# Patient Record
Sex: FEMALE | Race: BLACK OR AFRICAN AMERICAN | Employment: OTHER
[De-identification: names, ages, dates, MRNs, and addresses within clinical notes are randomized per-mention and may not be internally consistent; named-entity substitution may affect disease eponyms.]

---

## 2017-01-03 DIAGNOSIS — Z13.31 POSITIVE DEPRESSION SCREENING: ICD-10-CM

## 2017-01-04 RX ORDER — CITALOPRAM 20 MG/1
TABLET ORAL
Qty: 30 TABLET | Refills: 3 | Status: SHIPPED | OUTPATIENT
Start: 2017-01-04 | End: 2017-05-08 | Stop reason: SDUPTHER

## 2017-01-20 ENCOUNTER — CARE COORDINATION (OUTPATIENT)
Dept: FAMILY MEDICINE CLINIC | Facility: CLINIC | Age: 62
End: 2017-01-20

## 2017-01-26 ENCOUNTER — OFFICE VISIT (OUTPATIENT)
Dept: INTERNAL MEDICINE | Facility: CLINIC | Age: 62
End: 2017-01-26

## 2017-01-26 VITALS
OXYGEN SATURATION: 96 % | DIASTOLIC BLOOD PRESSURE: 92 MMHG | WEIGHT: 280 LBS | SYSTOLIC BLOOD PRESSURE: 156 MMHG | HEIGHT: 70 IN | BODY MASS INDEX: 40.09 KG/M2 | HEART RATE: 78 BPM

## 2017-01-26 DIAGNOSIS — E11.9 TYPE 2 DIABETES MELLITUS WITHOUT COMPLICATION, WITH LONG-TERM CURRENT USE OF INSULIN (HCC): Primary | ICD-10-CM

## 2017-01-26 DIAGNOSIS — E55.9 VITAMIN D DEFICIENCY DISEASE: ICD-10-CM

## 2017-01-26 DIAGNOSIS — E66.9 OBESITY (BMI 30-39.9): ICD-10-CM

## 2017-01-26 DIAGNOSIS — I10 ESSENTIAL HYPERTENSION: ICD-10-CM

## 2017-01-26 DIAGNOSIS — Z79.4 TYPE 2 DIABETES MELLITUS WITHOUT COMPLICATION, WITH LONG-TERM CURRENT USE OF INSULIN (HCC): Primary | ICD-10-CM

## 2017-01-26 DIAGNOSIS — F32.89 OTHER DEPRESSION: ICD-10-CM

## 2017-01-26 DIAGNOSIS — Z23 NEED FOR PNEUMOCOCCAL VACCINATION: ICD-10-CM

## 2017-01-26 PROCEDURE — 99214 OFFICE O/P EST MOD 30 MIN: CPT | Performed by: INTERNAL MEDICINE

## 2017-01-26 PROCEDURE — 90471 IMMUNIZATION ADMIN: CPT | Performed by: INTERNAL MEDICINE

## 2017-01-26 PROCEDURE — 90732 PPSV23 VACC 2 YRS+ SUBQ/IM: CPT | Performed by: INTERNAL MEDICINE

## 2017-01-26 RX ORDER — HYDROCHLOROTHIAZIDE 50 MG/1
TABLET ORAL
Qty: 30 TABLET | Refills: 5 | Status: SHIPPED | OUTPATIENT
Start: 2017-01-26 | End: 2017-04-27 | Stop reason: SDUPTHER

## 2017-01-26 RX ORDER — METOPROLOL TARTRATE 75 MG/1
75 TABLET, FILM COATED ORAL 2 TIMES DAILY
Qty: 60 TABLET | Refills: 5 | Status: SHIPPED | OUTPATIENT
Start: 2017-01-26 | End: 2017-04-27 | Stop reason: SDUPTHER

## 2017-01-26 RX ORDER — HYDRALAZINE HYDROCHLORIDE 50 MG/1
50 TABLET, FILM COATED ORAL 4 TIMES DAILY
Qty: 120 TABLET | Refills: 5 | Status: SHIPPED | OUTPATIENT
Start: 2017-01-26 | End: 2017-04-27 | Stop reason: SDUPTHER

## 2017-01-26 ASSESSMENT — PATIENT HEALTH QUESTIONNAIRE - PHQ9
SUM OF ALL RESPONSES TO PHQ QUESTIONS 1-9: 0
2. FEELING DOWN, DEPRESSED OR HOPELESS: 0
1. LITTLE INTEREST OR PLEASURE IN DOING THINGS: 0
SUM OF ALL RESPONSES TO PHQ9 QUESTIONS 1 & 2: 0

## 2017-01-31 ENCOUNTER — CARE COORDINATION (OUTPATIENT)
Dept: INTERNAL MEDICINE | Facility: CLINIC | Age: 62
End: 2017-01-31

## 2017-01-31 VITALS — HEART RATE: 82 BPM | DIASTOLIC BLOOD PRESSURE: 92 MMHG | SYSTOLIC BLOOD PRESSURE: 150 MMHG | RESPIRATION RATE: 18 BRPM

## 2017-02-02 DIAGNOSIS — E11.9 TYPE 2 DIABETES MELLITUS WITHOUT COMPLICATION, WITH LONG-TERM CURRENT USE OF INSULIN (HCC): ICD-10-CM

## 2017-02-02 DIAGNOSIS — Z79.4 TYPE 2 DIABETES MELLITUS WITHOUT COMPLICATION, WITH LONG-TERM CURRENT USE OF INSULIN (HCC): ICD-10-CM

## 2017-02-13 DIAGNOSIS — E78.5 HYPERLIPIDEMIA WITH TARGET LDL LESS THAN 100: ICD-10-CM

## 2017-02-13 DIAGNOSIS — E11.9 TYPE 2 DIABETES MELLITUS WITHOUT COMPLICATION (HCC): ICD-10-CM

## 2017-02-17 RX ORDER — ASPIRIN 81 MG
TABLET,CHEWABLE ORAL
Qty: 30 TABLET | Refills: 0 | OUTPATIENT
Start: 2017-02-17

## 2017-02-17 RX ORDER — ATORVASTATIN CALCIUM 40 MG/1
TABLET, FILM COATED ORAL
Qty: 30 TABLET | Refills: 0 | OUTPATIENT
Start: 2017-02-17

## 2017-02-28 DIAGNOSIS — E11.9 TYPE 2 DIABETES MELLITUS WITHOUT COMPLICATION (HCC): ICD-10-CM

## 2017-02-28 DIAGNOSIS — E78.5 HYPERLIPIDEMIA WITH TARGET LDL LESS THAN 100: ICD-10-CM

## 2017-03-01 DIAGNOSIS — I10 ESSENTIAL HYPERTENSION: ICD-10-CM

## 2017-03-01 DIAGNOSIS — E11.9 TYPE 2 DIABETES MELLITUS WITHOUT COMPLICATION, WITHOUT LONG-TERM CURRENT USE OF INSULIN (HCC): ICD-10-CM

## 2017-03-06 ENCOUNTER — CARE COORDINATOR VISIT (OUTPATIENT)
Dept: FAMILY MEDICINE CLINIC | Facility: CLINIC | Age: 62
End: 2017-03-06

## 2017-03-06 RX ORDER — ATORVASTATIN CALCIUM 40 MG/1
TABLET, FILM COATED ORAL
Qty: 30 TABLET | Refills: 3 | Status: SHIPPED | OUTPATIENT
Start: 2017-03-06 | End: 2017-06-19 | Stop reason: SDUPTHER

## 2017-03-06 RX ORDER — GLIPIZIDE 5 MG/1
TABLET ORAL
Qty: 60 TABLET | Refills: 3 | Status: SHIPPED | OUTPATIENT
Start: 2017-03-06 | End: 2017-06-21

## 2017-03-06 RX ORDER — LOSARTAN POTASSIUM 100 MG/1
TABLET ORAL
Qty: 28 TABLET | Refills: 3 | Status: SHIPPED | OUTPATIENT
Start: 2017-03-06 | End: 2017-04-27 | Stop reason: SDUPTHER

## 2017-03-06 RX ORDER — ASPIRIN 81 MG
TABLET,CHEWABLE ORAL
Qty: 30 TABLET | Refills: 3 | Status: SHIPPED | OUTPATIENT
Start: 2017-03-06 | End: 2017-06-19 | Stop reason: SDUPTHER

## 2017-03-07 VITALS
RESPIRATION RATE: 20 BRPM | HEART RATE: 76 BPM | DIASTOLIC BLOOD PRESSURE: 84 MMHG | OXYGEN SATURATION: 97 % | SYSTOLIC BLOOD PRESSURE: 148 MMHG

## 2017-03-19 ENCOUNTER — HOSPITAL ENCOUNTER (OUTPATIENT)
Age: 62
Setting detail: OBSERVATION
Discharge: HOME | End: 2017-03-20
Attending: EMERGENCY MEDICINE | Admitting: EMERGENCY MEDICINE
Payer: MEDICAID

## 2017-03-19 ENCOUNTER — APPOINTMENT (OUTPATIENT)
Dept: GENERAL RADIOLOGY | Age: 62
End: 2017-03-19
Payer: MEDICAID

## 2017-03-19 DIAGNOSIS — R07.9 CHEST PAIN, UNSPECIFIED TYPE: Primary | ICD-10-CM

## 2017-03-19 LAB
ANION GAP SERPL CALCULATED.3IONS-SCNC: 12 MMOL/L (ref 9–17)
BUN BLDV-MCNC: 13 MG/DL (ref 6–20)
BUN/CREAT BLD: ABNORMAL (ref 9–20)
CALCIUM SERPL-MCNC: 9.4 MG/DL (ref 8.6–10.4)
CHLORIDE BLD-SCNC: 102 MMOL/L (ref 98–107)
CO2: 25 MMOL/L (ref 20–31)
CREAT SERPL-MCNC: 0.6 MG/DL (ref 0.5–0.9)
GFR AFRICAN AMERICAN: >60 ML/MIN
GFR NON-AFRICAN AMERICAN: >60 ML/MIN
GFR SERPL CREATININE-BSD FRML MDRD: ABNORMAL ML/MIN/{1.73_M2}
GFR SERPL CREATININE-BSD FRML MDRD: ABNORMAL ML/MIN/{1.73_M2}
GLUCOSE BLD-MCNC: 137 MG/DL (ref 70–99)
HCT VFR BLD CALC: 38.2 % (ref 36–46)
HEMOGLOBIN: 12.9 G/DL (ref 12–16)
MCH RBC QN AUTO: 26.9 PG (ref 26–34)
MCHC RBC AUTO-ENTMCNC: 33.7 G/DL (ref 31–37)
MCV RBC AUTO: 79.9 FL (ref 80–100)
PDW BLD-RTO: 18.8 % (ref 12.5–15.4)
PLATELET # BLD: 261 K/UL (ref 140–450)
PMV BLD AUTO: 9.9 FL (ref 6–12)
POC TROPONIN I: 0 NG/ML (ref 0–0.1)
POC TROPONIN INTERP: NORMAL
POTASSIUM SERPL-SCNC: 4.2 MMOL/L (ref 3.7–5.3)
RBC # BLD: 4.78 M/UL (ref 4–5.2)
SODIUM BLD-SCNC: 139 MMOL/L (ref 135–144)
WBC # BLD: 7.9 K/UL (ref 3.5–11)

## 2017-03-19 PROCEDURE — 85027 COMPLETE CBC AUTOMATED: CPT

## 2017-03-19 PROCEDURE — G0378 HOSPITAL OBSERVATION PER HR: HCPCS

## 2017-03-19 PROCEDURE — 71020 XR CHEST STANDARD TWO VW: CPT

## 2017-03-19 PROCEDURE — 99285 EMERGENCY DEPT VISIT HI MDM: CPT

## 2017-03-19 PROCEDURE — 80048 BASIC METABOLIC PNL TOTAL CA: CPT

## 2017-03-19 PROCEDURE — 2500000003 HC RX 250 WO HCPCS: Performed by: EMERGENCY MEDICINE

## 2017-03-19 PROCEDURE — 93005 ELECTROCARDIOGRAM TRACING: CPT

## 2017-03-19 PROCEDURE — 84484 ASSAY OF TROPONIN QUANT: CPT

## 2017-03-19 PROCEDURE — 96374 THER/PROPH/DIAG INJ IV PUSH: CPT

## 2017-03-19 RX ORDER — CITALOPRAM 20 MG/1
20 TABLET ORAL DAILY
Status: DISCONTINUED | OUTPATIENT
Start: 2017-03-20 | End: 2017-03-20 | Stop reason: HOSPADM

## 2017-03-19 RX ORDER — ACETAMINOPHEN 325 MG/1
650 TABLET ORAL EVERY 4 HOURS PRN
Status: DISCONTINUED | OUTPATIENT
Start: 2017-03-19 | End: 2017-03-20 | Stop reason: HOSPADM

## 2017-03-19 RX ORDER — LOSARTAN POTASSIUM 50 MG/1
100 TABLET ORAL DAILY
Status: DISCONTINUED | OUTPATIENT
Start: 2017-03-20 | End: 2017-03-20 | Stop reason: HOSPADM

## 2017-03-19 RX ORDER — LABETALOL HYDROCHLORIDE 5 MG/ML
10 INJECTION, SOLUTION INTRAVENOUS ONCE
Status: COMPLETED | OUTPATIENT
Start: 2017-03-19 | End: 2017-03-19

## 2017-03-19 RX ORDER — ATORVASTATIN CALCIUM 40 MG/1
40 TABLET, FILM COATED ORAL NIGHTLY
Status: DISCONTINUED | OUTPATIENT
Start: 2017-03-20 | End: 2017-03-20 | Stop reason: HOSPADM

## 2017-03-19 RX ORDER — GLIPIZIDE 5 MG/1
5 TABLET ORAL
Status: DISCONTINUED | OUTPATIENT
Start: 2017-03-20 | End: 2017-03-20 | Stop reason: HOSPADM

## 2017-03-19 RX ORDER — M-VIT,TX,IRON,MINS/CALC/FOLIC 27MG-0.4MG
1 TABLET ORAL DAILY
Status: DISCONTINUED | OUTPATIENT
Start: 2017-03-20 | End: 2017-03-20 | Stop reason: HOSPADM

## 2017-03-19 RX ORDER — HYDROCHLOROTHIAZIDE 50 MG/1
50 TABLET ORAL DAILY
Status: DISCONTINUED | OUTPATIENT
Start: 2017-03-20 | End: 2017-03-20 | Stop reason: HOSPADM

## 2017-03-19 RX ORDER — METOPROLOL TARTRATE 50 MG/1
75 TABLET, FILM COATED ORAL 2 TIMES DAILY
Status: DISCONTINUED | OUTPATIENT
Start: 2017-03-20 | End: 2017-03-20 | Stop reason: HOSPADM

## 2017-03-19 RX ORDER — SODIUM CHLORIDE 0.9 % (FLUSH) 0.9 %
10 SYRINGE (ML) INJECTION EVERY 12 HOURS SCHEDULED
Status: DISCONTINUED | OUTPATIENT
Start: 2017-03-20 | End: 2017-03-20 | Stop reason: HOSPADM

## 2017-03-19 RX ORDER — HYDRALAZINE HYDROCHLORIDE 50 MG/1
50 TABLET, FILM COATED ORAL 4 TIMES DAILY
Status: DISCONTINUED | OUTPATIENT
Start: 2017-03-20 | End: 2017-03-20 | Stop reason: HOSPADM

## 2017-03-19 RX ORDER — ASPIRIN 81 MG/1
81 TABLET, CHEWABLE ORAL DAILY
Status: DISCONTINUED | OUTPATIENT
Start: 2017-03-20 | End: 2017-03-20 | Stop reason: HOSPADM

## 2017-03-19 RX ORDER — SODIUM CHLORIDE 0.9 % (FLUSH) 0.9 %
10 SYRINGE (ML) INJECTION PRN
Status: DISCONTINUED | OUTPATIENT
Start: 2017-03-19 | End: 2017-03-20 | Stop reason: HOSPADM

## 2017-03-19 RX ORDER — ALBUTEROL SULFATE 90 UG/1
2 AEROSOL, METERED RESPIRATORY (INHALATION) EVERY 6 HOURS PRN
Status: DISCONTINUED | OUTPATIENT
Start: 2017-03-19 | End: 2017-03-20 | Stop reason: HOSPADM

## 2017-03-19 RX ADMIN — LABETALOL HYDROCHLORIDE 10 MG: 5 INJECTION, SOLUTION INTRAVENOUS at 21:40

## 2017-03-19 ASSESSMENT — HEART SCORE
ECG: 0
ECG: 0

## 2017-03-19 ASSESSMENT — PAIN DESCRIPTION - ORIENTATION: ORIENTATION: MID

## 2017-03-19 ASSESSMENT — PAIN DESCRIPTION - PAIN TYPE: TYPE: ACUTE PAIN

## 2017-03-19 ASSESSMENT — PAIN DESCRIPTION - DESCRIPTORS: DESCRIPTORS: TIGHTNESS

## 2017-03-19 ASSESSMENT — PAIN DESCRIPTION - LOCATION: LOCATION: CHEST

## 2017-03-19 ASSESSMENT — PAIN SCALES - GENERAL: PAINLEVEL_OUTOF10: 4

## 2017-03-19 ASSESSMENT — PAIN DESCRIPTION - FREQUENCY: FREQUENCY: CONTINUOUS

## 2017-03-20 VITALS
RESPIRATION RATE: 18 BRPM | DIASTOLIC BLOOD PRESSURE: 89 MMHG | SYSTOLIC BLOOD PRESSURE: 129 MMHG | TEMPERATURE: 98.2 F | BODY MASS INDEX: 40.09 KG/M2 | HEIGHT: 70 IN | HEART RATE: 76 BPM | OXYGEN SATURATION: 95 % | WEIGHT: 280 LBS

## 2017-03-20 LAB
EKG ATRIAL RATE: 69 BPM
EKG ATRIAL RATE: 79 BPM
EKG ATRIAL RATE: 89 BPM
EKG P AXIS: 0 DEGREES
EKG P AXIS: 5 DEGREES
EKG P AXIS: 53 DEGREES
EKG P-R INTERVAL: 162 MS
EKG P-R INTERVAL: 164 MS
EKG P-R INTERVAL: 168 MS
EKG Q-T INTERVAL: 380 MS
EKG Q-T INTERVAL: 406 MS
EKG Q-T INTERVAL: 442 MS
EKG QRS DURATION: 84 MS
EKG QRS DURATION: 90 MS
EKG QRS DURATION: 94 MS
EKG QTC CALCULATION (BAZETT): 462 MS
EKG QTC CALCULATION (BAZETT): 465 MS
EKG QTC CALCULATION (BAZETT): 473 MS
EKG R AXIS: -16 DEGREES
EKG R AXIS: -18 DEGREES
EKG R AXIS: -18 DEGREES
EKG T AXIS: 11 DEGREES
EKG T AXIS: 39 DEGREES
EKG T AXIS: 9 DEGREES
EKG VENTRICULAR RATE: 69 BPM
EKG VENTRICULAR RATE: 79 BPM
EKG VENTRICULAR RATE: 89 BPM
GLUCOSE BLD-MCNC: 134 MG/DL (ref 65–105)
TROPONIN INTERP: NORMAL
TROPONIN INTERP: NORMAL
TROPONIN T: <0.03 NG/ML
TROPONIN T: <0.03 NG/ML

## 2017-03-20 PROCEDURE — G0378 HOSPITAL OBSERVATION PER HR: HCPCS

## 2017-03-20 PROCEDURE — 36415 COLL VENOUS BLD VENIPUNCTURE: CPT

## 2017-03-20 PROCEDURE — 93005 ELECTROCARDIOGRAM TRACING: CPT

## 2017-03-20 PROCEDURE — 6360000002 HC RX W HCPCS: Performed by: EMERGENCY MEDICINE

## 2017-03-20 PROCEDURE — 6370000000 HC RX 637 (ALT 250 FOR IP): Performed by: EMERGENCY MEDICINE

## 2017-03-20 PROCEDURE — 2580000003 HC RX 258: Performed by: EMERGENCY MEDICINE

## 2017-03-20 PROCEDURE — 96372 THER/PROPH/DIAG INJ SC/IM: CPT

## 2017-03-20 PROCEDURE — 84484 ASSAY OF TROPONIN QUANT: CPT

## 2017-03-20 PROCEDURE — 82947 ASSAY GLUCOSE BLOOD QUANT: CPT

## 2017-03-20 RX ADMIN — HYDRALAZINE HYDROCHLORIDE 50 MG: 50 TABLET, FILM COATED ORAL at 09:58

## 2017-03-20 RX ADMIN — ENOXAPARIN SODIUM 40 MG: 40 INJECTION SUBCUTANEOUS at 09:10

## 2017-03-20 RX ADMIN — METOPROLOL TARTRATE 75 MG: 50 TABLET, FILM COATED ORAL at 09:57

## 2017-03-20 RX ADMIN — HYDROCHLOROTHIAZIDE 50 MG: 50 TABLET ORAL at 09:57

## 2017-03-20 RX ADMIN — ASPIRIN 81 MG: 81 TABLET, CHEWABLE ORAL at 09:58

## 2017-03-20 RX ADMIN — LOSARTAN POTASSIUM 100 MG: 50 TABLET, FILM COATED ORAL at 12:58

## 2017-03-20 RX ADMIN — CITALOPRAM 20 MG: 20 TABLET, FILM COATED ORAL at 09:58

## 2017-03-20 RX ADMIN — MULTIPLE VITAMINS W/ MINERALS TAB 1 TABLET: TAB at 09:57

## 2017-03-20 RX ADMIN — Medication 10 ML: at 09:58

## 2017-03-20 RX ADMIN — GLIPIZIDE 5 MG: 5 TABLET ORAL at 12:58

## 2017-03-20 ASSESSMENT — ENCOUNTER SYMPTOMS
SHORTNESS OF BREATH: 0
WHEEZING: 0
ABDOMINAL PAIN: 0
RHINORRHEA: 0
COUGH: 0
VOMITING: 0
CONSTIPATION: 0
TROUBLE SWALLOWING: 0
SORE THROAT: 0
BLOOD IN STOOL: 0
DIARRHEA: 0
NAUSEA: 1

## 2017-03-20 ASSESSMENT — PAIN SCALES - GENERAL: PAINLEVEL_OUTOF10: 0

## 2017-03-28 ENCOUNTER — HOSPITAL ENCOUNTER (OUTPATIENT)
Dept: MAMMOGRAPHY | Age: 62
Discharge: HOME | End: 2017-03-28
Payer: MEDICAID

## 2017-03-28 DIAGNOSIS — Z12.31 ENCOUNTER FOR SCREENING MAMMOGRAM FOR MALIGNANT NEOPLASM OF BREAST: ICD-10-CM

## 2017-03-28 PROCEDURE — G0202 SCR MAMMO BI INCL CAD: HCPCS

## 2017-04-11 DIAGNOSIS — E55.9 VITAMIN D DEFICIENCY DISEASE: ICD-10-CM

## 2017-04-11 DIAGNOSIS — M17.0 OSTEOARTHRITIS OF BOTH KNEES, UNSPECIFIED OSTEOARTHRITIS TYPE: ICD-10-CM

## 2017-04-13 RX ORDER — ERGOCALCIFEROL 1.25 MG/1
CAPSULE ORAL
Qty: 2 CAPSULE | Refills: 0 | Status: SHIPPED | OUTPATIENT
Start: 2017-04-13 | End: 2017-05-08 | Stop reason: SDUPTHER

## 2017-04-27 ENCOUNTER — HOSPITAL ENCOUNTER (OUTPATIENT)
Age: 62
Setting detail: SPECIMEN
Discharge: HOME | End: 2017-04-27
Payer: MEDICAID

## 2017-04-27 ENCOUNTER — OFFICE VISIT (OUTPATIENT)
Dept: INTERNAL MEDICINE | Age: 62
End: 2017-04-27
Payer: MEDICAID

## 2017-04-27 VITALS
HEIGHT: 64 IN | BODY MASS INDEX: 46.1 KG/M2 | OXYGEN SATURATION: 96 % | SYSTOLIC BLOOD PRESSURE: 144 MMHG | HEART RATE: 78 BPM | DIASTOLIC BLOOD PRESSURE: 66 MMHG | RESPIRATION RATE: 12 BRPM | WEIGHT: 270 LBS

## 2017-04-27 DIAGNOSIS — Z79.4 TYPE 2 DIABETES MELLITUS WITHOUT COMPLICATION, WITH LONG-TERM CURRENT USE OF INSULIN (HCC): Primary | ICD-10-CM

## 2017-04-27 DIAGNOSIS — J45.909 UNCOMPLICATED ASTHMA, UNSPECIFIED ASTHMA SEVERITY: ICD-10-CM

## 2017-04-27 DIAGNOSIS — E11.9 TYPE 2 DIABETES MELLITUS WITHOUT COMPLICATION, WITH LONG-TERM CURRENT USE OF INSULIN (HCC): Primary | ICD-10-CM

## 2017-04-27 DIAGNOSIS — I10 ESSENTIAL HYPERTENSION: ICD-10-CM

## 2017-04-27 LAB — HBA1C MFR BLD: 6.8 %

## 2017-04-27 PROCEDURE — 83036 HEMOGLOBIN GLYCOSYLATED A1C: CPT | Performed by: INTERNAL MEDICINE

## 2017-04-27 PROCEDURE — 99214 OFFICE O/P EST MOD 30 MIN: CPT | Performed by: INTERNAL MEDICINE

## 2017-04-27 RX ORDER — METOPROLOL TARTRATE 75 MG/1
75 TABLET, FILM COATED ORAL 2 TIMES DAILY
Qty: 60 TABLET | Refills: 5 | Status: SHIPPED | OUTPATIENT
Start: 2017-04-27 | End: 2018-01-08

## 2017-04-27 RX ORDER — HYDROCHLOROTHIAZIDE 50 MG/1
TABLET ORAL
Qty: 30 TABLET | Refills: 5 | Status: SHIPPED | OUTPATIENT
Start: 2017-04-27 | End: 2018-02-05 | Stop reason: SDUPTHER

## 2017-04-27 RX ORDER — HYDRALAZINE HYDROCHLORIDE 25 MG/1
75 TABLET, FILM COATED ORAL 4 TIMES DAILY
Qty: 120 TABLET | Refills: 0 | Status: SHIPPED | OUTPATIENT
Start: 2017-04-27 | End: 2017-06-05 | Stop reason: SDUPTHER

## 2017-04-27 RX ORDER — LOSARTAN POTASSIUM 100 MG/1
TABLET ORAL
Qty: 28 TABLET | Refills: 3 | Status: SHIPPED | OUTPATIENT
Start: 2017-04-27 | End: 2017-11-06 | Stop reason: SDUPTHER

## 2017-04-27 RX ORDER — ALBUTEROL SULFATE 90 UG/1
2 AEROSOL, METERED RESPIRATORY (INHALATION) EVERY 6 HOURS PRN
Qty: 1 INHALER | Refills: 3 | Status: SHIPPED | OUTPATIENT
Start: 2017-04-27 | End: 2017-12-27 | Stop reason: SDUPTHER

## 2017-04-28 LAB
CREATININE URINE: 97 MG/DL (ref 28–217)
MICROALBUMIN/CREAT 24H UR: <12 MG/L
MICROALBUMIN/CREAT UR-RTO: 12 MCG/MG CREAT

## 2017-05-01 ENCOUNTER — HOSPITAL ENCOUNTER (OUTPATIENT)
Age: 62
Setting detail: SPECIMEN
Discharge: HOME | End: 2017-05-01
Payer: MEDICAID

## 2017-05-01 ENCOUNTER — OFFICE VISIT (OUTPATIENT)
Dept: INTERNAL MEDICINE | Age: 62
End: 2017-05-01
Payer: MEDICAID

## 2017-05-01 VITALS
OXYGEN SATURATION: 97 % | WEIGHT: 271 LBS | DIASTOLIC BLOOD PRESSURE: 76 MMHG | HEIGHT: 64 IN | BODY MASS INDEX: 46.26 KG/M2 | SYSTOLIC BLOOD PRESSURE: 132 MMHG | HEART RATE: 84 BPM

## 2017-05-01 DIAGNOSIS — K21.9 GASTROESOPHAGEAL REFLUX DISEASE WITHOUT ESOPHAGITIS: Primary | ICD-10-CM

## 2017-05-01 DIAGNOSIS — E78.5 HYPERLIPIDEMIA WITH TARGET LDL LESS THAN 100: ICD-10-CM

## 2017-05-01 DIAGNOSIS — Z79.4 TYPE 2 DIABETES MELLITUS WITHOUT COMPLICATION, WITH LONG-TERM CURRENT USE OF INSULIN (HCC): ICD-10-CM

## 2017-05-01 DIAGNOSIS — I10 ESSENTIAL HYPERTENSION: ICD-10-CM

## 2017-05-01 DIAGNOSIS — E11.9 TYPE 2 DIABETES MELLITUS WITHOUT COMPLICATION, WITH LONG-TERM CURRENT USE OF INSULIN (HCC): ICD-10-CM

## 2017-05-01 LAB
CHOLESTEROL/HDL RATIO: 2.2
CHOLESTEROL: 116 MG/DL
HDLC SERPL-MCNC: 53 MG/DL
LDL CHOLESTEROL: 52 MG/DL (ref 0–130)
TRIGL SERPL-MCNC: 54 MG/DL
VLDLC SERPL CALC-MCNC: NORMAL MG/DL (ref 1–30)

## 2017-05-01 PROCEDURE — 36415 COLL VENOUS BLD VENIPUNCTURE: CPT

## 2017-05-01 PROCEDURE — 80061 LIPID PANEL: CPT

## 2017-05-01 PROCEDURE — 99214 OFFICE O/P EST MOD 30 MIN: CPT | Performed by: INTERNAL MEDICINE

## 2017-05-01 RX ORDER — OMEPRAZOLE 20 MG/1
20 CAPSULE, DELAYED RELEASE ORAL 2 TIMES DAILY
Qty: 30 CAPSULE | Refills: 3 | Status: SHIPPED | OUTPATIENT
Start: 2017-05-01 | End: 2017-06-19 | Stop reason: SDUPTHER

## 2017-05-08 DIAGNOSIS — Z13.31 POSITIVE DEPRESSION SCREENING: ICD-10-CM

## 2017-05-08 DIAGNOSIS — M17.0 OSTEOARTHRITIS OF BOTH KNEES, UNSPECIFIED OSTEOARTHRITIS TYPE: ICD-10-CM

## 2017-05-08 DIAGNOSIS — E55.9 VITAMIN D DEFICIENCY DISEASE: ICD-10-CM

## 2017-05-08 RX ORDER — ERGOCALCIFEROL 1.25 MG/1
CAPSULE ORAL
Qty: 2 CAPSULE | Refills: 0 | Status: SHIPPED | OUTPATIENT
Start: 2017-05-08 | End: 2017-05-23 | Stop reason: SDUPTHER

## 2017-05-08 RX ORDER — CITALOPRAM 20 MG/1
TABLET ORAL
Qty: 30 TABLET | Refills: 0 | Status: SHIPPED | OUTPATIENT
Start: 2017-05-08 | End: 2017-05-23 | Stop reason: SDUPTHER

## 2017-05-23 DIAGNOSIS — Z13.31 POSITIVE DEPRESSION SCREENING: ICD-10-CM

## 2017-05-23 DIAGNOSIS — E55.9 VITAMIN D DEFICIENCY DISEASE: ICD-10-CM

## 2017-05-23 DIAGNOSIS — M17.0 OSTEOARTHRITIS OF BOTH KNEES, UNSPECIFIED OSTEOARTHRITIS TYPE: ICD-10-CM

## 2017-05-23 RX ORDER — CITALOPRAM 20 MG/1
TABLET ORAL
Qty: 30 TABLET | Refills: 3 | Status: SHIPPED | OUTPATIENT
Start: 2017-05-23 | End: 2017-11-06 | Stop reason: SDUPTHER

## 2017-05-23 RX ORDER — ERGOCALCIFEROL 1.25 MG/1
CAPSULE ORAL
Qty: 4 CAPSULE | Refills: 3 | Status: SHIPPED | OUTPATIENT
Start: 2017-05-23 | End: 2017-08-16 | Stop reason: SDUPTHER

## 2017-06-05 DIAGNOSIS — I10 ESSENTIAL HYPERTENSION: ICD-10-CM

## 2017-06-05 RX ORDER — HYDRALAZINE HYDROCHLORIDE 25 MG/1
TABLET, FILM COATED ORAL
Qty: 120 TABLET | Refills: 0 | Status: SHIPPED | OUTPATIENT
Start: 2017-06-05 | End: 2017-07-21 | Stop reason: SDUPTHER

## 2017-06-19 DIAGNOSIS — K21.9 GASTROESOPHAGEAL REFLUX DISEASE WITHOUT ESOPHAGITIS: ICD-10-CM

## 2017-06-19 DIAGNOSIS — E11.9 TYPE 2 DIABETES MELLITUS WITHOUT COMPLICATION (HCC): ICD-10-CM

## 2017-06-19 DIAGNOSIS — E78.5 HYPERLIPIDEMIA WITH TARGET LDL LESS THAN 100: ICD-10-CM

## 2017-06-21 RX ORDER — OMEPRAZOLE 20 MG/1
CAPSULE, DELAYED RELEASE ORAL
Qty: 60 CAPSULE | Refills: 0 | Status: SHIPPED | OUTPATIENT
Start: 2017-06-21 | End: 2017-07-21 | Stop reason: SDUPTHER

## 2017-06-21 RX ORDER — GLIPIZIDE 5 MG/1
TABLET ORAL
Qty: 60 TABLET | Refills: 0 | Status: SHIPPED | OUTPATIENT
Start: 2017-06-21 | End: 2017-07-24

## 2017-06-21 RX ORDER — ATORVASTATIN CALCIUM 40 MG/1
TABLET, FILM COATED ORAL
Qty: 30 TABLET | Refills: 0 | Status: SHIPPED | OUTPATIENT
Start: 2017-06-21 | End: 2017-07-21 | Stop reason: SDUPTHER

## 2017-06-21 RX ORDER — ASPIRIN 81 MG
TABLET,CHEWABLE ORAL
Qty: 14 TABLET | Refills: 0 | Status: SHIPPED | OUTPATIENT
Start: 2017-06-21 | End: 2017-07-21 | Stop reason: SDUPTHER

## 2017-07-17 ENCOUNTER — HOSPITAL ENCOUNTER (EMERGENCY)
Age: 62
Discharge: HOME | End: 2017-07-17
Attending: EMERGENCY MEDICINE
Payer: MEDICAID

## 2017-07-17 VITALS
RESPIRATION RATE: 16 BRPM | DIASTOLIC BLOOD PRESSURE: 89 MMHG | HEART RATE: 63 BPM | TEMPERATURE: 98.1 F | OXYGEN SATURATION: 100 % | SYSTOLIC BLOOD PRESSURE: 135 MMHG

## 2017-07-17 DIAGNOSIS — B37.2 CANDIDA INFECTION OF FLEXURAL SKIN: Primary | ICD-10-CM

## 2017-07-17 PROCEDURE — 6370000000 HC RX 637 (ALT 250 FOR IP): Performed by: PHYSICIAN ASSISTANT

## 2017-07-17 PROCEDURE — 99282 EMERGENCY DEPT VISIT SF MDM: CPT

## 2017-07-17 RX ORDER — CLOTRIMAZOLE AND BETAMETHASONE DIPROPIONATE 10; .64 MG/G; MG/G
CREAM TOPICAL
Qty: 1 TUBE | Refills: 0 | Status: SHIPPED | OUTPATIENT
Start: 2017-07-17 | End: 2017-10-23 | Stop reason: SDUPTHER

## 2017-07-17 RX ORDER — CLOTRIMAZOLE AND BETAMETHASONE DIPROPIONATE 10; .64 MG/G; MG/G
CREAM TOPICAL 2 TIMES DAILY
Status: DISCONTINUED | OUTPATIENT
Start: 2017-07-17 | End: 2017-07-17 | Stop reason: HOSPADM

## 2017-07-17 RX ADMIN — CLOTRIMAZOLE AND BETAMETHASONE DIPROPIONATE: 10; .5 CREAM TOPICAL at 15:04

## 2017-07-17 ASSESSMENT — ENCOUNTER SYMPTOMS
WHEEZING: 0
ABDOMINAL PAIN: 0
VOMITING: 0
NAUSEA: 0
COLOR CHANGE: 0
COUGH: 0

## 2017-07-17 ASSESSMENT — PAIN DESCRIPTION - FREQUENCY: FREQUENCY: CONTINUOUS

## 2017-07-17 ASSESSMENT — PAIN DESCRIPTION - PAIN TYPE: TYPE: ACUTE PAIN

## 2017-07-17 ASSESSMENT — PAIN DESCRIPTION - ORIENTATION: ORIENTATION: RIGHT;POSTERIOR

## 2017-07-17 ASSESSMENT — PAIN DESCRIPTION - LOCATION: LOCATION: KNEE

## 2017-07-17 ASSESSMENT — PAIN DESCRIPTION - DESCRIPTORS: DESCRIPTORS: ITCHING;BURNING

## 2017-07-17 ASSESSMENT — PAIN SCALES - GENERAL: PAINLEVEL_OUTOF10: 7

## 2017-07-21 DIAGNOSIS — E78.5 HYPERLIPIDEMIA WITH TARGET LDL LESS THAN 100: ICD-10-CM

## 2017-07-21 DIAGNOSIS — E11.9 TYPE 2 DIABETES MELLITUS WITHOUT COMPLICATION (HCC): ICD-10-CM

## 2017-07-21 DIAGNOSIS — I10 ESSENTIAL HYPERTENSION: ICD-10-CM

## 2017-07-21 DIAGNOSIS — K21.9 GASTROESOPHAGEAL REFLUX DISEASE WITHOUT ESOPHAGITIS: ICD-10-CM

## 2017-07-24 RX ORDER — ASPIRIN 81 MG
TABLET,CHEWABLE ORAL
Qty: 30 TABLET | Refills: 3 | Status: SHIPPED | OUTPATIENT
Start: 2017-07-24 | End: 2017-12-08 | Stop reason: SDUPTHER

## 2017-07-24 RX ORDER — HYDRALAZINE HYDROCHLORIDE 25 MG/1
TABLET, FILM COATED ORAL
Qty: 120 TABLET | Refills: 3 | Status: SHIPPED | OUTPATIENT
Start: 2017-07-24 | End: 2017-11-06

## 2017-07-24 RX ORDER — ATORVASTATIN CALCIUM 40 MG/1
TABLET, FILM COATED ORAL
Qty: 30 TABLET | Refills: 3 | Status: SHIPPED | OUTPATIENT
Start: 2017-07-24 | End: 2017-12-08 | Stop reason: SDUPTHER

## 2017-07-24 RX ORDER — OMEPRAZOLE 20 MG/1
CAPSULE, DELAYED RELEASE ORAL
Qty: 60 CAPSULE | Refills: 3 | Status: SHIPPED | OUTPATIENT
Start: 2017-07-24 | End: 2017-12-08 | Stop reason: SDUPTHER

## 2017-07-24 RX ORDER — GLIPIZIDE 5 MG/1
TABLET ORAL
Qty: 60 TABLET | Refills: 3 | Status: SHIPPED | OUTPATIENT
Start: 2017-07-24 | End: 2017-12-11

## 2017-08-08 ENCOUNTER — HOSPITAL ENCOUNTER (EMERGENCY)
Age: 62
Discharge: HOME | End: 2017-08-08
Attending: EMERGENCY MEDICINE
Payer: MEDICAID

## 2017-08-08 VITALS
BODY MASS INDEX: 46.1 KG/M2 | SYSTOLIC BLOOD PRESSURE: 132 MMHG | RESPIRATION RATE: 19 BRPM | HEART RATE: 98 BPM | TEMPERATURE: 97.9 F | HEIGHT: 64 IN | WEIGHT: 270 LBS | OXYGEN SATURATION: 94 % | DIASTOLIC BLOOD PRESSURE: 85 MMHG

## 2017-08-08 DIAGNOSIS — H81.13 BENIGN PAROXYSMAL POSITIONAL VERTIGO, BILATERAL: Primary | ICD-10-CM

## 2017-08-08 PROCEDURE — 99283 EMERGENCY DEPT VISIT LOW MDM: CPT

## 2017-08-08 PROCEDURE — 6360000002 HC RX W HCPCS: Performed by: STUDENT IN AN ORGANIZED HEALTH CARE EDUCATION/TRAINING PROGRAM

## 2017-08-08 PROCEDURE — 96372 THER/PROPH/DIAG INJ SC/IM: CPT

## 2017-08-08 PROCEDURE — 6370000000 HC RX 637 (ALT 250 FOR IP): Performed by: STUDENT IN AN ORGANIZED HEALTH CARE EDUCATION/TRAINING PROGRAM

## 2017-08-08 RX ORDER — NYSTATIN 100000 [USP'U]/G
POWDER TOPICAL
Qty: 1 BOTTLE | Refills: 0 | Status: SHIPPED | OUTPATIENT
Start: 2017-08-08 | End: 2017-10-23 | Stop reason: SDUPTHER

## 2017-08-08 RX ORDER — MECLIZINE HCL 12.5 MG/1
25 TABLET ORAL ONCE
Status: COMPLETED | OUTPATIENT
Start: 2017-08-08 | End: 2017-08-08

## 2017-08-08 RX ORDER — MECLIZINE HCL 12.5 MG/1
12.5 TABLET ORAL 3 TIMES DAILY PRN
Qty: 20 TABLET | Refills: 0 | Status: SHIPPED | OUTPATIENT
Start: 2017-08-08 | End: 2018-04-23

## 2017-08-08 RX ORDER — KETOROLAC TROMETHAMINE 30 MG/ML
30 INJECTION, SOLUTION INTRAMUSCULAR; INTRAVENOUS ONCE
Status: COMPLETED | OUTPATIENT
Start: 2017-08-08 | End: 2017-08-08

## 2017-08-08 RX ADMIN — KETOROLAC TROMETHAMINE 30 MG: 30 INJECTION, SOLUTION INTRAMUSCULAR at 18:28

## 2017-08-08 RX ADMIN — MECLIZINE HCL 25 MG: 12.5 TABLET ORAL at 18:28

## 2017-08-08 ASSESSMENT — PAIN DESCRIPTION - PAIN TYPE: TYPE: ACUTE PAIN

## 2017-08-08 ASSESSMENT — ENCOUNTER SYMPTOMS
WHEEZING: 0
ABDOMINAL PAIN: 0
BACK PAIN: 0
VOMITING: 0
SHORTNESS OF BREATH: 0
NAUSEA: 0
CHEST TIGHTNESS: 0
TROUBLE SWALLOWING: 0
SORE THROAT: 0
COUGH: 0

## 2017-08-08 ASSESSMENT — PAIN SCALES - GENERAL
PAINLEVEL_OUTOF10: 6
PAINLEVEL_OUTOF10: 6

## 2017-08-08 ASSESSMENT — PAIN DESCRIPTION - DESCRIPTORS: DESCRIPTORS: HEADACHE;PRESSURE

## 2017-08-16 ENCOUNTER — OFFICE VISIT (OUTPATIENT)
Dept: INTERNAL MEDICINE | Age: 62
End: 2017-08-16
Payer: MEDICAID

## 2017-08-16 VITALS
SYSTOLIC BLOOD PRESSURE: 127 MMHG | WEIGHT: 279 LBS | HEIGHT: 64 IN | OXYGEN SATURATION: 99 % | HEART RATE: 72 BPM | DIASTOLIC BLOOD PRESSURE: 77 MMHG | BODY MASS INDEX: 47.63 KG/M2

## 2017-08-16 DIAGNOSIS — E55.9 VITAMIN D DEFICIENCY DISEASE: ICD-10-CM

## 2017-08-16 DIAGNOSIS — I10 ESSENTIAL HYPERTENSION: Primary | ICD-10-CM

## 2017-08-16 DIAGNOSIS — Z79.4 TYPE 2 DIABETES MELLITUS WITHOUT COMPLICATION, WITH LONG-TERM CURRENT USE OF INSULIN (HCC): ICD-10-CM

## 2017-08-16 DIAGNOSIS — E11.9 TYPE 2 DIABETES MELLITUS WITHOUT COMPLICATION, WITH LONG-TERM CURRENT USE OF INSULIN (HCC): ICD-10-CM

## 2017-08-16 LAB — HBA1C MFR BLD: 7 %

## 2017-08-16 PROCEDURE — 99214 OFFICE O/P EST MOD 30 MIN: CPT | Performed by: FAMILY MEDICINE

## 2017-08-16 PROCEDURE — 83036 HEMOGLOBIN GLYCOSYLATED A1C: CPT | Performed by: FAMILY MEDICINE

## 2017-08-16 RX ORDER — ERGOCALCIFEROL 1.25 MG/1
50000 CAPSULE ORAL WEEKLY
Qty: 4 CAPSULE | Refills: 5 | Status: SHIPPED | OUTPATIENT
Start: 2017-08-16 | End: 2018-03-14 | Stop reason: SDUPTHER

## 2017-08-16 ASSESSMENT — ENCOUNTER SYMPTOMS
ABDOMINAL PAIN: 0
VOMITING: 0
COUGH: 0
SHORTNESS OF BREATH: 0
NAUSEA: 0

## 2017-09-18 ENCOUNTER — APPOINTMENT (OUTPATIENT)
Dept: GENERAL RADIOLOGY | Age: 62
End: 2017-09-18
Payer: MEDICAID

## 2017-09-18 ENCOUNTER — HOSPITAL ENCOUNTER (OUTPATIENT)
Age: 62
Setting detail: OBSERVATION
Discharge: HOME | End: 2017-09-19
Attending: EMERGENCY MEDICINE | Admitting: EMERGENCY MEDICINE
Payer: MEDICAID

## 2017-09-18 DIAGNOSIS — R42 POSTURAL DIZZINESS WITH PRESYNCOPE: Primary | ICD-10-CM

## 2017-09-18 DIAGNOSIS — R55 POSTURAL DIZZINESS WITH PRESYNCOPE: Primary | ICD-10-CM

## 2017-09-18 LAB
ABSOLUTE EOS #: 0.4 K/UL (ref 0–0.4)
ABSOLUTE LYMPH #: 2.1 K/UL (ref 1–4.8)
ABSOLUTE MONO #: 0.7 K/UL (ref 0.1–1.2)
ANION GAP SERPL CALCULATED.3IONS-SCNC: 12 MMOL/L (ref 9–17)
BASOPHILS # BLD: 1 %
BASOPHILS ABSOLUTE: 0.1 K/UL (ref 0–0.2)
BUN BLDV-MCNC: 14 MG/DL (ref 6–20)
BUN/CREAT BLD: ABNORMAL (ref 9–20)
CALCIUM SERPL-MCNC: 8.7 MG/DL (ref 8.6–10.4)
CHLORIDE BLD-SCNC: 104 MMOL/L (ref 98–107)
CO2: 24 MMOL/L (ref 20–31)
CREAT SERPL-MCNC: 0.56 MG/DL (ref 0.5–0.9)
DIFFERENTIAL TYPE: ABNORMAL
EOSINOPHILS RELATIVE PERCENT: 7 %
GFR AFRICAN AMERICAN: >60 ML/MIN
GFR NON-AFRICAN AMERICAN: >60 ML/MIN
GFR SERPL CREATININE-BSD FRML MDRD: ABNORMAL ML/MIN/{1.73_M2}
GFR SERPL CREATININE-BSD FRML MDRD: ABNORMAL ML/MIN/{1.73_M2}
GLUCOSE BLD-MCNC: 127 MG/DL (ref 65–105)
GLUCOSE BLD-MCNC: 160 MG/DL (ref 70–99)
GLUCOSE BLD-MCNC: 170 MG/DL (ref 65–105)
HCT VFR BLD CALC: 35.5 % (ref 36–46)
HEMOGLOBIN: 11.7 G/DL (ref 12–16)
LV EF: 58 %
LVEF MODALITY: NORMAL
LYMPHOCYTES # BLD: 34 %
MCH RBC QN AUTO: 26.7 PG (ref 26–34)
MCHC RBC AUTO-ENTMCNC: 33.1 G/DL (ref 31–37)
MCV RBC AUTO: 80.9 FL (ref 80–100)
MONOCYTES # BLD: 11 %
PDW BLD-RTO: 18.9 % (ref 12.5–15.4)
PLATELET # BLD: 231 K/UL (ref 140–450)
PLATELET ESTIMATE: ABNORMAL
PMV BLD AUTO: 10.2 FL (ref 6–12)
POC TROPONIN I: 0 NG/ML (ref 0–0.1)
POC TROPONIN INTERP: NORMAL
POTASSIUM SERPL-SCNC: 4 MMOL/L (ref 3.7–5.3)
RBC # BLD: 4.39 M/UL (ref 4–5.2)
RBC # BLD: ABNORMAL 10*6/UL
SEG NEUTROPHILS: 47 %
SEGMENTED NEUTROPHILS ABSOLUTE COUNT: 2.9 K/UL (ref 1.8–7.7)
SODIUM BLD-SCNC: 140 MMOL/L (ref 135–144)
WBC # BLD: 6.2 K/UL (ref 3.5–11)
WBC # BLD: ABNORMAL 10*3/UL

## 2017-09-18 PROCEDURE — 85025 COMPLETE CBC W/AUTO DIFF WBC: CPT

## 2017-09-18 PROCEDURE — 6370000000 HC RX 637 (ALT 250 FOR IP): Performed by: EMERGENCY MEDICINE

## 2017-09-18 PROCEDURE — 99285 EMERGENCY DEPT VISIT HI MDM: CPT

## 2017-09-18 PROCEDURE — G0378 HOSPITAL OBSERVATION PER HR: HCPCS

## 2017-09-18 PROCEDURE — 84484 ASSAY OF TROPONIN QUANT: CPT

## 2017-09-18 PROCEDURE — 93005 ELECTROCARDIOGRAM TRACING: CPT

## 2017-09-18 PROCEDURE — 96372 THER/PROPH/DIAG INJ SC/IM: CPT

## 2017-09-18 PROCEDURE — 2580000003 HC RX 258: Performed by: EMERGENCY MEDICINE

## 2017-09-18 PROCEDURE — 6360000002 HC RX W HCPCS: Performed by: EMERGENCY MEDICINE

## 2017-09-18 PROCEDURE — 93306 TTE W/DOPPLER COMPLETE: CPT

## 2017-09-18 PROCEDURE — 96374 THER/PROPH/DIAG INJ IV PUSH: CPT

## 2017-09-18 PROCEDURE — 71020 XR CHEST STANDARD TWO VW: CPT

## 2017-09-18 PROCEDURE — 82947 ASSAY GLUCOSE BLOOD QUANT: CPT

## 2017-09-18 PROCEDURE — 80048 BASIC METABOLIC PNL TOTAL CA: CPT

## 2017-09-18 RX ORDER — M-VIT,TX,IRON,MINS/CALC/FOLIC 27MG-0.4MG
1 TABLET ORAL DAILY
Status: DISCONTINUED | OUTPATIENT
Start: 2017-09-18 | End: 2017-09-19 | Stop reason: HOSPADM

## 2017-09-18 RX ORDER — NICOTINE POLACRILEX 4 MG
15 LOZENGE BUCCAL PRN
Status: DISCONTINUED | OUTPATIENT
Start: 2017-09-18 | End: 2017-09-19 | Stop reason: HOSPADM

## 2017-09-18 RX ORDER — ACETAMINOPHEN 500 MG
1000 TABLET ORAL ONCE
Status: COMPLETED | OUTPATIENT
Start: 2017-09-18 | End: 2017-09-18

## 2017-09-18 RX ORDER — DEXTROSE MONOHYDRATE 25 G/50ML
12.5 INJECTION, SOLUTION INTRAVENOUS PRN
Status: DISCONTINUED | OUTPATIENT
Start: 2017-09-18 | End: 2017-09-19 | Stop reason: HOSPADM

## 2017-09-18 RX ORDER — ASPIRIN 81 MG/1
81 TABLET, CHEWABLE ORAL DAILY
Status: DISCONTINUED | OUTPATIENT
Start: 2017-09-18 | End: 2017-09-19 | Stop reason: HOSPADM

## 2017-09-18 RX ORDER — ATORVASTATIN CALCIUM 40 MG/1
40 TABLET, FILM COATED ORAL DAILY
Status: DISCONTINUED | OUTPATIENT
Start: 2017-09-18 | End: 2017-09-19 | Stop reason: HOSPADM

## 2017-09-18 RX ORDER — LOSARTAN POTASSIUM 50 MG/1
100 TABLET ORAL DAILY
Status: DISCONTINUED | OUTPATIENT
Start: 2017-09-18 | End: 2017-09-19 | Stop reason: HOSPADM

## 2017-09-18 RX ORDER — AMLODIPINE BESYLATE 5 MG/1
5 TABLET ORAL DAILY
Status: DISCONTINUED | OUTPATIENT
Start: 2017-09-19 | End: 2017-09-19 | Stop reason: HOSPADM

## 2017-09-18 RX ORDER — FENTANYL CITRATE 50 UG/ML
25 INJECTION, SOLUTION INTRAMUSCULAR; INTRAVENOUS
Status: DISCONTINUED | OUTPATIENT
Start: 2017-09-18 | End: 2017-09-19 | Stop reason: HOSPADM

## 2017-09-18 RX ORDER — ERGOCALCIFEROL 1.25 MG/1
50000 CAPSULE ORAL WEEKLY
Status: DISCONTINUED | OUTPATIENT
Start: 2017-09-18 | End: 2017-09-19 | Stop reason: HOSPADM

## 2017-09-18 RX ORDER — MECLIZINE HCL 12.5 MG/1
12.5 TABLET ORAL 3 TIMES DAILY PRN
Status: DISCONTINUED | OUTPATIENT
Start: 2017-09-18 | End: 2017-09-19 | Stop reason: HOSPADM

## 2017-09-18 RX ORDER — LANCETS 30 GAUGE
1 EACH MISCELLANEOUS DAILY
Status: DISCONTINUED | OUTPATIENT
Start: 2017-09-18 | End: 2017-09-18 | Stop reason: RX

## 2017-09-18 RX ORDER — ALBUTEROL SULFATE 90 UG/1
2 AEROSOL, METERED RESPIRATORY (INHALATION) EVERY 6 HOURS PRN
Status: DISCONTINUED | OUTPATIENT
Start: 2017-09-18 | End: 2017-09-19 | Stop reason: HOSPADM

## 2017-09-18 RX ORDER — HYDROCHLOROTHIAZIDE 50 MG/1
50 TABLET ORAL DAILY
Status: DISCONTINUED | OUTPATIENT
Start: 2017-09-19 | End: 2017-09-19 | Stop reason: HOSPADM

## 2017-09-18 RX ORDER — ACETAMINOPHEN 325 MG/1
650 TABLET ORAL EVERY 4 HOURS PRN
Status: DISCONTINUED | OUTPATIENT
Start: 2017-09-18 | End: 2017-09-19 | Stop reason: HOSPADM

## 2017-09-18 RX ORDER — DEXTROSE MONOHYDRATE 50 MG/ML
100 INJECTION, SOLUTION INTRAVENOUS PRN
Status: DISCONTINUED | OUTPATIENT
Start: 2017-09-18 | End: 2017-09-19 | Stop reason: HOSPADM

## 2017-09-18 RX ORDER — IBUPROFEN 800 MG/1
800 TABLET ORAL ONCE
Status: DISCONTINUED | OUTPATIENT
Start: 2017-09-18 | End: 2017-09-19 | Stop reason: HOSPADM

## 2017-09-18 RX ORDER — HYDRALAZINE HYDROCHLORIDE 50 MG/1
50 TABLET, FILM COATED ORAL EVERY 6 HOURS SCHEDULED
Status: DISCONTINUED | OUTPATIENT
Start: 2017-09-18 | End: 2017-09-18

## 2017-09-18 RX ORDER — FENTANYL CITRATE 50 UG/ML
50 INJECTION, SOLUTION INTRAMUSCULAR; INTRAVENOUS
Status: DISCONTINUED | OUTPATIENT
Start: 2017-09-18 | End: 2017-09-19 | Stop reason: HOSPADM

## 2017-09-18 RX ORDER — SODIUM CHLORIDE 0.9 % (FLUSH) 0.9 %
10 SYRINGE (ML) INJECTION PRN
Status: DISCONTINUED | OUTPATIENT
Start: 2017-09-18 | End: 2017-09-19 | Stop reason: HOSPADM

## 2017-09-18 RX ORDER — CITALOPRAM 10 MG/1
10 TABLET ORAL DAILY
Status: DISCONTINUED | OUTPATIENT
Start: 2017-09-19 | End: 2017-09-19 | Stop reason: HOSPADM

## 2017-09-18 RX ORDER — ONDANSETRON 2 MG/ML
4 INJECTION INTRAMUSCULAR; INTRAVENOUS EVERY 8 HOURS PRN
Status: DISCONTINUED | OUTPATIENT
Start: 2017-09-18 | End: 2017-09-19 | Stop reason: HOSPADM

## 2017-09-18 RX ORDER — SODIUM CHLORIDE 0.9 % (FLUSH) 0.9 %
10 SYRINGE (ML) INJECTION EVERY 12 HOURS SCHEDULED
Status: DISCONTINUED | OUTPATIENT
Start: 2017-09-18 | End: 2017-09-19 | Stop reason: HOSPADM

## 2017-09-18 RX ORDER — AMLODIPINE BESYLATE 5 MG/1
5 TABLET ORAL DAILY
COMMUNITY
End: 2018-12-21 | Stop reason: SDUPTHER

## 2017-09-18 RX ORDER — ONDANSETRON 2 MG/ML
4 INJECTION INTRAMUSCULAR; INTRAVENOUS ONCE
Status: COMPLETED | OUTPATIENT
Start: 2017-09-18 | End: 2017-09-18

## 2017-09-18 RX ORDER — HYDRALAZINE HYDROCHLORIDE 50 MG/1
50 TABLET, FILM COATED ORAL 4 TIMES DAILY
COMMUNITY
End: 2017-11-06 | Stop reason: SDUPTHER

## 2017-09-18 RX ADMIN — Medication 10 ML: at 22:23

## 2017-09-18 RX ADMIN — INSULIN LISPRO 1 UNITS: 100 INJECTION, SOLUTION INTRAVENOUS; SUBCUTANEOUS at 22:23

## 2017-09-18 RX ADMIN — ACETAMINOPHEN 650 MG: 325 TABLET ORAL at 08:58

## 2017-09-18 RX ADMIN — ENOXAPARIN SODIUM 30 MG: 30 INJECTION SUBCUTANEOUS at 22:22

## 2017-09-18 RX ADMIN — INSULIN LISPRO 2 UNITS: 100 INJECTION, SOLUTION INTRAVENOUS; SUBCUTANEOUS at 18:45

## 2017-09-18 RX ADMIN — METOPROLOL TARTRATE 75 MG: 25 TABLET ORAL at 22:22

## 2017-09-18 RX ADMIN — Medication 10 ML: at 18:43

## 2017-09-18 RX ADMIN — ATORVASTATIN CALCIUM 40 MG: 40 TABLET, FILM COATED ORAL at 18:44

## 2017-09-18 RX ADMIN — LOSARTAN POTASSIUM 100 MG: 50 TABLET, FILM COATED ORAL at 18:44

## 2017-09-18 RX ADMIN — HYDRALAZINE HYDROCHLORIDE 75 MG: 50 TABLET, FILM COATED ORAL at 18:44

## 2017-09-18 RX ADMIN — ASPIRIN 81 MG: 81 TABLET, CHEWABLE ORAL at 18:44

## 2017-09-18 RX ADMIN — ONDANSETRON 4 MG: 2 INJECTION, SOLUTION INTRAMUSCULAR; INTRAVENOUS at 08:58

## 2017-09-18 RX ADMIN — MULTIPLE VITAMINS W/ MINERALS TAB 1 TABLET: TAB at 18:44

## 2017-09-18 ASSESSMENT — ENCOUNTER SYMPTOMS
COUGH: 0
VOMITING: 0
NAUSEA: 1
ABDOMINAL PAIN: 0
SHORTNESS OF BREATH: 0
WHEEZING: 0
DIARRHEA: 0

## 2017-09-18 ASSESSMENT — PAIN SCALES - GENERAL
PAINLEVEL_OUTOF10: 5
PAINLEVEL_OUTOF10: 5

## 2017-09-18 ASSESSMENT — PAIN DESCRIPTION - PAIN TYPE: TYPE: ACUTE PAIN

## 2017-09-18 ASSESSMENT — PAIN DESCRIPTION - LOCATION: LOCATION: ARM

## 2017-09-18 ASSESSMENT — PAIN DESCRIPTION - DESCRIPTORS: DESCRIPTORS: DISCOMFORT;NUMBNESS

## 2017-09-18 ASSESSMENT — PAIN DESCRIPTION - ORIENTATION: ORIENTATION: RIGHT

## 2017-09-19 VITALS
SYSTOLIC BLOOD PRESSURE: 155 MMHG | TEMPERATURE: 98.2 F | RESPIRATION RATE: 18 BRPM | DIASTOLIC BLOOD PRESSURE: 97 MMHG | BODY MASS INDEX: 46.95 KG/M2 | HEART RATE: 70 BPM | WEIGHT: 275 LBS | HEIGHT: 64 IN | OXYGEN SATURATION: 97 %

## 2017-09-19 LAB — GLUCOSE BLD-MCNC: 142 MG/DL (ref 65–105)

## 2017-09-19 PROCEDURE — 6370000000 HC RX 637 (ALT 250 FOR IP): Performed by: EMERGENCY MEDICINE

## 2017-09-19 PROCEDURE — 82947 ASSAY GLUCOSE BLOOD QUANT: CPT

## 2017-09-19 PROCEDURE — 96372 THER/PROPH/DIAG INJ SC/IM: CPT

## 2017-09-19 PROCEDURE — G0378 HOSPITAL OBSERVATION PER HR: HCPCS

## 2017-09-19 PROCEDURE — 6360000002 HC RX W HCPCS: Performed by: EMERGENCY MEDICINE

## 2017-09-19 RX ADMIN — HYDRALAZINE HYDROCHLORIDE 75 MG: 50 TABLET, FILM COATED ORAL at 09:43

## 2017-09-19 RX ADMIN — LOSARTAN POTASSIUM 100 MG: 50 TABLET, FILM COATED ORAL at 09:43

## 2017-09-19 RX ADMIN — AMLODIPINE BESYLATE 5 MG: 5 TABLET ORAL at 09:43

## 2017-09-19 RX ADMIN — MULTIPLE VITAMINS W/ MINERALS TAB 1 TABLET: TAB at 09:43

## 2017-09-19 RX ADMIN — METOPROLOL TARTRATE 75 MG: 25 TABLET ORAL at 09:43

## 2017-09-19 RX ADMIN — CITALOPRAM 10 MG: 10 TABLET, FILM COATED ORAL at 09:43

## 2017-09-19 RX ADMIN — ASPIRIN 81 MG: 81 TABLET, CHEWABLE ORAL at 09:43

## 2017-09-19 RX ADMIN — HYDROCHLOROTHIAZIDE 50 MG: 50 TABLET ORAL at 09:43

## 2017-09-19 RX ADMIN — ENOXAPARIN SODIUM 30 MG: 30 INJECTION SUBCUTANEOUS at 09:43

## 2017-10-02 ENCOUNTER — TELEPHONE (OUTPATIENT)
Dept: INTERNAL MEDICINE | Age: 62
End: 2017-10-02

## 2017-10-02 RX ORDER — MEDICAL SUPPLY, MISCELLANEOUS
EACH MISCELLANEOUS
Qty: 1 EACH | Refills: 0 | Status: SHIPPED | OUTPATIENT
Start: 2017-10-02 | End: 2018-08-20 | Stop reason: SDUPTHER

## 2017-10-05 LAB
EKG ATRIAL RATE: 73 BPM
EKG P AXIS: 0 DEGREES
EKG P-R INTERVAL: 174 MS
EKG Q-T INTERVAL: 410 MS
EKG QRS DURATION: 92 MS
EKG QTC CALCULATION (BAZETT): 451 MS
EKG R AXIS: -21 DEGREES
EKG T AXIS: 11 DEGREES
EKG VENTRICULAR RATE: 73 BPM

## 2017-10-12 ENCOUNTER — TELEPHONE (OUTPATIENT)
Dept: INTERNAL MEDICINE | Age: 62
End: 2017-10-12

## 2017-10-16 ENCOUNTER — OFFICE VISIT (OUTPATIENT)
Dept: INTERNAL MEDICINE | Age: 62
End: 2017-10-16
Payer: MEDICAID

## 2017-10-16 VITALS
WEIGHT: 276.8 LBS | RESPIRATION RATE: 12 BRPM | DIASTOLIC BLOOD PRESSURE: 79 MMHG | HEIGHT: 64 IN | HEART RATE: 72 BPM | SYSTOLIC BLOOD PRESSURE: 122 MMHG | OXYGEN SATURATION: 98 % | BODY MASS INDEX: 47.25 KG/M2

## 2017-10-16 DIAGNOSIS — K59.01 CONSTIPATION DUE TO SLOW TRANSIT: ICD-10-CM

## 2017-10-16 DIAGNOSIS — I10 ESSENTIAL HYPERTENSION: Primary | ICD-10-CM

## 2017-10-16 DIAGNOSIS — Z23 NEED FOR PROPHYLACTIC VACCINATION AND INOCULATION AGAINST INFLUENZA: ICD-10-CM

## 2017-10-16 PROCEDURE — 90471 IMMUNIZATION ADMIN: CPT | Performed by: INTERNAL MEDICINE

## 2017-10-16 PROCEDURE — 90686 IIV4 VACC NO PRSV 0.5 ML IM: CPT | Performed by: INTERNAL MEDICINE

## 2017-10-16 PROCEDURE — 99214 OFFICE O/P EST MOD 30 MIN: CPT | Performed by: INTERNAL MEDICINE

## 2017-10-16 ASSESSMENT — ENCOUNTER SYMPTOMS
EYES NEGATIVE: 1
CONSTIPATION: 1
ALLERGIC/IMMUNOLOGIC NEGATIVE: 1

## 2017-10-16 NOTE — PROGRESS NOTES
[facility] INTERNAL MEDICINE [address]  [address] [facility] [place]  [address]  Dept: [phone]  Dept Fax: [phone]    Pierce Jade is a 63 y.o. female who presents today for   Chief Complaint   Patient presents with    Follow-Up from [facility]     10/11/17 TT for dizziness and HTN     Constipation    and follow up of chronic medical problems:   Patient Active Problem List   Diagnosis    DM2 (diabetes mellitus, type 2)    HTN (hypertension)    Depression    Uncomplicated asthma    Onychomycosis    Sinus tarsi syndrome    Multinodular goiter (nontoxic)    Obesity (BMI 30-39. 9)    Vitamin D deficiency disease    Hyperlipidemia with target LDL less than 100    Hallux limitus, bilateral     Depressive disorder, not elsewhere classified    Cough    Edema of right lower extremity    Benign positional vertigo   .     Past Medical History:   Diagnosis Date    Anxiety     Asthma     CAD (coronary artery disease)     Depression     GERD (gastroesophageal reflux disease)     Hyperlipidemia     Hypertension     MI (myocardial infarction)     Obesity     Osteoarthritis     left knee    Sinus tarsi syndrome     Thyroid cyst     Type II or unspecified type diabetes mellitus without mention of complication, not stated as uncontrolled     Urinary incontinence     Vertigo        Past Surgical History:   Procedure Laterality Date    BREAST CYST EXCISION      HYSTERECTOMY         Family History   Problem Relation Age of Onset    High Blood Pressure Mother     Seizures Mother     Heart Disease Father        Social History   Substance Use Topics    Smoking status: Never Smoker    Smokeless tobacco: Never Used    Alcohol use No      Current Outpatient Prescriptions   Medication Sig Dispense Refill    psyllium (METAMUCIL SMOOTH TEXTURE) 28 % packet Take 1 packet by mouth 2 times daily Take with full glass of h20 or juice 60 each 4    Blood Pressure Monitoring (B-D ASSURE BPM/AUTO ARM CUFF) MISC Use daily for BP readings 1 each 0    amLODIPine (NORVASC) 5 MG tablet Take 5 mg by mouth daily      hydrALAZINE (APRESOLINE) 50 MG tablet Take 50 mg by mouth 4 times daily      vitamin D (ERGOCALCIFEROL) 28061 units CAPS capsule Take 1 capsule by mouth once a week 4 capsule 5    saxagliptin (ONGLYZA) 5 MG TABS tablet Take 1 tablet by mouth daily 30 tablet 3    meclizine (ANTIVERT) 12.5 MG tablet Take 1 tablet by mouth 3 times daily as needed for Dizziness 20 tablet 0    nystatin (MYCOSTATIN) 341452 UNIT/GM powder Apply topically 4 times daily. 1 Bottle 0    hydrALAZINE (APRESOLINE) 25 MG tablet TAKE 1 TABLET(ALONG WITH A 50MG TAB) BY MOUTH 4 TIMES DAILY 120 tablet 3    omeprazole (PRILOSEC) 20 MG delayed release capsule Take 1 capsule by mouth 2 times daily 60 capsule 3    ASPIRIN LOW DOSE 81 MG chewable tablet CHEW 1 TABLET BY MOUTH EVERY DAY 30 tablet 3    atorvastatin (LIPITOR) 40 MG tablet TAKE ONE TABLET BY MOUTH EVERY DAY 30 tablet 3    glipiZIDE (GLUCOTROL) 5 MG tablet Take 1 tablet by mouth 2 times daily 60 tablet 3    clotrimazole-betamethasone (LOTRISONE) 1-0.05 % cream Indications: please deliver Apply topically 2 times daily. 1 Tube 0    citalopram (CELEXA) 20 MG tablet TAKE ONE-HALF TO ONE TABLET BY MOUTH EVERY DAY (Patient taking differently: TAKE ONE-HALf tablet daily) 30 tablet 3    albuterol sulfate  (90 BASE) MCG/ACT inhaler Inhale 2 puffs into the lungs every 6 hours as needed for Wheezing 1 Inhaler 3    losartan (COZAAR) 100 MG tablet TAKE 1 TABLET BY MOUTH DAILY. 28 tablet 3    hydrochlorothiazide (HYDRODIURIL) 50 MG tablet TAKE ONE TABLET BY MOUTH EVERY DAY 30 tablet 5    Metoprolol Tartrate 75 MG TABS Take 75 mg by mouth 2 times daily 60 tablet 5    glucose blood VI test strips (TRUETEST TEST) strip 1 each by In Vitro route daily Use daily in AM before meals.  100 each 5    Lancets MISC 1 each by Does not apply route daily Use 4 times daily Diagnisis:250.0  Diabetes Mellitus 100 each 11    Alcohol Swabs (ALCOHOL WIPES) 70 % PADS Use up to 4 times a day when testing blood sugars 100 each 5    Multiple Vitamins-Minerals (THERAPEUTIC MULTIVITAMIN-MINERALS) tablet Take 1 tablet by mouth daily 30 tablet 11     No current facility-administered medications for this visit. Allergies   Allergen Reactions    Amoxicillin     Pcn [Penicillins] Hives and Itching       Health Maintenance   Topic Date Due    Cervical cancer screen  10/24/2017    Hepatitis C screen  12/31/2017 (Originally 1955)    HIV screen  12/31/2017 (Originally 10/26/1972)    Colon Cancer Screen FIT/FOBT  01/16/2018 (Originally 8/19/2017)    Diabetic retinal exam  01/18/2018 (Originally 1/1/2017)    DTaP/Tdap/Td vaccine (1 - Tdap) 01/18/2018 (Originally 10/26/1976)    Diabetic foot exam  01/19/2019 (Originally 8/31/2016)    Diabetic microalbuminuria test  04/27/2018    Lipid screen  05/01/2018    Diabetic hemoglobin A1C test  08/16/2018    Breast cancer screen  03/28/2019    Flu vaccine  Completed    Pneumococcal med risk  Completed       Controlled Substances Monitoring: Attestation: The Prescription Monitoring Report for this patient was reviewed today. Siria Dunlap MD)    HPI:     Constipation   This is a new problem. The current episode started in the past 7 days. The stool is described as firm. The patient is not on a high fiber diet. She does not exercise regularly. Treatments tried: tried eating a banana in past took metamucil. The treatment provided no relief. Hypertension   This is a chronic (was elevated when she went to the ED at [facility] last week) problem. The current episode started more than 1 year ago. The problem is unchanged. The problem is controlled. Risk factors for coronary artery disease include diabetes mellitus and dyslipidemia.  Past treatments include calcium channel blockers, direct vasodilators, beta blockers, diuretics and educational materials - see patient instructions    5. Was a self-tracking handout given in paper form or via [employer]? No  If yes, see orders or list here. Orders Placed This Encounter   Procedures    INFLUENZA, QUADV, 3 YRS AND OLDER, IM, PF, PREFILL SYR OR SDV, 0.5ML (FLUZONE QUADV, PF)    WI IMMUNIZ ADMIN,1 SINGLE/COMB VAC/TOXOID       No Follow-up on file. Patient agreed with treatment plan.     Electronically signed by Teto Zelaya MD on 10/16/2017 at 11:20 AM

## 2017-10-23 ENCOUNTER — OFFICE VISIT (OUTPATIENT)
Dept: INTERNAL MEDICINE | Age: 62
End: 2017-10-23
Payer: MEDICAID

## 2017-10-23 VITALS
SYSTOLIC BLOOD PRESSURE: 138 MMHG | HEART RATE: 89 BPM | DIASTOLIC BLOOD PRESSURE: 78 MMHG | OXYGEN SATURATION: 96 % | BODY MASS INDEX: 47.72 KG/M2 | WEIGHT: 278 LBS

## 2017-10-23 DIAGNOSIS — H61.23 BILATERAL IMPACTED CERUMEN: ICD-10-CM

## 2017-10-23 DIAGNOSIS — Z79.4 TYPE 2 DIABETES MELLITUS WITHOUT COMPLICATION, WITH LONG-TERM CURRENT USE OF INSULIN (HCC): ICD-10-CM

## 2017-10-23 DIAGNOSIS — E04.2 MULTINODULAR GOITER (NONTOXIC): Primary | ICD-10-CM

## 2017-10-23 DIAGNOSIS — E11.9 TYPE 2 DIABETES MELLITUS WITHOUT COMPLICATION, WITH LONG-TERM CURRENT USE OF INSULIN (HCC): ICD-10-CM

## 2017-10-23 DIAGNOSIS — I10 ESSENTIAL HYPERTENSION: ICD-10-CM

## 2017-10-23 PROCEDURE — G8417 CALC BMI ABV UP PARAM F/U: HCPCS | Performed by: INTERNAL MEDICINE

## 2017-10-23 PROCEDURE — 3014F SCREEN MAMMO DOC REV: CPT | Performed by: INTERNAL MEDICINE

## 2017-10-23 PROCEDURE — 3045F PR MOST RECENT HEMOGLOBIN A1C LEVEL 7.0-9.0%: CPT | Performed by: INTERNAL MEDICINE

## 2017-10-23 PROCEDURE — 1036F TOBACCO NON-USER: CPT | Performed by: INTERNAL MEDICINE

## 2017-10-23 PROCEDURE — G8427 DOCREV CUR MEDS BY ELIG CLIN: HCPCS | Performed by: INTERNAL MEDICINE

## 2017-10-23 PROCEDURE — 99214 OFFICE O/P EST MOD 30 MIN: CPT | Performed by: INTERNAL MEDICINE

## 2017-10-23 PROCEDURE — G8484 FLU IMMUNIZE NO ADMIN: HCPCS | Performed by: INTERNAL MEDICINE

## 2017-10-23 PROCEDURE — 3017F COLORECTAL CA SCREEN DOC REV: CPT | Performed by: INTERNAL MEDICINE

## 2017-10-23 RX ORDER — NYSTATIN 100000 [USP'U]/G
POWDER TOPICAL
Qty: 1 BOTTLE | Refills: 0 | Status: SHIPPED | OUTPATIENT
Start: 2017-10-23 | End: 2018-08-20 | Stop reason: SDUPTHER

## 2017-10-23 RX ORDER — CLOTRIMAZOLE AND BETAMETHASONE DIPROPIONATE 10; .64 MG/G; MG/G
CREAM TOPICAL
Qty: 1 TUBE | Refills: 0 | Status: SHIPPED | OUTPATIENT
Start: 2017-10-23 | End: 2018-08-20 | Stop reason: SDUPTHER

## 2017-10-23 ASSESSMENT — ENCOUNTER SYMPTOMS
RESPIRATORY NEGATIVE: 1
ALLERGIC/IMMUNOLOGIC NEGATIVE: 1
EYES NEGATIVE: 1
CONSTIPATION: 1

## 2017-10-23 NOTE — PROGRESS NOTES
[facility]   Progress Note      Visit Information    Have you changed or started any medications since your last visit including any over-the-counter medicines, vitamins, or herbal medicines? no   Are you having any side effects from any of your medications? -  no  Have you stopped taking any of your medications? Is so, why? -  no    Have you seen any other physician or provider since your last visit? Yes - Records Obtained  Have you had any other diagnostic tests since your last visit? No  Have you been seen in the emergency room and/or had an admission to a hospital since we last saw you? Yes, [facility] 10-11-17  Have you had your routine dental cleaning in the past 6 months? no    Have you activated your [facility] account? If not, what are your barriers?  Yes     Patient Care Team:  Linette Lockett MD as PCP - General    Medical History Review  Past Medical, Family, and Social History reviewed and does contribute to the patient presenting condition    Health Maintenance   Topic Date Due    Cervical cancer screen  10/24/2017    Hepatitis C screen  12/31/2017 (Originally 1955)    HIV screen  12/31/2017 (Originally 10/26/1972)    Colon Cancer Screen FIT/FOBT  01/16/2018 (Originally 8/19/2017)    Diabetic retinal exam  01/18/2018 (Originally 1/1/2017)    DTaP/Tdap/Td vaccine (1 - Tdap) 01/18/2018 (Originally 10/26/1976)    Diabetic foot exam  01/19/2019 (Originally 8/31/2016)    Diabetic microalbuminuria test  04/27/2018    Lipid screen  05/01/2018    Diabetic hemoglobin A1C test  08/16/2018    Breast cancer screen  03/28/2019    Flu vaccine  Completed    Pneumococcal med risk  Completed         Date of patient's visit: 10/23/2017  Patient's Name:  Corinne Mcghee                   YOB: 1955        PCP:  Linette Lockett MD    Corinne Mcghee is a 63 y.o. female who presents for   Chief Complaint   Patient presents with   [facility] Diabetes    Hypertension    and follow up of chronic medical problems. Patient Active Problem List   Diagnosis    DM2 (diabetes mellitus, type 2)    HTN (hypertension)    Depression    Uncomplicated asthma    Onychomycosis    Sinus tarsi syndrome    Multinodular goiter (nontoxic)    Obesity (BMI 30-39. 9)    Vitamin D deficiency disease    Hyperlipidemia with target LDL less than 100    Hallux limitus, bilateral     Depressive disorder, not elsewhere classified    Cough    Edema of right lower extremity    Benign positional vertigo       HISTORY OF PRESENT ILLNESS:    History was obtained from the patient. Here to be followed up for DM. She reports she is eating better. Concerned her BP is slightly high today- was in ER for elevated BP recently too  She is making better lifestyle modifications. She also notes her goiter is enlarging. She was following dr Mouna Hairston and was lost to follow up. Also reports reduced hearing noted by family. Patient's allergies, medications, past medical, surgical, social and family histories were reviewed and updated as appropriate.     ALLERGIES      Allergies   Allergen Reactions    Amoxicillin     Pcn [Penicillins] Hives and Itching         MEDICATIONS:      Current Outpatient Prescriptions   Medication Sig Dispense Refill    psyllium (METAMUCIL SMOOTH TEXTURE) 28 % packet Take 1 packet by mouth 2 times daily Take with full glass of h20 or juice 60 each 4    Blood Pressure Monitoring (B-D ASSURE BPM/AUTO ARM CUFF) MISC Use daily for BP readings 1 each 0    amLODIPine (NORVASC) 5 MG tablet Take 5 mg by mouth daily      hydrALAZINE (APRESOLINE) 50 MG tablet Take 50 mg by mouth 4 times daily      vitamin D (ERGOCALCIFEROL) 67955 units CAPS capsule Take 1 capsule by mouth once a week 4 capsule 5    saxagliptin (ONGLYZA) 5 MG TABS tablet Take 1 tablet by mouth daily 30 tablet 3    meclizine (ANTIVERT) 12.5 MG tablet Take 1 tablet by mouth 3 times daily as Date    Anxiety     Asthma     CAD (coronary artery disease)     Depression     GERD (gastroesophageal reflux disease)     Hyperlipidemia     Hypertension     MI (myocardial infarction)     Obesity     Osteoarthritis     left knee    Sinus tarsi syndrome     Thyroid cyst     Type II or unspecified type diabetes mellitus without mention of complication, not stated as uncontrolled     Urinary incontinence     Vertigo      Past Surgical History:   Procedure Laterality Date    BREAST CYST EXCISION      HYSTERECTOMY         SOCIAL HISTORY      Social History   Substance Use Topics    Smoking status: Never Smoker    Smokeless tobacco: Never Used    Alcohol use No     Doris Fabian  reports that she has never smoked. She has never used smokeless tobacco.    FAMILY HISTORY:      Family History   Problem Relation Age of Onset    High Blood Pressure Mother     Seizures Mother     Heart Disease Father        REVIEW OF SYSTEMS:    Review of Systems   Constitutional: Positive for appetite change and fatigue. HENT: Negative. Eyes: Negative. Respiratory: Negative. Gastrointestinal: Positive for constipation. Endocrine: Negative. Genitourinary: Negative. Musculoskeletal: Negative. Allergic/Immunologic: Negative. Neurological: Negative. Psychiatric/Behavioral: Negative.         PHYSICAL EXAM:      Vitals:    10/23/17 1422   BP: (!) 146/84   Pulse: 89   SpO2: 96%     BP Readings from Last 3 Encounters:   10/23/17 (!) 146/84   10/16/17 122/79   09/19/17 (!) 155/97       Physical Examination: General appearance - alert, well appearing, and in no distress  Mental status - alert, oriented to person, place, and time  Eyes - pupils equal and reactive, extraocular eye movements intact  Ears- cerumen partial impaction bilaterally  Chest - clear to auscultation, no wheezes, rales or rhonchi, symmetric air entry  Heart - normal rate, regular rhythm, normal S1, S2, no murmurs, rubs, clicks or Required     Referred to Provider:   Nereida Wagner MD     Requested Specialty:   Endocrinology     Number of Visits Requested:   1       Return in about 3 months (around 1/23/2018). Patient voiced understanding and agreed to treatment plan. This note is created with the assistance of a speech-recognition program. While intending to generate a document that actually reflects the content of the visit, the document can still have some mistakes which may not have been identified and corrected by editing.     Dr Areli Velasco MD, [address] [place] [address]  Associate , Department of Internal Medicine  [address] [place] [address]  Attending [address] [facility], [place] [address]                   10/23/2017, 2:51 PM

## 2017-11-06 DIAGNOSIS — I10 ESSENTIAL HYPERTENSION: ICD-10-CM

## 2017-11-06 DIAGNOSIS — Z13.31 POSITIVE DEPRESSION SCREENING: ICD-10-CM

## 2017-11-06 RX ORDER — LOSARTAN POTASSIUM 100 MG/1
TABLET ORAL
Qty: 28 TABLET | Refills: 2 | Status: SHIPPED | OUTPATIENT
Start: 2017-11-06 | End: 2018-02-05 | Stop reason: SDUPTHER

## 2017-11-06 RX ORDER — CITALOPRAM 20 MG/1
TABLET ORAL
Qty: 30 TABLET | Refills: 0 | Status: SHIPPED | OUTPATIENT
Start: 2017-11-06 | End: 2017-12-08 | Stop reason: SDUPTHER

## 2017-11-06 RX ORDER — HYDRALAZINE HYDROCHLORIDE 50 MG/1
TABLET, FILM COATED ORAL
Qty: 120 TABLET | Refills: 0 | Status: SHIPPED | OUTPATIENT
Start: 2017-11-06 | End: 2017-12-08 | Stop reason: SDUPTHER

## 2017-11-06 NOTE — TELEPHONE ENCOUNTER
Next Visit Date:  Future Appointments  Date Time Provider [place]   1/25/2018 11:10 AM Marquis Manny MD [facility] [address] Maintenance   Topic Date Due    Cervical cancer screen  10/24/2017    Zostavax vaccine  10/26/2017    Hepatitis C screen  12/31/2017 (Originally 1955)    HIV screen  12/31/2017 (Originally 10/26/1972)    Colon Cancer Screen FIT/FOBT  01/16/2018 (Originally 8/19/2017)    Diabetic retinal exam  01/18/2018 (Originally 1/1/2017)    DTaP/Tdap/Td vaccine (1 - Tdap) 01/18/2018 (Originally 10/26/1976)    Diabetic foot exam  01/19/2019 (Originally 8/31/2016)    Diabetic microalbuminuria test  04/27/2018    Lipid screen  05/01/2018    Diabetic hemoglobin A1C test  08/16/2018    Breast cancer screen  03/28/2019    Flu vaccine  Completed    Pneumococcal med risk  Completed       Hemoglobin A1C (%)   Date Value   08/16/2017 7.0   04/27/2017 6.8   08/12/2016 7.7             ( goal A1C is < 7)   Microalb/Crt. Ratio (mcg/mg creat)   Date Value   04/27/2017 12     LDL Cholesterol (mg/dL)   Date Value   05/01/2017 52       (goal LDL is <100)   AST (U/L)   Date Value   11/27/2015 16     ALT (U/L)   Date Value   11/27/2015 14     BUN (mg/dL)   Date Value   09/18/2017 14     BP Readings from Last 3 Encounters:   10/23/17 138/78   10/16/17 122/79   09/19/17 (!) 155/97          (goal 120/80)    All Future Testing planned in [facility]  Lab Frequency Next Occurrence   Microalbumin, Ur Once 04/27/2017   TSH with Reflex Once 01/31/2018   US THYROID Once 10/23/2017               Patient Active Problem List:     DM2 (diabetes mellitus, type 2)     HTN (hypertension)     Depression     Uncomplicated asthma     Onychomycosis     Sinus tarsi syndrome     Multinodular goiter (nontoxic)     Obesity (BMI 30-39. 9)     Vitamin D deficiency disease     Hyperlipidemia with target LDL less than 100     Hallux limitus, bilateral      Depressive disorder, not elsewhere classified     Cough     Edema

## 2017-11-17 ENCOUNTER — HOSPITAL ENCOUNTER (OUTPATIENT)
Dept: ULTRASOUND IMAGING | Age: 62
Discharge: HOME | End: 2017-11-17
Payer: MEDICAID

## 2017-11-17 DIAGNOSIS — E04.2 MULTINODULAR GOITER (NONTOXIC): ICD-10-CM

## 2017-11-17 PROCEDURE — 76536 US EXAM OF HEAD AND NECK: CPT

## 2017-12-08 DIAGNOSIS — Z13.31 POSITIVE DEPRESSION SCREENING: ICD-10-CM

## 2017-12-08 DIAGNOSIS — E78.5 HYPERLIPIDEMIA WITH TARGET LDL LESS THAN 100: ICD-10-CM

## 2017-12-08 DIAGNOSIS — E11.9 TYPE 2 DIABETES MELLITUS WITHOUT COMPLICATION (HCC): ICD-10-CM

## 2017-12-08 DIAGNOSIS — K21.9 GASTROESOPHAGEAL REFLUX DISEASE WITHOUT ESOPHAGITIS: ICD-10-CM

## 2017-12-08 NOTE — TELEPHONE ENCOUNTER
Next Visit Date:  Future Appointments  Date Time Provider [place]   1/25/2018 11:10 AM Roderick Leach MD [facility] [address] Maintenance   Topic Date Due    Cervical cancer screen  10/24/2017    Zostavax vaccine  10/26/2017    Hepatitis C screen  12/31/2017 (Originally 1955)    HIV screen  12/31/2017 (Originally 10/26/1972)    Colon Cancer Screen FIT/FOBT  01/16/2018 (Originally 8/19/2017)    Diabetic retinal exam  01/18/2018 (Originally 1/1/2017)    DTaP/Tdap/Td vaccine (1 - Tdap) 01/18/2018 (Originally 10/26/1976)    Diabetic foot exam  01/19/2019 (Originally 8/31/2016)    Diabetic microalbuminuria test  04/27/2018    Lipid screen  05/01/2018    Diabetic hemoglobin A1C test  08/16/2018    Breast cancer screen  03/28/2019    Flu vaccine  Completed    Pneumococcal med risk  Completed       Hemoglobin A1C (%)   Date Value   08/16/2017 7.0   04/27/2017 6.8   08/12/2016 7.7             ( goal A1C is < 7)   Microalb/Crt. Ratio (mcg/mg creat)   Date Value   04/27/2017 12     LDL Cholesterol (mg/dL)   Date Value   05/01/2017 52       (goal LDL is <100)   AST (U/L)   Date Value   11/27/2015 16     ALT (U/L)   Date Value   11/27/2015 14     BUN (mg/dL)   Date Value   09/18/2017 14     BP Readings from Last 3 Encounters:   10/23/17 138/78   10/16/17 122/79   09/19/17 (!) 155/97          (goal 120/80)    All Future Testing planned in [facility]  Lab Frequency Next Occurrence   Microalbumin, Ur Once 04/27/2017   TSH with Reflex Once 01/31/2018               Patient Active Problem List:     DM2 (diabetes mellitus, type 2)     HTN (hypertension)     Depression     Uncomplicated asthma     Onychomycosis     Sinus tarsi syndrome     Multinodular goiter (nontoxic)     Obesity (BMI 30-39. 9)     Vitamin D deficiency disease     Hyperlipidemia with target LDL less than 100     Hallux limitus, bilateral      Depressive disorder, not elsewhere classified     Cough     Edema of right lower extremity Benign positional vertigo

## 2017-12-11 DIAGNOSIS — E04.2 MULTINODULAR GOITER (NONTOXIC): Primary | ICD-10-CM

## 2017-12-11 RX ORDER — HYDRALAZINE HYDROCHLORIDE 25 MG/1
TABLET, FILM COATED ORAL
Qty: 112 TABLET | Refills: 0 | Status: SHIPPED | OUTPATIENT
Start: 2017-12-11 | End: 2017-12-27 | Stop reason: DRUGHIGH

## 2017-12-11 RX ORDER — HYDRALAZINE HYDROCHLORIDE 50 MG/1
TABLET, FILM COATED ORAL
Qty: 112 TABLET | Refills: 0 | Status: SHIPPED | OUTPATIENT
Start: 2017-12-11 | End: 2018-01-08 | Stop reason: SDUPTHER

## 2017-12-11 RX ORDER — CITALOPRAM 20 MG/1
TABLET ORAL
Qty: 28 TABLET | Refills: 0 | Status: SHIPPED | OUTPATIENT
Start: 2017-12-11 | End: 2018-01-08 | Stop reason: SDUPTHER

## 2017-12-11 RX ORDER — GLIPIZIDE 5 MG/1
TABLET ORAL
Qty: 56 TABLET | Refills: 0 | Status: SHIPPED | OUTPATIENT
Start: 2017-12-11 | End: 2018-02-05 | Stop reason: SDUPTHER

## 2017-12-11 RX ORDER — ASPIRIN 81 MG
TABLET,CHEWABLE ORAL
Qty: 28 TABLET | Refills: 0 | Status: SHIPPED | OUTPATIENT
Start: 2017-12-11 | End: 2018-01-08 | Stop reason: SDUPTHER

## 2017-12-11 RX ORDER — OMEPRAZOLE 20 MG/1
CAPSULE, DELAYED RELEASE ORAL
Qty: 56 CAPSULE | Refills: 0 | Status: SHIPPED | OUTPATIENT
Start: 2017-12-11 | End: 2018-01-08 | Stop reason: SDUPTHER

## 2017-12-11 RX ORDER — ATORVASTATIN CALCIUM 40 MG/1
TABLET, FILM COATED ORAL
Qty: 28 TABLET | Refills: 0 | Status: SHIPPED | OUTPATIENT
Start: 2017-12-11 | End: 2018-01-08 | Stop reason: SDUPTHER

## 2017-12-27 ENCOUNTER — OFFICE VISIT (OUTPATIENT)
Dept: INTERNAL MEDICINE | Age: 62
End: 2017-12-27
Payer: MEDICAID

## 2017-12-27 VITALS
HEIGHT: 64 IN | OXYGEN SATURATION: 96 % | BODY MASS INDEX: 47.29 KG/M2 | SYSTOLIC BLOOD PRESSURE: 125 MMHG | WEIGHT: 277 LBS | TEMPERATURE: 99 F | DIASTOLIC BLOOD PRESSURE: 70 MMHG | HEART RATE: 71 BPM

## 2017-12-27 DIAGNOSIS — J31.0 RHINOSINUSITIS: Primary | ICD-10-CM

## 2017-12-27 DIAGNOSIS — J32.9 RHINOSINUSITIS: Primary | ICD-10-CM

## 2017-12-27 PROCEDURE — G8484 FLU IMMUNIZE NO ADMIN: HCPCS | Performed by: FAMILY MEDICINE

## 2017-12-27 PROCEDURE — 99213 OFFICE O/P EST LOW 20 MIN: CPT | Performed by: FAMILY MEDICINE

## 2017-12-27 PROCEDURE — G8427 DOCREV CUR MEDS BY ELIG CLIN: HCPCS | Performed by: FAMILY MEDICINE

## 2017-12-27 PROCEDURE — 1036F TOBACCO NON-USER: CPT | Performed by: FAMILY MEDICINE

## 2017-12-27 PROCEDURE — G8417 CALC BMI ABV UP PARAM F/U: HCPCS | Performed by: FAMILY MEDICINE

## 2017-12-27 PROCEDURE — 3017F COLORECTAL CA SCREEN DOC REV: CPT | Performed by: FAMILY MEDICINE

## 2017-12-27 PROCEDURE — 3014F SCREEN MAMMO DOC REV: CPT | Performed by: FAMILY MEDICINE

## 2017-12-27 RX ORDER — AZITHROMYCIN 250 MG/1
TABLET, FILM COATED ORAL
Qty: 1 PACKET | Refills: 0 | Status: SHIPPED | OUTPATIENT
Start: 2017-12-27 | End: 2018-02-09

## 2017-12-27 RX ORDER — GUAIFENESIN AND DEXTROMETHORPHAN HYDROBROMIDE 600; 30 MG/1; MG/1
1 TABLET, EXTENDED RELEASE ORAL 2 TIMES DAILY
Qty: 28 TABLET | Refills: 1 | Status: SHIPPED | OUTPATIENT
Start: 2017-12-27 | End: 2018-02-09

## 2017-12-27 RX ORDER — ALBUTEROL SULFATE 90 UG/1
2 AEROSOL, METERED RESPIRATORY (INHALATION) EVERY 6 HOURS PRN
Qty: 1 INHALER | Refills: 3 | Status: SHIPPED | OUTPATIENT
Start: 2017-12-27 | End: 2018-10-02 | Stop reason: SDUPTHER

## 2017-12-27 ASSESSMENT — ENCOUNTER SYMPTOMS
NAUSEA: 0
WHEEZING: 0
SORE THROAT: 0
VOMITING: 0
COUGH: 1
RHINORRHEA: 1
DIARRHEA: 0

## 2017-12-27 NOTE — PROGRESS NOTES
Visit Information    Have you changed or started any medications since your last visit including any over-the-counter medicines, vitamins, or herbal medicines? no   Are you having any side effects from any of your medications? -  no  Have you stopped taking any of your medications? Is so, why? -  no    Have you seen any other physician or provider since your last visit? No  Have you had any other diagnostic tests since your last visit? No  Have you been seen in the emergency room and/or had an admission to a hospital since we last saw you? No  Have you had your routine dental cleaning in the past 6 months? no    Have you activated your [employer] account? If not, what are your barriers?  Yes     Patient Care Team:  Roderick Leach MD as PCP - General    Medical History Review  Past Medical, Family, and Social History reviewed and does contribute to the patient presenting condition    Health Maintenance   Topic Date Due    Cervical cancer screen  10/24/2017    Hepatitis C screen  12/31/2017 (Originally 1955)    HIV screen  12/31/2017 (Originally 10/26/1972)    Colon Cancer Screen FIT/FOBT  01/16/2018 (Originally 8/19/2017)    Diabetic retinal exam  01/18/2018 (Originally 1/1/2017)    DTaP/Tdap/Td vaccine (1 - Tdap) 01/18/2018 (Originally 10/26/1976)    Diabetic foot exam  01/19/2019 (Originally 8/31/2016)    Zostavax vaccine  01/19/2019 (Originally 10/26/2017)    Diabetic microalbuminuria test  04/27/2018    Lipid screen  05/01/2018    Diabetic hemoglobin A1C test  08/16/2018    Breast cancer screen  03/28/2019    Flu vaccine  Completed    Pneumococcal med risk  Completed

## 2017-12-27 NOTE — PROGRESS NOTES
Subjective:      Patient ID: Felisha Nassar is a 63 y.o. female. HPI  C/o sinus congestion, cough which is mostly dry, post nasal drip. These sx have been going on for a month or so. It started to get better but then got worse. Took cough syrup otc but didn't help. No headaches. Review of Systems   Constitutional: Negative for chills and fever. HENT: Positive for congestion, postnasal drip and rhinorrhea. Negative for sore throat. Respiratory: Positive for cough. Negative for wheezing. Cardiovascular: Negative for chest pain, palpitations and leg swelling. Gastrointestinal: Negative for diarrhea, nausea and vomiting. Skin: Negative for rash. Neurological: Negative for headaches. /70   Pulse 71   Temp 99 °F (37.2 °C)   Ht 5' 4\" (1.626 m)   Wt 277 lb (125.6 kg)   SpO2 96%   BMI 47.55 kg/m²       Objective:   Physical Exam   Constitutional: No distress. obese   HENT:   Right Ear: External ear normal.   Left Ear: External ear normal.   Mouth/Throat: No oropharyngeal exudate. Post nasal drip noted  Sinus tenderness in frontal and maxillary sinuses   Eyes: Conjunctivae are normal.   Neck: Neck supple. Cardiovascular: Normal rate, regular rhythm, normal heart sounds and intact distal pulses. No murmur heard. Pulmonary/Chest: Effort normal and breath sounds normal. No respiratory distress. She has no wheezes. Lymphadenopathy:     She has cervical adenopathy. Skin: No rash noted. Assessment:      1. Rhinosinusitis            Plan:      Jerad Hernandez received counseling on the following healthy behaviors: medication adherence  Reviewed prior labs and health maintenance. Continue current medications, diet and exercise. Discussed use, benefit, and side effects of prescribed medications. Barriers to medication compliance addressed. Patient given educational materials - see patient instructions. All patient questions answered. Patient voiced understanding. Completed Refills   Requested Prescriptions     Signed Prescriptions Disp Refills    albuterol sulfate  (90 Base) MCG/ACT inhaler 1 Inhaler 3     Sig: Inhale 2 puffs into the lungs every 6 hours as needed for Wheezing    azithromycin (ZITHROMAX) 250 MG tablet 1 packet 0     Sig: Take 2 tabs (500 mg) on Day 1, and take 1 tab (250 mg) on days 2 through 5.    Dextromethorphan-Guaifenesin (MUCINEX DM)  MG TB12 28 tablet 1     Sig: Take 1 tablet by mouth 2 times daily

## 2017-12-27 NOTE — PATIENT INSTRUCTIONS
Patient Education        Upper Respiratory Infection (Cold): Care Instructions  Your Care Instructions    An upper respiratory infection, or URI, is an infection of the nose, sinuses, or throat. URIs are spread by coughs, sneezes, and direct contact. The common cold is the most frequent kind of URI. The flu and sinus infections are other kinds of URIs. Almost all URIs are caused by viruses. Antibiotics won't cure them. But you can treat most infections with home care. This may include drinking lots of fluids and taking over-the-counter pain medicine. You will probably feel better in 4 to 10 days. The doctor has checked you carefully, but problems can develop later. If you notice any problems or new symptoms, get medical treatment right away. Follow-up care is a key part of your treatment and safety. Be sure to make and go to all appointments, and call your doctor if you are having problems. It's also a good idea to know your test results and keep a list of the medicines you take. How can you care for yourself at home? · To prevent dehydration, drink plenty of fluids, enough so that your urine is light yellow or clear like water. Choose water and other caffeine-free clear liquids until you feel better. If you have kidney, heart, or liver disease and have to limit fluids, talk with your doctor before you increase the amount of fluids you drink. · Take an over-the-counter pain medicine, such as acetaminophen (Tylenol), ibuprofen (Advil, Motrin), or naproxen (Aleve). Read and follow all instructions on the label. · Before you use cough and cold medicines, check the label. These medicines may not be safe for young children or for people with certain health problems. · Be careful when taking over-the-counter cold or flu medicines and Tylenol at the same time. Many of these medicines have acetaminophen, which is Tylenol. Read the labels to make sure that you are not taking more than the recommended dose.  Too much your throat also can cause coughs. A cough is a symptom, not a disease. Most coughs stop when the cause, such as a cold, goes away. You can take a few steps at home to cough less and feel better. Follow-up care is a key part of your treatment and safety. Be sure to make and go to all appointments, and call your doctor if you are having problems. It's also a good idea to know your test results and keep a list of the medicines you take. How can you care for yourself at home? · Drink lots of water and other fluids. This helps thin the mucus and soothes a dry or sore throat. Honey or lemon juice in hot water or tea may ease a dry cough. · Take cough medicine as directed by your doctor. · Prop up your head on pillows to help you breathe and ease a dry cough. · Try cough drops to soothe a dry or sore throat. Cough drops don't stop a cough. Medicine-flavored cough drops are no better than candy-flavored drops or hard candy. · Do not smoke. Avoid secondhand smoke. If you need help quitting, talk to your doctor about stop-smoking programs and medicines. These can increase your chances of quitting for good. When should you call for help? Call 911 anytime you think you may need emergency care. For example, call if:  ? · You have severe trouble breathing. ?Call your doctor now or seek immediate medical care if:  ? · You cough up blood. ? · You have new or worse trouble breathing. ? · You have a new or higher fever. ? · You have a new rash. ? Watch closely for changes in your health, and be sure to contact your doctor if:  ? · You cough more deeply or more often, especially if you notice more mucus or a change in the color of your mucus. ? · You have new symptoms, such as a sore throat, an earache, or sinus pain. ? · You do not get better as expected. Where can you learn more? Go to https://darin.[employer]. org and sign in to your [employer] account.  Enter D279 in the [facility] box to learn more about \"Cough: Care Instructions. \"     If you do not have an account, please click on the \"Sign Up Now\" link. Current as of: May 12, 2017  Content Version: 11.4  © 4512-5755 [employer], [employer]orporated. Care instructions adapted under license by [facility] ([facility]). If you have questions about a medical condition or this instruction, always ask your healthcare professional. [address] any warranty or liability for your use of this information.

## 2018-01-01 ENCOUNTER — HOSPITAL ENCOUNTER (EMERGENCY)
Age: 63
Discharge: HOME | End: 2018-01-01
Attending: EMERGENCY MEDICINE
Payer: MEDICARE

## 2018-01-01 ENCOUNTER — APPOINTMENT (OUTPATIENT)
Dept: GENERAL RADIOLOGY | Age: 63
End: 2018-01-01
Payer: MEDICARE

## 2018-01-01 VITALS
DIASTOLIC BLOOD PRESSURE: 82 MMHG | OXYGEN SATURATION: 97 % | HEART RATE: 73 BPM | RESPIRATION RATE: 16 BRPM | SYSTOLIC BLOOD PRESSURE: 144 MMHG | TEMPERATURE: 98.8 F

## 2018-01-01 DIAGNOSIS — J06.9 UPPER RESPIRATORY TRACT INFECTION, UNSPECIFIED TYPE: Primary | ICD-10-CM

## 2018-01-01 PROCEDURE — 99283 EMERGENCY DEPT VISIT LOW MDM: CPT

## 2018-01-01 PROCEDURE — 71046 X-RAY EXAM CHEST 2 VIEWS: CPT

## 2018-01-01 RX ORDER — FLUTICASONE PROPIONATE 50 MCG
1 SPRAY, SUSPENSION (ML) NASAL DAILY
Qty: 1 BOTTLE | Refills: 0 | Status: SHIPPED | OUTPATIENT
Start: 2018-01-01 | End: 2018-09-13

## 2018-01-01 ASSESSMENT — ENCOUNTER SYMPTOMS
COUGH: 1
ABDOMINAL PAIN: 0
VOMITING: 0
SINUS PRESSURE: 1
RHINORRHEA: 1
SHORTNESS OF BREATH: 0
NAUSEA: 0

## 2018-01-01 NOTE — ED PROVIDER NOTES
[address] [facility]     Emergency Department     Faculty Attestation    I performed a history and physical examination of the patient and discussed management with the resident. I have reviewed and agree with the residents findings including all diagnostic interpretations, and treatment plans as written. Any areas of disagreement are noted on the chart. I was personally present for the key portions of any procedures. I have documented in the chart those procedures where I was not present during the key portions. I have reviewed the emergency nurses triage note. I agree with the chief complaint, past medical history, past surgical history, allergies, medications, social and family history as documented unless otherwise noted below. Documentation of the HPI, Physical Exam and Medical Decision Making performed by scribblake is based on my personal performance of the HPI, PE and MDM. For Physician Assistant/ Nurse Practitioner cases/documentation I have personally evaluated this patient and have completed at least one if not all key elements of the E/M (history, physical exam, and MDM). Additional findings are as noted. Primary Care Physician: Catie Azar MD    History: This is a 63 y.o. female who presents to the Emergency Department with complaint of Surgeon cough and sneezing. This is been ongoing for last week. The patient has received azithromycin from her primary care physician and stay has not improved. Physical:   oral temperature is 98.8 °F (37.1 °C). Her blood pressure is 144/82 (abnormal) and her pulse is 73. Her respiration is 16 and oxygen saturation is 97%. History of pharynx without information exudate, lungs are clear auscultation bilaterally, abdomen soft nontender    Impression: Viral illness    Plan: Chest x-ray      Dylan Lockett.  Jaime Iglesias MD, [facility]  Attending Emergency Medicine Physician        Ambrocio Ugarte MD  01/01/18 9637

## 2018-01-02 ENCOUNTER — TELEPHONE (OUTPATIENT)
Dept: INTERNAL MEDICINE CLINIC | Age: 63
End: 2018-01-02

## 2018-01-08 DIAGNOSIS — K21.9 GASTROESOPHAGEAL REFLUX DISEASE WITHOUT ESOPHAGITIS: ICD-10-CM

## 2018-01-08 DIAGNOSIS — Z13.31 POSITIVE DEPRESSION SCREENING: ICD-10-CM

## 2018-01-08 DIAGNOSIS — E78.5 HYPERLIPIDEMIA WITH TARGET LDL LESS THAN 100: ICD-10-CM

## 2018-01-08 DIAGNOSIS — E11.9 TYPE 2 DIABETES MELLITUS WITHOUT COMPLICATION (HCC): ICD-10-CM

## 2018-01-08 RX ORDER — ATORVASTATIN CALCIUM 40 MG/1
TABLET, FILM COATED ORAL
Qty: 30 TABLET | Refills: 0 | Status: SHIPPED | OUTPATIENT
Start: 2018-01-08 | End: 2018-02-05 | Stop reason: SDUPTHER

## 2018-01-08 RX ORDER — ASPIRIN 81 MG
TABLET,CHEWABLE ORAL
Qty: 30 TABLET | Refills: 0 | Status: SHIPPED | OUTPATIENT
Start: 2018-01-08 | End: 2018-02-05 | Stop reason: SDUPTHER

## 2018-01-08 RX ORDER — METOPROLOL TARTRATE 50 MG/1
TABLET, FILM COATED ORAL
Qty: 90 TABLET | Refills: 5 | Status: SHIPPED | OUTPATIENT
Start: 2018-01-08 | End: 2018-06-22 | Stop reason: SDUPTHER

## 2018-01-08 RX ORDER — OMEPRAZOLE 20 MG/1
CAPSULE, DELAYED RELEASE ORAL
Qty: 60 CAPSULE | Refills: 0 | Status: SHIPPED | OUTPATIENT
Start: 2018-01-08 | End: 2018-02-05 | Stop reason: SDUPTHER

## 2018-01-08 RX ORDER — HYDRALAZINE HYDROCHLORIDE 50 MG/1
TABLET, FILM COATED ORAL
Qty: 120 TABLET | Refills: 0 | Status: SHIPPED | OUTPATIENT
Start: 2018-01-08 | End: 2018-02-05 | Stop reason: SDUPTHER

## 2018-01-08 RX ORDER — CITALOPRAM 20 MG/1
TABLET ORAL
Qty: 30 TABLET | Refills: 0 | Status: SHIPPED | OUTPATIENT
Start: 2018-01-08 | End: 2018-02-05 | Stop reason: SDUPTHER

## 2018-01-08 RX ORDER — HYDRALAZINE HYDROCHLORIDE 25 MG/1
TABLET, FILM COATED ORAL
Qty: 120 TABLET | Refills: 0 | Status: SHIPPED | OUTPATIENT
Start: 2018-01-08 | End: 2018-02-05 | Stop reason: SDUPTHER

## 2018-02-05 DIAGNOSIS — K21.9 GASTROESOPHAGEAL REFLUX DISEASE WITHOUT ESOPHAGITIS: ICD-10-CM

## 2018-02-05 DIAGNOSIS — I10 ESSENTIAL HYPERTENSION: ICD-10-CM

## 2018-02-05 DIAGNOSIS — E78.5 HYPERLIPIDEMIA WITH TARGET LDL LESS THAN 100: ICD-10-CM

## 2018-02-05 DIAGNOSIS — E11.9 TYPE 2 DIABETES MELLITUS WITHOUT COMPLICATION (HCC): ICD-10-CM

## 2018-02-05 DIAGNOSIS — Z13.31 POSITIVE DEPRESSION SCREENING: ICD-10-CM

## 2018-02-05 RX ORDER — ASPIRIN 81 MG
TABLET,CHEWABLE ORAL
Qty: 30 TABLET | Refills: 0 | Status: SHIPPED | OUTPATIENT
Start: 2018-02-05 | End: 2018-03-07 | Stop reason: SDUPTHER

## 2018-02-05 RX ORDER — GLIPIZIDE 5 MG/1
TABLET ORAL
Qty: 60 TABLET | Refills: 0 | Status: SHIPPED | OUTPATIENT
Start: 2018-02-05 | End: 2018-03-07 | Stop reason: SDUPTHER

## 2018-02-05 RX ORDER — HYDRALAZINE HYDROCHLORIDE 25 MG/1
TABLET, FILM COATED ORAL
Qty: 120 TABLET | Refills: 0 | Status: SHIPPED | OUTPATIENT
Start: 2018-02-05 | End: 2018-03-07 | Stop reason: SDUPTHER

## 2018-02-05 RX ORDER — HYDROCHLOROTHIAZIDE 50 MG/1
TABLET ORAL
Qty: 30 TABLET | Refills: 5 | Status: SHIPPED | OUTPATIENT
Start: 2018-02-05 | End: 2018-03-07 | Stop reason: SDUPTHER

## 2018-02-05 RX ORDER — LOSARTAN POTASSIUM 100 MG/1
TABLET ORAL
Qty: 28 TABLET | Refills: 2 | Status: SHIPPED | OUTPATIENT
Start: 2018-02-05 | End: 2018-04-25 | Stop reason: SDUPTHER

## 2018-02-05 RX ORDER — HYDRALAZINE HYDROCHLORIDE 50 MG/1
TABLET, FILM COATED ORAL
Qty: 120 TABLET | Refills: 0 | Status: SHIPPED | OUTPATIENT
Start: 2018-02-05 | End: 2018-06-25

## 2018-02-05 RX ORDER — CITALOPRAM 20 MG/1
TABLET ORAL
Qty: 30 TABLET | Refills: 0 | Status: SHIPPED | OUTPATIENT
Start: 2018-02-05 | End: 2018-03-07 | Stop reason: SDUPTHER

## 2018-02-05 RX ORDER — OMEPRAZOLE 20 MG/1
CAPSULE, DELAYED RELEASE ORAL
Qty: 60 CAPSULE | Refills: 0 | Status: SHIPPED | OUTPATIENT
Start: 2018-02-05 | End: 2018-02-19

## 2018-02-05 RX ORDER — ATORVASTATIN CALCIUM 40 MG/1
TABLET, FILM COATED ORAL
Qty: 30 TABLET | Refills: 0 | Status: SHIPPED | OUTPATIENT
Start: 2018-02-05 | End: 2018-03-07 | Stop reason: SDUPTHER

## 2018-02-09 ENCOUNTER — HOSPITAL ENCOUNTER (EMERGENCY)
Age: 63
Discharge: HOME | End: 2018-02-09
Attending: EMERGENCY MEDICINE
Payer: MEDICARE

## 2018-02-09 ENCOUNTER — APPOINTMENT (OUTPATIENT)
Dept: GENERAL RADIOLOGY | Age: 63
End: 2018-02-09
Payer: MEDICARE

## 2018-02-09 VITALS
WEIGHT: 275 LBS | TEMPERATURE: 98.5 F | SYSTOLIC BLOOD PRESSURE: 150 MMHG | HEART RATE: 72 BPM | BODY MASS INDEX: 47.2 KG/M2 | DIASTOLIC BLOOD PRESSURE: 75 MMHG | RESPIRATION RATE: 17 BRPM | OXYGEN SATURATION: 97 %

## 2018-02-09 DIAGNOSIS — R51.9 SINUS HEADACHE: Primary | ICD-10-CM

## 2018-02-09 DIAGNOSIS — B34.9 ACUTE VIRAL SYNDROME: ICD-10-CM

## 2018-02-09 LAB
-: NORMAL
ABSOLUTE EOS #: 0.28 K/UL (ref 0–0.44)
ABSOLUTE IMMATURE GRANULOCYTE: <0.03 K/UL (ref 0–0.3)
ABSOLUTE LYMPH #: 2.16 K/UL (ref 1.1–3.7)
ABSOLUTE MONO #: 0.82 K/UL (ref 0.1–1.2)
ALBUMIN SERPL-MCNC: 3.9 G/DL (ref 3.5–5.2)
ALBUMIN/GLOBULIN RATIO: 1 (ref 1–2.5)
ALP BLD-CCNC: 55 U/L (ref 35–104)
ALT SERPL-CCNC: 21 U/L (ref 5–33)
AMORPHOUS: NORMAL
ANION GAP SERPL CALCULATED.3IONS-SCNC: 11 MMOL/L (ref 9–17)
AST SERPL-CCNC: 23 U/L
BACTERIA: NORMAL
BASOPHILS # BLD: 1 % (ref 0–2)
BASOPHILS ABSOLUTE: 0.09 K/UL (ref 0–0.2)
BILIRUB SERPL-MCNC: 0.25 MG/DL (ref 0.3–1.2)
BILIRUBIN URINE: NEGATIVE
BNP INTERPRETATION: NORMAL
BUN BLDV-MCNC: 14 MG/DL (ref 8–23)
BUN/CREAT BLD: ABNORMAL (ref 9–20)
CALCIUM SERPL-MCNC: 8.9 MG/DL (ref 8.6–10.4)
CASTS UA: NORMAL /LPF (ref 0–8)
CHLORIDE BLD-SCNC: 102 MMOL/L (ref 98–107)
CO2: 27 MMOL/L (ref 20–31)
COLOR: YELLOW
COMMENT UA: ABNORMAL
CREAT SERPL-MCNC: 0.51 MG/DL (ref 0.5–0.9)
CRYSTALS, UA: NORMAL /HPF
DIFFERENTIAL TYPE: ABNORMAL
DIRECT EXAM: NORMAL
EKG ATRIAL RATE: 71 BPM
EKG P AXIS: 7 DEGREES
EKG P-R INTERVAL: 176 MS
EKG Q-T INTERVAL: 404 MS
EKG QRS DURATION: 90 MS
EKG QTC CALCULATION (BAZETT): 439 MS
EKG R AXIS: -27 DEGREES
EKG T AXIS: 3 DEGREES
EKG VENTRICULAR RATE: 71 BPM
EOSINOPHILS RELATIVE PERCENT: 4 % (ref 1–4)
EPITHELIAL CELLS UA: NORMAL /HPF (ref 0–5)
GFR AFRICAN AMERICAN: >60 ML/MIN
GFR NON-AFRICAN AMERICAN: >60 ML/MIN
GFR SERPL CREATININE-BSD FRML MDRD: ABNORMAL ML/MIN/{1.73_M2}
GFR SERPL CREATININE-BSD FRML MDRD: ABNORMAL ML/MIN/{1.73_M2}
GLUCOSE BLD-MCNC: 109 MG/DL (ref 70–99)
GLUCOSE URINE: NEGATIVE
HCT VFR BLD CALC: 39.8 % (ref 36.3–47.1)
HEMOGLOBIN: 12.7 G/DL (ref 11.9–15.1)
IMMATURE GRANULOCYTES: 0 %
KETONES, URINE: NEGATIVE
LEUKOCYTE ESTERASE, URINE: ABNORMAL
LYMPHOCYTES # BLD: 31 % (ref 24–43)
Lab: NORMAL
MCH RBC QN AUTO: 26.9 PG (ref 25.2–33.5)
MCHC RBC AUTO-ENTMCNC: 31.9 G/DL (ref 28.4–34.8)
MCV RBC AUTO: 84.3 FL (ref 82.6–102.9)
MONOCYTES # BLD: 12 % (ref 3–12)
MUCUS: NORMAL
NITRITE, URINE: NEGATIVE
NRBC AUTOMATED: 0 PER 100 WBC
OTHER OBSERVATIONS UA: NORMAL
PDW BLD-RTO: 17.3 % (ref 11.8–14.4)
PH UA: 7 (ref 5–8)
PLATELET # BLD: 240 K/UL (ref 138–453)
PLATELET ESTIMATE: ABNORMAL
PMV BLD AUTO: 11.6 FL (ref 8.1–13.5)
POC TROPONIN I: 0 NG/ML (ref 0–0.1)
POC TROPONIN I: 0 NG/ML (ref 0–0.1)
POC TROPONIN INTERP: NORMAL
POC TROPONIN INTERP: NORMAL
POTASSIUM SERPL-SCNC: 4.3 MMOL/L (ref 3.7–5.3)
PRO-BNP: 109 PG/ML
PROTEIN UA: NEGATIVE
RBC # BLD: 4.72 M/UL (ref 3.95–5.11)
RBC # BLD: ABNORMAL 10*6/UL
RBC UA: NORMAL /HPF (ref 0–4)
RENAL EPITHELIAL, UA: NORMAL /HPF
SEG NEUTROPHILS: 52 % (ref 36–65)
SEGMENTED NEUTROPHILS ABSOLUTE COUNT: 3.6 K/UL (ref 1.5–8.1)
SODIUM BLD-SCNC: 140 MMOL/L (ref 135–144)
SPECIFIC GRAVITY UA: 1.01 (ref 1–1.03)
SPECIMEN DESCRIPTION: NORMAL
STATUS: NORMAL
TOTAL PROTEIN: 7.7 G/DL (ref 6.4–8.3)
TRICHOMONAS: NORMAL
TURBIDITY: CLEAR
URINE HGB: NEGATIVE
UROBILINOGEN, URINE: NORMAL
WBC # BLD: 7 K/UL (ref 3.5–11.3)
WBC # BLD: ABNORMAL 10*3/UL
WBC UA: NORMAL /HPF (ref 0–5)
YEAST: NORMAL

## 2018-02-09 PROCEDURE — 80053 COMPREHEN METABOLIC PANEL: CPT

## 2018-02-09 PROCEDURE — 87804 INFLUENZA ASSAY W/OPTIC: CPT

## 2018-02-09 PROCEDURE — 99285 EMERGENCY DEPT VISIT HI MDM: CPT

## 2018-02-09 PROCEDURE — 84484 ASSAY OF TROPONIN QUANT: CPT

## 2018-02-09 PROCEDURE — 71046 X-RAY EXAM CHEST 2 VIEWS: CPT

## 2018-02-09 PROCEDURE — 81001 URINALYSIS AUTO W/SCOPE: CPT

## 2018-02-09 PROCEDURE — 93005 ELECTROCARDIOGRAM TRACING: CPT

## 2018-02-09 PROCEDURE — 6370000000 HC RX 637 (ALT 250 FOR IP): Performed by: PHYSICIAN ASSISTANT

## 2018-02-09 PROCEDURE — 87086 URINE CULTURE/COLONY COUNT: CPT

## 2018-02-09 PROCEDURE — 83880 ASSAY OF NATRIURETIC PEPTIDE: CPT

## 2018-02-09 PROCEDURE — 85025 COMPLETE CBC W/AUTO DIFF WBC: CPT

## 2018-02-09 RX ORDER — ACETAMINOPHEN 325 MG/1
650 TABLET ORAL EVERY 6 HOURS PRN
Qty: 40 TABLET | Refills: 0 | Status: SHIPPED | OUTPATIENT
Start: 2018-02-09 | End: 2019-01-18

## 2018-02-09 RX ORDER — IBUPROFEN 800 MG/1
800 TABLET ORAL EVERY 8 HOURS PRN
Qty: 30 TABLET | Refills: 0 | Status: SHIPPED | OUTPATIENT
Start: 2018-02-09 | End: 2018-04-03

## 2018-02-09 RX ORDER — ASPIRIN 81 MG/1
324 TABLET, CHEWABLE ORAL ONCE
Status: COMPLETED | OUTPATIENT
Start: 2018-02-09 | End: 2018-02-09

## 2018-02-09 RX ORDER — CETIRIZINE HYDROCHLORIDE 10 MG/1
10 TABLET ORAL DAILY
Qty: 14 TABLET | Refills: 0 | Status: SHIPPED | OUTPATIENT
Start: 2018-02-09 | End: 2018-02-23

## 2018-02-09 RX ORDER — ACETAMINOPHEN 325 MG/1
650 TABLET ORAL ONCE
Status: COMPLETED | OUTPATIENT
Start: 2018-02-09 | End: 2018-02-09

## 2018-02-09 RX ORDER — SODIUM CHLORIDE/ALOE VERA
GEL (GRAM) NASAL
Qty: 14.1 G | Refills: 0 | Status: SHIPPED | OUTPATIENT
Start: 2018-02-09 | End: 2018-09-13

## 2018-02-09 RX ADMIN — ASPIRIN 81 MG 324 MG: 81 TABLET ORAL at 15:15

## 2018-02-09 RX ADMIN — ACETAMINOPHEN 650 MG: 325 TABLET ORAL at 15:28

## 2018-02-09 ASSESSMENT — PAIN SCALES - GENERAL: PAINLEVEL_OUTOF10: 7

## 2018-02-09 ASSESSMENT — ENCOUNTER SYMPTOMS
ALLERGIC/IMMUNOLOGIC NEGATIVE: 1
EYES NEGATIVE: 1
SORE THROAT: 1
SINUS PAIN: 1
SINUS PRESSURE: 1
GASTROINTESTINAL NEGATIVE: 1
COUGH: 1

## 2018-02-09 ASSESSMENT — HEART SCORE: ECG: 1

## 2018-02-09 ASSESSMENT — PAIN DESCRIPTION - DESCRIPTORS: DESCRIPTORS: HEADACHE

## 2018-02-09 NOTE — ED PROVIDER NOTES
Pulse: 74, Resp: 17, BP: (!) 147/76, SpO2: 99 %    This patient is a 63 y.o. Female with chest pain, headache. Initially febrile. No longer febrile at this time. Flu swab negative. Remainder of workup is unremarkable. Plan for 2nd set of troponin and hopeful discharge.     OUTSTANDING TASKS / RECOMMENDATIONS:    1. 2nd set troponin      Blaze Lindsey MD  Attending Emergency Physician  [address]  ED       Bre Mccall MD  02/09/18 3639

## 2018-02-09 NOTE — ED PROVIDER NOTES
[address]  ED  Emergency Department Encounter  Mid Level Provider     Pt Name: Ying Garcia  MRN: [id number]  [address] 1955  Date of evaluation: 2/9/18  PCP:  Blane Austin MD    [address] [place] [address]       Chief Complaint   Patient presents with    Chest Pain     midsternal CP last night    Headache     headache today since 1100       HISTORY OF PRESENT ILLNESS  (Location/Symptom, Timing/Onset, Context/Setting, Quality, Duration, Modifying Factors, Severity.)      Ying Garcia is a 63 y.o. female who presents with  Headache that started today when she was watching TV. She says it is on both of her temples and in her face. She's had headaches like this in the past and she says that is not the worst headache of her life. She denies any blurry vision or double vision, pain in her neck or her low back. She denies any fevers or chills. However, she does have a fever earlier today. She has had a runny nose. She has a little bit of a sore throat. She also has a cough that is nonproductive. Denies any shortness of breath. She did have an episode of chest tightness that she describes as heaviness yesterday and lasted for approximately 30 minutes. She said that it does not travel anywhere and points to her distal sternum. Says that's where it was located. She describes it as a heaviness. At the time that she was having the chest pain. She did not have any shortness of breath or feeling lightheaded or dizzy. Patient denies any abdominal pain, nausea, vomiting or diarrhea. No hematuria or dysuria. She status post hysterectomy. She says that she has had the procedure where they going to the groin and look at the heart, but she does not have stents in place. She is on several antihypertensive medications. She says that she's never that she had a heart attack a few years ago but has been good since then. She also says that she is a diabetic.   She has not taken anything for her headache. Denies any calf swelling or calf pain. She says she's never had a blood clot. She denies any recent long-distance travel, calf swelling. She is not on any hormones. No history of blood clot in the family. PAST MEDICAL / SURGICAL / SOCIAL / FAMILY HISTORY      has a past medical history of Anxiety; Asthma; CAD (coronary artery disease); Depression; GERD (gastroesophageal reflux disease); Hyperlipidemia; Hypertension; MI (myocardial infarction); Obesity; Osteoarthritis; Sinus tarsi syndrome; Thyroid cyst; Type II or unspecified type diabetes mellitus without mention of complication, not stated as uncontrolled; Urinary incontinence; and Vertigo. reviewed   has a past surgical history that includes Hysterectomy and Breast cyst excision. Reviewed, cardiac catheterization  Social History     Social History    Marital status:      Spouse name: N/A    Number of children: N/A    Years of education: N/A     Occupational History    Not on file. Social History Main Topics    Smoking status: Never Smoker    Smokeless tobacco: Never Used    Alcohol use No    Drug use: No    Sexual activity: No     Other Topics Concern    Not on file     Social History Narrative    No narrative on file       Family History   Problem Relation Age of Onset    High Blood Pressure Mother     Seizures Mother     Heart Disease Father        Allergies:  Amoxicillin and Pcn [penicillins]    Home Medications:  Prior to Admission medications    Medication Sig Start Date End Date Taking? Authorizing Provider   acetaminophen (TYLENOL) 325 MG tablet Take 2 tablets by mouth every 6 hours as needed for Pain or Fever 2/9/18  Yes Annette Lindquist PA-C   cetirizine (ZYRTEC) 10 MG tablet Take 1 tablet by mouth daily for 14 days 2/9/18 2/23/18 Yes Annette Lindquist PA-C   saline nasal gel (AYR) GEL Use intranasally, in both nostrils, daily as needed for sinus irritation.  2/9/18  Yes Beverley Escamilla Potassium 4.3 3.7 - 5.3 mmol/L    Chloride 102 98 - 107 mmol/L    CO2 27 20 - 31 mmol/L    Anion Gap 11 9 - 17 mmol/L    Alkaline Phosphatase 55 35 - 104 U/L    ALT 21 5 - 33 U/L    AST 23 <32 U/L    Total Bilirubin 0.25 (L) 0.3 - 1.2 mg/dL    Total Protein 7.7 6.4 - 8.3 g/dL    Alb 3.9 3.5 - 5.2 g/dL    Albumin/Globulin Ratio 1.0 1.0 - 2.5    GFR Non-African American >60 >60 mL/min    GFR African American >60 >60 mL/min    GFR Comment          GFR Staging NOT REPORTED    UA W/REFLEX CULTURE   Result Value Ref Range    Color, UA YELLOW YEL    Turbidity UA CLEAR CLEAR    Glucose, Ur NEGATIVE NEG    Bilirubin Urine NEGATIVE NEG    Ketones, Urine NEGATIVE NEG    Specific Gravity, UA 1.013 1.005 - 1.030    Urine Hgb NEGATIVE NEG    pH, UA 7.0 5.0 - 8.0    Protein, UA NEGATIVE NEG    Urobilinogen, Urine Normal NORM    Nitrite, Urine NEGATIVE NEG    Leukocyte Esterase, Urine TRACE (A) NEG    Urinalysis Comments NOT REPORTED    Microscopic Urinalysis   Result Value Ref Range    -          WBC, UA 5 TO 10 0 - 5 /HPF    RBC, UA 0 TO 2 0 - 4 /HPF    Casts UA NOT REPORTED 0 - 8 /LPF    Crystals UA NOT REPORTED NONE /HPF    Epithelial Cells UA 0 TO 2 0 - 5 /HPF    Renal Epithelial, Urine NOT REPORTED 0 /HPF    Bacteria, UA NOT REPORTED NONE    Mucus, UA NOT REPORTED NONE    Trichomonas, UA NOT REPORTED NONE    Amorphous, UA NOT REPORTED NONE    Other Observations UA NOT REPORTED NREQ    Yeast, UA NOT REPORTED NONE   Brain Natriuretic Peptide   Result Value Ref Range    Pro- <300 pg/mL    BNP Interpretation         POCT troponin   Result Value Ref Range    POC Troponin I 0.00 0.00 - 0.10 ng/mL    POC Troponin Interp       The Troponin-I (POC) results cannot be compared to the Troponin-T results. POCT troponin   Result Value Ref Range    POC Troponin I 0.00 0.00 - 0.10 ng/mL    POC Troponin Interp       The Troponin-I (POC) results cannot be compared to the Troponin-T results. negative. Flu. No leukocytosis. electrolytes within normal limits. BNP nonelevated. Urine shows no UTI.   troponin non elevated x2    EMERGENCY DEPARTMENT COURSE:   rapid flu, Cepacol, chest x-ray, EKG, cardiac monitor. .  Troponin ×2, CBC, CMP, BNP, lipase, urinalysis, Tylenol to treat patients, fever, reassess. Patient's temperature is now at a normal range and her blood pressure is better after the Tylenol. patient feeling better after the Tylenol. patient's heart score is a 4. ED lab/imaging results discussed with the patient and her son. The discharge instructions and plan was discussed with the patient. All d/c medications were reviewed with patient and patient understands the risk/benefit of using the medications. All questions were asked and answered per pt report. Patient is in agreement with discharge plan. EKG Interpretation    Interpreted by emergency department physician    Rhythm: normal sinus   Rate: normal  Axis: left  Ectopy: none  Conduction: normal  ST Segments: no acute change  T Waves: no acute change  Q Waves: nonspecific    Clinical Impression: no acute changes    Erica Rochester Ameena     heart score is a 4. PROCEDURES:  None    FINAL IMPRESSION      1. Sinus headache    2. Acute viral syndrome          DISPOSITION / PLAN     DISPOSITION    Tylenol. Motrin. Zyrtec. Cepacol.   Nasal saline   Follow-up with PCP  Return to ER as needed  PATIENT REFERRED TO:  Sonal Jon MD  [address] [place] [address]  [phone]    Schedule an appointment as soon as possible for a visit in 1 week  As needed, If symptoms worsen    [facility]  [address] [place] [address]  [phone]    As needed, If symptoms worsen      DISCHARGE MEDICATIONS:  Discharge Medication List as of 2/9/2018  6:13 PM      START taking these medications    Details   acetaminophen (TYLENOL) 325 MG tablet Take 2 tablets by mouth every 6 hours as needed for Pain or Fever, Disp-40 tablet, R-0Print

## 2018-02-09 NOTE — ED PROVIDER NOTES
[facility]     Emergency Department     Faculty Attestation    I performed a history and physical examination of the patient and discussed management with the resident. I have reviewed and agree with the residents findings including all diagnostic interpretations, and treatment plans as written. Any areas of disagreement are noted on the chart. I was personally present for the key portions of any procedures. I have documented in the chart those procedures where I was not present during the key portions. I have reviewed the emergency nurses triage note. I agree with the chief complaint, past medical history, past surgical history, allergies, medications, social and family history as documented unless otherwise noted below. Documentation of the HPI, Physical Exam and Medical Decision Making performed by scribblake is based on my personal performance of the HPI, PE and MDM. For Physician Assistant/ Nurse Practitioner cases/documentation I have personally evaluated this patient and have completed at least one if not all key elements of the E/M (history, physical exam, and MDM). Additional findings are as noted. Primary Care Physician: Brayan Rosenthal MD    History: This is a 63 y.o. female who presents to the Emergency Department with complaint of Chest pain and headache. The patient was complaint chest pain that started yesterday that a subsequent resolved. She also complains of a headache with some nasal congestion and a cough with minimal production of sputum. Patient denies any shortness of breath nausea vomiting or abdominal pain. Physical:   weight is 275 lb (124.7 kg). Her oral temperature is 101.3 °F (38.5 °C). Her blood pressure is 184/92 (abnormal) and her pulse is 70. Her respiration is 17 and oxygen saturation is 99%.   Lungs are clear auscultation, heart regular rhythm, abdomen is soft no significant tenderness    Impression: Fever, chest pain    Plan: EKG, chest x-ray, CBC, BMP, troponin, rapid influenza, urinalysis, antipyretics      EKG Interpretation    Interpreted by me  Normal sinus rhythm with ventricular 71, moderate for discharge criteria for left ventricular hypertrophy, left axis deviation, septal infarct, age undetermined, normal QT corrected  Impression: Normal sinus rhythm, moderate voltage criteria for left ventricular hypertrophy, left axis deviation, Rule out septal infarct, age undetermined  Compared to EKG September 18, 2017, no significant change was found      (Please note that portions of this note were completed with a voice recognition program.  Efforts were made to edit the dictations but occasionally words are mis-transcribed.)    Anne Reveles MD, [facility], [place], [facility]  Attending Emergency Medicine Physician        Shalini Collier MD  02/09/18 6465

## 2018-02-10 LAB
CULTURE: NORMAL
CULTURE: NORMAL
Lab: NORMAL
SPECIMEN DESCRIPTION: NORMAL
STATUS: NORMAL

## 2018-02-19 ENCOUNTER — OFFICE VISIT (OUTPATIENT)
Dept: INTERNAL MEDICINE | Age: 63
End: 2018-02-19
Payer: MEDICARE

## 2018-02-19 VITALS
OXYGEN SATURATION: 97 % | DIASTOLIC BLOOD PRESSURE: 83 MMHG | HEART RATE: 91 BPM | BODY MASS INDEX: 46.86 KG/M2 | WEIGHT: 273 LBS | SYSTOLIC BLOOD PRESSURE: 128 MMHG

## 2018-02-19 DIAGNOSIS — E66.9 OBESITY (BMI 30-39.9): ICD-10-CM

## 2018-02-19 DIAGNOSIS — M17.0 PRIMARY OSTEOARTHRITIS OF BOTH KNEES: ICD-10-CM

## 2018-02-19 DIAGNOSIS — E11.9 TYPE 2 DIABETES MELLITUS WITHOUT COMPLICATION, WITH LONG-TERM CURRENT USE OF INSULIN (HCC): ICD-10-CM

## 2018-02-19 DIAGNOSIS — Z12.11 SCREENING FOR COLON CANCER: Primary | ICD-10-CM

## 2018-02-19 DIAGNOSIS — I10 ESSENTIAL HYPERTENSION: ICD-10-CM

## 2018-02-19 DIAGNOSIS — Z79.4 TYPE 2 DIABETES MELLITUS WITHOUT COMPLICATION, WITH LONG-TERM CURRENT USE OF INSULIN (HCC): ICD-10-CM

## 2018-02-19 PROCEDURE — 99214 OFFICE O/P EST MOD 30 MIN: CPT | Performed by: INTERNAL MEDICINE

## 2018-02-19 PROCEDURE — 3046F HEMOGLOBIN A1C LEVEL >9.0%: CPT | Performed by: INTERNAL MEDICINE

## 2018-02-19 PROCEDURE — G8427 DOCREV CUR MEDS BY ELIG CLIN: HCPCS | Performed by: INTERNAL MEDICINE

## 2018-02-19 PROCEDURE — G8417 CALC BMI ABV UP PARAM F/U: HCPCS | Performed by: INTERNAL MEDICINE

## 2018-02-19 PROCEDURE — 3014F SCREEN MAMMO DOC REV: CPT | Performed by: INTERNAL MEDICINE

## 2018-02-19 PROCEDURE — G8484 FLU IMMUNIZE NO ADMIN: HCPCS | Performed by: INTERNAL MEDICINE

## 2018-02-19 PROCEDURE — 3017F COLORECTAL CA SCREEN DOC REV: CPT | Performed by: INTERNAL MEDICINE

## 2018-02-19 PROCEDURE — 1036F TOBACCO NON-USER: CPT | Performed by: INTERNAL MEDICINE

## 2018-02-19 ASSESSMENT — PATIENT HEALTH QUESTIONNAIRE - PHQ9
SUM OF ALL RESPONSES TO PHQ QUESTIONS 1-9: 0
SUM OF ALL RESPONSES TO PHQ9 QUESTIONS 1 & 2: 0
2. FEELING DOWN, DEPRESSED OR HOPELESS: 0
1. LITTLE INTEREST OR PLEASURE IN DOING THINGS: 0

## 2018-02-19 ASSESSMENT — ENCOUNTER SYMPTOMS
GASTROINTESTINAL NEGATIVE: 1
EYES NEGATIVE: 1
COUGH: 1

## 2018-02-19 NOTE — PROGRESS NOTES
[facility]   Progress Note      Chronic Disease Visit Information    BP Readings from Last 3 Encounters:   02/09/18 (!) 150/75   01/01/18 (!) 144/82   12/27/17 125/70          Hemoglobin A1C (%)   Date Value   08/16/2017 7.0   04/27/2017 6.8   08/12/2016 7.7     Microalb/Crt. Ratio (mcg/mg creat)   Date Value   04/27/2017 12     LDL Cholesterol (mg/dL)   Date Value   05/01/2017 52     HDL (mg/dL)   Date Value   05/01/2017 53     BUN (mg/dL)   Date Value   02/09/2018 14     CREATININE (mg/dL)   Date Value   02/09/2018 0.51     Glucose (mg/dL)   Date Value   02/09/2018 109 (H)   05/14/2012 176 (H)            Have you changed or started any medications since your last visit including any over-the-counter medicines, vitamins, or herbal medicines? no   Are you having any side effects from any of your medications? -  yes - Unknown, dizziness  Have you stopped taking any of your medications? Is so, why? -  no    Have you seen any other physician or provider since your last visit? No  Have you had any other diagnostic tests since your last visit? Yes   Have you been seen in the emergency room and/or had an admission to a hospital since we last saw you? Yes - Records Obtained  Have you had your annual diabetic retinal (eye) exam? No  Have you had your routine dental cleaning in the past 6 months? yes -     Have you activated your [employer] account? If not, what are your barriers?  Yes     Patient Care Team:  Stephen Wilson MD as PCP - General         Medical History Review  Past Medical, Family, and Social History reviewed and does contribute to the patient presenting condition    Health Maintenance   Topic Date Due    Hepatitis C screen  1955    HIV screen  10/26/1972    DTaP/Tdap/Td vaccine (1 - Tdap) 10/26/1976    Diabetic retinal exam  01/01/2017    Colon Cancer Screen FIT/FOBT  08/19/2017    Cervical cancer screen  10/24/2017    Diabetic foot exam 01/19/2019 (Originally 8/31/2016)    Zostavax vaccine  01/19/2019 (Originally 10/26/2017)    Diabetic microalbuminuria test  04/27/2018    Lipid screen  05/01/2018    A1C test (Diabetic or Prediabetic)  08/16/2018    Potassium monitoring  02/09/2019    Creatinine monitoring  02/09/2019    Breast cancer screen  03/28/2019    Flu vaccine  Completed    Pneumococcal med risk  Completed         Date of patient's visit: 2/19/2018  Patient's Name:  Tyler Correia                   YOB: 1955        PCP:  Herminio An MD    Tyler Correia is a 63 y.o. female who presents for   Chief Complaint   Patient presents with    Diabetes    Hypertension    and follow up of chronic medical problems. Patient Active Problem List   Diagnosis    DM2 (diabetes mellitus, type 2)    HTN (hypertension)    Depression    Uncomplicated asthma    Onychomycosis    Sinus tarsi syndrome    Multinodular goiter (nontoxic)    Obesity (BMI 30-39. 9)    Vitamin D deficiency disease    Hyperlipidemia with target LDL less than 100    Hallux limitus, bilateral     Depressive disorder, not elsewhere classified    Cough    Edema of right lower extremity    Benign positional vertigo       HISTORY OF PRESENT ILLNESS:    History was obtained from the patient. Treatment Adherence:   Medication compliance:  compliant all of the time  Diet compliance:  compliant all of the time  Weight trend: stable  Current exercise: no regular exercise  Barriers: none    Diabetes Mellitus Type 2: Current symptoms/problems include none. Home blood sugar records: patient does not test  Any episodes of hypoglycemia? no  Eye exam current (within one year): yes  Tobacco history: She  reports that she has never smoked. She has never used smokeless tobacco.   Daily Aspirin? Yes    Hypertension:  Home blood pressure monitoring: No.  She is adherent to a low sodium diet. Patient denies chest pain and shortness of breath.   Antihypertensive medication side effects: no medication side effects noted. Use of agents associated with hypertension: none. Hyperlipidemia:  No new myalgias or GI upset on atorvastatin (Lipitor). Lab Results   Component Value Date    LABA1C 7.0 08/16/2017    LABA1C 6.8 04/27/2017    LABA1C 7.7 08/12/2016     Lab Results   Component Value Date    LABMICR 12 04/27/2017    CREATININE 0.51 02/09/2018     Lab Results   Component Value Date    ALT 21 02/09/2018    AST 23 02/09/2018     Lab Results   Component Value Date    CHOL 116 05/01/2017    TRIG 54 05/01/2017    HDL 53 05/01/2017        She has OA and I having difficulty with Gait. Patient's allergies, medications, past medical, surgical, social and family histories were reviewed and updated as appropriate. ALLERGIES      Allergies   Allergen Reactions    Amoxicillin     Pcn [Penicillins] Hives and Itching         MEDICATIONS:      Current Outpatient Prescriptions   Medication Sig Dispense Refill    acetaminophen (TYLENOL) 325 MG tablet Take 2 tablets by mouth every 6 hours as needed for Pain or Fever 40 tablet 0    cetirizine (ZYRTEC) 10 MG tablet Take 1 tablet by mouth daily for 14 days 14 tablet 0    saline nasal gel (AYR) GEL Use intranasally, in both nostrils, daily as needed for sinus irritation.  14.1 g 0    benzocaine-menthol (CEPACOL SORE THROAT) 15-3.6 MG lozenge Take 1 lozenge by mouth every 2 hours as needed for Sore Throat 40 lozenge 0    ibuprofen (ADVIL;MOTRIN) 800 MG tablet Take 1 tablet by mouth every 8 hours as needed for Pain 30 tablet 0    omeprazole (PRILOSEC) 20 MG delayed release capsule TAKE ONE CAPSULE BY MOUTH TWO TIMES A DAY 60 capsule 0    atorvastatin (LIPITOR) 40 MG tablet TAKE ONE TABLET BY MOUTH DAILY 30 tablet 0    citalopram (CELEXA) 20 MG tablet TAKE ONE-HALF TO ONE TABLET BY MOUTH DAILY 30 tablet 0    hydrALAZINE (APRESOLINE) 25 MG tablet TAKE ONE TABLET BY MOUTH (ALONG WITH 50MG TAB) FOUR TIMES A  tablet 0    wheezes, rales or rhonchi, symmetric air entry  Heart - normal rate, regular rhythm, normal S1, S2, no murmurs, rubs, clicks or gallops  Abdomen - soft, nontender, nondistended, no masses or organomegaly  Back exam - full range of motion, no tenderness, palpable spasm or pain on motion  Neurological - alert, oriented, normal speech, no focal findings or movement disorder noted  Musculoskeletal - no joint tenderness, deformity or swelling  Extremities - peripheral pulses normal, no pedal edema, no clubbing or cyanosis    LABORATORY FINDINGS:    CBC:   Lab Results   Component Value Date    WBC 7.0 02/09/2018    HGB 12.7 02/09/2018     02/09/2018     02/21/2012     BMP:    Lab Results   Component Value Date     02/09/2018    K 4.3 02/09/2018     02/09/2018    CO2 27 02/09/2018    BUN 14 02/09/2018    CREATININE 0.51 02/09/2018    GLUCOSE 109 02/09/2018    GLUCOSE 176 05/14/2012     Hemoglobin A1C:   Lab Results   Component Value Date    LABA1C 7.0 08/16/2017     Microalbumin Urine:   Lab Results   Component Value Date    MICROALBUR <12 04/27/2017     Lipid profile:   Lab Results   Component Value Date    CHOL 116 05/01/2017    TRIG 54 05/01/2017    HDL 53 05/01/2017     Thyroid functions:   Lab Results   Component Value Date    TSH 1.45 11/02/2016      Hepatic functions:   Lab Results   Component Value Date    ALT 21 02/09/2018    AST 23 02/09/2018    PROT 7.7 02/09/2018    BILITOT 0.25 02/09/2018    BILIDIR <0.08 08/11/2015    LABALBU 3.9 02/09/2018    LABALBU 4.3 05/14/2012     Urine Analysis: No results found for: [address] [place]:      Health Maintenance Due   Topic Date Due    DTaP/Tdap/Td vaccine (1 - Tdap) 10/26/1976    Diabetic retinal exam  01/01/2017    Colon Cancer Screen FIT/FOBT  08/19/2017    Cervical cancer screen  10/24/2017       ASSESSMENT AND PLAN:      1. Screening for colon cancer  POCT FECAL IMMUNOCHEMICAL TEST (FIT)   2. Obesity (BMI 30-39.9)     3.

## 2018-03-05 ENCOUNTER — INITIAL CONSULT (OUTPATIENT)
Dept: ENDOCRINOLOGY | Age: 63
End: 2018-03-05
Payer: MEDICARE

## 2018-03-05 VITALS
OXYGEN SATURATION: 97 % | WEIGHT: 272 LBS | SYSTOLIC BLOOD PRESSURE: 132 MMHG | DIASTOLIC BLOOD PRESSURE: 72 MMHG | HEIGHT: 64 IN | TEMPERATURE: 98.7 F | RESPIRATION RATE: 18 BRPM | HEART RATE: 70 BPM | BODY MASS INDEX: 46.44 KG/M2

## 2018-03-05 DIAGNOSIS — E04.2 MULTINODULAR GOITER: Primary | ICD-10-CM

## 2018-03-05 PROCEDURE — 99244 OFF/OP CNSLTJ NEW/EST MOD 40: CPT | Performed by: INTERNAL MEDICINE

## 2018-03-05 PROCEDURE — G8427 DOCREV CUR MEDS BY ELIG CLIN: HCPCS | Performed by: INTERNAL MEDICINE

## 2018-03-05 PROCEDURE — G8417 CALC BMI ABV UP PARAM F/U: HCPCS | Performed by: INTERNAL MEDICINE

## 2018-03-05 PROCEDURE — G8484 FLU IMMUNIZE NO ADMIN: HCPCS | Performed by: INTERNAL MEDICINE

## 2018-03-05 PROCEDURE — 3017F COLORECTAL CA SCREEN DOC REV: CPT | Performed by: INTERNAL MEDICINE

## 2018-03-05 PROCEDURE — 3014F SCREEN MAMMO DOC REV: CPT | Performed by: INTERNAL MEDICINE

## 2018-03-05 NOTE — PROGRESS NOTES
Dr. [facility]  [address] [facility] [phone]  [phone]    Chief Complaint   Patient presents with    Consultation     Pt referred by PCP Dr. Vy Fisher for multinodular goiter         /72 (Site: Right Arm, Position: Sitting, Cuff Size: Large Adult)   Pulse 70   Temp 98.7 °F (37.1 °C)   Resp 18   Ht 5' 4.02\" (1.626 m)   Wt 272 lb (123.4 kg)   SpO2 97%   BMI 46.67 kg/m²      Wt Readings from Last 3 Encounters:   03/05/18 272 lb (123.4 kg)   02/19/18 273 lb (123.8 kg)   02/09/18 275 lb (124.7 kg)     Body mass index is 46.67 kg/m². BP Readings from Last 3 Encounters:   03/05/18 132/72   02/19/18 128/83   02/09/18 (!) 150/75       Diabetes Management   Topic Date Due    Diabetic retinal exam  01/01/2017       HPI:       HPI     63YEAR OLD  FEMALE REFERRED TO ENDOCRINE SERVICE FOR EVALUATION AND MANAGEMENT OF MULTINODULAR GOITER. ACCORDING TO PATIENT SHE HAS  HAD GOITER FOR SEVERAL YEARS,  HAD FINE NEEDLE ASPIRATION BIOPSY IN PAST AND WAS BENIGN. SHE HAD A REPEAT THYROID ULTRASOUND ON 11/17/17 WHICH SHOWED THE RT . THYROID NODULE INCREASED IN SIZE COMPARED TO THE ULTRASOUND DONE NOV 2 , 2016    PATIENT DENIES ANY SYMPTOMS OF HYPO OR HYPER THYROIDISM  NO HEAT OR COLD INTOLERANCE. HAS OCCASIONAL PALPITATIONS. NO RECENT WEIGHT CHANGE. Fredirick Batters SHE HAS BEEN OBESE MOST OF HER LIFE. NO HX OF OF CONSTIPATION OR DIARRHEA. NO HX OF EXOPHTHALMOS    PATIENT DENIES PRESSURE SYMPTOMS IN NECK. NO DYSPHAGIA, CHOKING RESPIRATORY DISTRESS OR CHANGE IN VOICE. SHE COMPLAINS OF SOME SORENESS RT NECK OFF AND ON. PATIENT'S OTHER MEDICAL PROBLEMS INCLUDE  TYPE 2 DIABETES MELLITUS  FOR OVER 4 YEARS ON GLIPIZIDE 5 MG DAILY AND SAXAGLIPTIN ( ONGLYZA ) 5 MG DAILY. CHECKS SUGARS ONCE OR TWICE  A DAY    HYPERCHOLESTEROLEMIA ON LIPITOR 40 MG DAILY    HYPERTENSION  ON HYDRALAZINE 75 MG  4 TIMES DAILY.  HCTZ 50 MG DAILY, Bottle 0    clotrimazole-betamethasone (LOTRISONE) 1-0.05 % cream Apply topically 2 times daily. 1 Tube 0    Blood Pressure Monitoring (B-D ASSURE BPM/AUTO ARM CUFF) MISC Use daily for BP readings 1 each 0    amLODIPine (NORVASC) 5 MG tablet Take 5 mg by mouth daily      vitamin D (ERGOCALCIFEROL) 73139 units CAPS capsule Take 1 capsule by mouth once a week 4 capsule 5    saxagliptin (ONGLYZA) 5 MG TABS tablet Take 1 tablet by mouth daily 30 tablet 3    meclizine (ANTIVERT) 12.5 MG tablet Take 1 tablet by mouth 3 times daily as needed for Dizziness 20 tablet 0    glucose blood VI test strips (TRUETEST TEST) strip 1 each by In Vitro route daily Use daily in AM before meals. 100 each 5    Lancets MISC 1 each by Does not apply route daily Use 4 times daily Diagnisis:250.0  Diabetes Mellitus 100 each 11    Alcohol Swabs (ALCOHOL WIPES) 70 % PADS Use up to 4 times a day when testing blood sugars 100 each 5    Multiple Vitamins-Minerals (THERAPEUTIC MULTIVITAMIN-MINERALS) tablet Take 1 tablet by mouth daily 30 tablet 11     No current facility-administered medications for this visit. Allergies   Allergen Reactions    Amoxicillin     Pcn [Penicillins] Hives and Itching       Subjective:      Review of Systems     CONSTITUTIONAL: NO SIGNIFICANT WEIGHT CHANGE. MORBIDLY OBESE MOST OF HER LIFE. NO FATIGUE OR FEVER. Head: OCCASIONAL HEAD ACHES  Eyes: NO VISION CHANGES. HAS CATARACTS.  ENT: NO COMPLAINTS  Cardiovascular: HYPERTENSION. AND HYPERCHOLESTEROLEMIA AS NOTED IN HPI. NO CHEST PAIN OR ANGINA. OCCASIONAL PALPITATIONS. Peripheral Vascular: NO CLAUDICATION. NO LEG EDEMA  Respiratory: NO COUGH OR SHORTNESS OF BREATH. NO HX OF SLEEP APNEA. Jammie Riedel HX OF ASTHMA PRESENT STABLE. Gastro - Intetestinal: OCCASIONAL ACID REFLUX. BOWEL O.K. NO ABDOMINAL PAIN. Genito - Urinary: NO POLYURIA . NO DYSURIA. GYN : S/P PARTIAL HYSTERECTOMY. PATIENT IS POST MENOPAUSAL. HAS HOT FLASHES.   Rheumatological: HAS  ARTHRITIC PROVIDED HISTORY: THYROID CANCER       FINDINGS:   Right thyroid lobe:  6.9 x 4.2 x 4.7 cm       Left thyroid lobe:  5.2 x 2.7 x 2.7 cm       Isthmus:  1.3 cm       Thyroid Gland:  Thyroid gland demonstrates normal echotexture and vascularity.       Nodules: There are multiple nodules throughout both thyroid lobes.  The   largest nodule is a dominant mass in the right thyroid lobe measuring up to 5   cm.  This is heterogeneous with minimal cystic change and a few punctate   calcifications.  The largest nodule on the left measures up to 1.3 cm.       Cervical lymphadenopathy: No abnormal lymph nodes in the imaged portions of   the neck. NO REPORT ON FINE NEEDLE ASPIRATION OF THYROID AVAILABLE    IMPRESSION :    1. LARGE MULTINODULAR GOITER. INCREASE IN RT THYROID NODULE  REPORTED IN RECENT THYROID ULTRASOUND ON  11/17/17. PATIENT HAS NO SIGNIFICANT PRESSURE SYMPTOMS IN NECK AND IS EUTHYROID    2. HYPERTENSION ON MULTIPLE B.P MEDS    3. TYPE 2 DIABETES MELLITUS OM GLUCOTROL AND  ONGLYZA    4. HYPERCHOLESTEROLEMIA ON LIPITOR    5. MORBID OBESITY    6. HX OF ASTHMA    7. DEG, ARTHRITIS BOTH KNEES    8. POST MENOPAUSAL. S/P PARTIAL HYSTERECTOMY. HAS HOT FLASHES. 9. HX OF VIT D  DEFICIENCY    10. HX OF DEPRESSION STABLE ON CITALOPRAM            No diagnosis found. Plan:      1. ALL LAB AND THYROID ULTRASOUND REPORTS DISCUSSED. 2. GET PREVIOUS THYROID FINE NEEDLE ASPIRATION REPORT  IF DONE  3. SINCE THE RT THYROID NODULE HAS INCREASED IN SIZE WILL  GET FINE NEEDLE ASPIRATION AGAIN  4. SINCE PATIENT HAS NO PRESSURE SYMPTOMS AT THIS TIME AND IF THE FINE NEEDLE ASPIRATION  COMES BACK BENIGN WILL SUGGEST OBSERVATION. ALSO I HAVE DISCUSSED ABOUT SUBTOTAL THYROIDECTOMY ALSO WITH PATIENT. SHE PREFERS OBSERVATION. IF BIOPSY  REPORT IS ABNORMAL THEN WE HAVE TO CONSIDER SURGERY  5. CONTINUE ALL HER OTHER MEDS FOR Juan Alberto M.D. PATIENT IS NOT ON METFORMIN FOR HER DIABETES.  HX OF INTOLERANCE TO METFORMIN REPORTED IN 2012. 6. DIET AND EXERCISE AS TOLERATED. 7.. RETURN  2 MONTHS       No orders of the defined types were placed in this encounter. No orders of the defined types were placed in this encounter. No Follow-up on file. Visit date not found        Patient given educational materials - see patient instructions. Discussed use, benefit, and side effects of prescribed medications. All patient questions answered. Pt voiced understanding. Reviewed health maintenance. Instructed to continue current medications, diet and exercise. Patient agreed with treatment plan. Follow up as directed.      Electronically signed by Hortencia Norton MD on 3/5/18 at 5:12 PM

## 2018-03-05 NOTE — PROGRESS NOTES
Visit Information    Have you changed or started any medications since your last visit including any over-the-counter medicines, vitamins, or herbal medicines? no   Are you having any side effects from any of your medications? -  no  Have you stopped taking any of your medications? Is so, why? -  no    Have you seen any other physician or provider since your last visit? No  Have you had any other diagnostic tests since your last visit? No  Have you been seen in the emergency room and/or had an admission to a hospital since we last saw you? No  Have you had your routine dental cleaning in the past 6 months? no    Have you activated your [email or web address] account? If not, what are your barriers?  Yes     Patient Care Team:  Maxi Mayfield MD as PCP - General    Medical History Review  Past Medical, Family, and Social History reviewed and does contribute to the patient presenting condition    Health Maintenance   Topic Date Due    DTaP/Tdap/Td vaccine (1 - Tdap) 10/26/1976    Shingles Vaccine (1 of 2 - 2 Dose Series) 10/26/2007    Diabetic retinal exam  01/01/2017    Colon Cancer Screen FIT/FOBT  08/19/2017    Cervical cancer screen  10/24/2017    Hepatitis C screen  05/19/2018 (Originally 1955)    HIV screen  05/19/2018 (Originally 10/26/1972)    Diabetic foot exam  01/19/2019 (Originally 8/31/2016)    Diabetic microalbuminuria test  04/27/2018    Lipid screen  05/01/2018    A1C test (Diabetic or Prediabetic)  08/16/2018    Potassium monitoring  02/09/2019    Creatinine monitoring  02/09/2019    Breast cancer screen  03/28/2019    Flu vaccine  Completed    Pneumococcal med risk  Completed

## 2018-03-07 ENCOUNTER — TELEPHONE (OUTPATIENT)
Dept: ENDOCRINOLOGY | Age: 63
End: 2018-03-07

## 2018-03-07 DIAGNOSIS — Z13.31 POSITIVE DEPRESSION SCREENING: ICD-10-CM

## 2018-03-07 DIAGNOSIS — I10 ESSENTIAL HYPERTENSION: ICD-10-CM

## 2018-03-07 DIAGNOSIS — E11.9 TYPE 2 DIABETES MELLITUS WITHOUT COMPLICATION (HCC): ICD-10-CM

## 2018-03-07 DIAGNOSIS — E78.5 HYPERLIPIDEMIA WITH TARGET LDL LESS THAN 100: ICD-10-CM

## 2018-03-08 DIAGNOSIS — K21.9 GASTROESOPHAGEAL REFLUX DISEASE WITHOUT ESOPHAGITIS: ICD-10-CM

## 2018-03-08 RX ORDER — OMEPRAZOLE 20 MG/1
CAPSULE, DELAYED RELEASE ORAL
Qty: 60 CAPSULE | Refills: 3 | Status: SHIPPED | OUTPATIENT
Start: 2018-03-08 | End: 2018-12-21 | Stop reason: SDUPTHER

## 2018-03-08 RX ORDER — HYDRALAZINE HYDROCHLORIDE 50 MG/1
TABLET, FILM COATED ORAL
Qty: 120 TABLET | Refills: 0 | Status: SHIPPED | OUTPATIENT
Start: 2018-03-08 | End: 2018-04-02 | Stop reason: SDUPTHER

## 2018-03-08 RX ORDER — HYDRALAZINE HYDROCHLORIDE 25 MG/1
TABLET, FILM COATED ORAL
Qty: 120 TABLET | Refills: 0 | Status: SHIPPED | OUTPATIENT
Start: 2018-03-08 | End: 2018-04-02 | Stop reason: SDUPTHER

## 2018-03-08 RX ORDER — CITALOPRAM 20 MG/1
TABLET ORAL
Qty: 30 TABLET | Refills: 0 | Status: SHIPPED | OUTPATIENT
Start: 2018-03-08 | End: 2018-04-02 | Stop reason: SDUPTHER

## 2018-03-08 RX ORDER — ASPIRIN 81 MG
TABLET,CHEWABLE ORAL
Qty: 30 TABLET | Refills: 0 | Status: SHIPPED | OUTPATIENT
Start: 2018-03-08 | End: 2018-04-02 | Stop reason: SDUPTHER

## 2018-03-08 RX ORDER — HYDROCHLOROTHIAZIDE 50 MG/1
TABLET ORAL
Qty: 30 TABLET | Refills: 5 | Status: SHIPPED | OUTPATIENT
Start: 2018-03-08 | End: 2018-04-03

## 2018-03-08 RX ORDER — ATORVASTATIN CALCIUM 40 MG/1
TABLET, FILM COATED ORAL
Qty: 30 TABLET | Refills: 0 | Status: SHIPPED | OUTPATIENT
Start: 2018-03-08 | End: 2018-04-02 | Stop reason: SDUPTHER

## 2018-03-08 RX ORDER — GLIPIZIDE 5 MG/1
TABLET ORAL
Qty: 60 TABLET | Refills: 0 | Status: SHIPPED | OUTPATIENT
Start: 2018-03-08 | End: 2018-04-02 | Stop reason: SDUPTHER

## 2018-03-14 DIAGNOSIS — Z79.4 TYPE 2 DIABETES MELLITUS WITHOUT COMPLICATION, WITH LONG-TERM CURRENT USE OF INSULIN (HCC): ICD-10-CM

## 2018-03-14 DIAGNOSIS — E55.9 VITAMIN D DEFICIENCY DISEASE: ICD-10-CM

## 2018-03-14 DIAGNOSIS — E11.9 TYPE 2 DIABETES MELLITUS WITHOUT COMPLICATION, WITH LONG-TERM CURRENT USE OF INSULIN (HCC): ICD-10-CM

## 2018-03-14 RX ORDER — ISOPROPYL ALCOHOL 0.7 ML/1
SWAB TOPICAL
Qty: 100 EACH | Refills: 5 | Status: SHIPPED | OUTPATIENT
Start: 2018-03-14 | End: 2018-12-21 | Stop reason: SDUPTHER

## 2018-03-14 RX ORDER — ERGOCALCIFEROL 1.25 MG/1
50000 CAPSULE ORAL WEEKLY
Qty: 4 CAPSULE | Refills: 5 | Status: SHIPPED | OUTPATIENT
Start: 2018-03-14 | End: 2018-08-20 | Stop reason: SDUPTHER

## 2018-03-14 RX ORDER — LANCETS 30 GAUGE
1 EACH MISCELLANEOUS DAILY
Qty: 100 EACH | Refills: 11 | Status: SHIPPED | OUTPATIENT
Start: 2018-03-14 | End: 2018-12-21 | Stop reason: SDUPTHER

## 2018-03-20 ENCOUNTER — TELEPHONE (OUTPATIENT)
Dept: INTERNAL MEDICINE | Age: 63
End: 2018-03-20

## 2018-03-20 DIAGNOSIS — R42 DIZZINESS: Primary | ICD-10-CM

## 2018-04-02 DIAGNOSIS — E78.5 HYPERLIPIDEMIA WITH TARGET LDL LESS THAN 100: ICD-10-CM

## 2018-04-02 DIAGNOSIS — E11.9 TYPE 2 DIABETES MELLITUS WITHOUT COMPLICATION (HCC): ICD-10-CM

## 2018-04-02 DIAGNOSIS — Z13.31 POSITIVE DEPRESSION SCREENING: ICD-10-CM

## 2018-04-03 RX ORDER — CITALOPRAM 20 MG/1
TABLET ORAL
Qty: 30 TABLET | Refills: 2 | Status: SHIPPED | OUTPATIENT
Start: 2018-04-03 | End: 2018-06-22 | Stop reason: SDUPTHER

## 2018-04-03 RX ORDER — ASPIRIN 81 MG/1
TABLET, CHEWABLE ORAL
Qty: 30 TABLET | Refills: 3 | Status: SHIPPED | OUTPATIENT
Start: 2018-04-03 | End: 2018-07-20 | Stop reason: SDUPTHER

## 2018-04-03 RX ORDER — HYDRALAZINE HYDROCHLORIDE 25 MG/1
TABLET, FILM COATED ORAL
Qty: 120 TABLET | Refills: 2 | Status: SHIPPED | OUTPATIENT
Start: 2018-04-03 | End: 2018-06-22 | Stop reason: SDUPTHER

## 2018-04-03 RX ORDER — HYDRALAZINE HYDROCHLORIDE 50 MG/1
TABLET, FILM COATED ORAL
Qty: 120 TABLET | Refills: 3 | Status: SHIPPED | OUTPATIENT
Start: 2018-04-03 | End: 2018-04-05 | Stop reason: SDUPTHER

## 2018-04-03 RX ORDER — GLIPIZIDE 5 MG/1
TABLET ORAL
Qty: 60 TABLET | Refills: 3 | Status: SHIPPED | OUTPATIENT
Start: 2018-04-03 | End: 2018-07-20 | Stop reason: SDUPTHER

## 2018-04-03 RX ORDER — ATORVASTATIN CALCIUM 40 MG/1
TABLET, FILM COATED ORAL
Qty: 30 TABLET | Refills: 3 | Status: SHIPPED | OUTPATIENT
Start: 2018-04-03 | End: 2018-07-20 | Stop reason: SDUPTHER

## 2018-04-05 ENCOUNTER — OFFICE VISIT (OUTPATIENT)
Dept: INTERNAL MEDICINE | Age: 63
End: 2018-04-05
Payer: MEDICARE

## 2018-04-05 VITALS
HEART RATE: 92 BPM | DIASTOLIC BLOOD PRESSURE: 87 MMHG | OXYGEN SATURATION: 97 % | WEIGHT: 270 LBS | BODY MASS INDEX: 46.32 KG/M2 | SYSTOLIC BLOOD PRESSURE: 146 MMHG

## 2018-04-05 DIAGNOSIS — E11.9 TYPE 2 DIABETES MELLITUS WITHOUT COMPLICATION, WITH LONG-TERM CURRENT USE OF INSULIN (HCC): Primary | ICD-10-CM

## 2018-04-05 DIAGNOSIS — E66.9 OBESITY (BMI 30-39.9): ICD-10-CM

## 2018-04-05 DIAGNOSIS — M17.0 OSTEOARTHRITIS OF BOTH KNEES, UNSPECIFIED OSTEOARTHRITIS TYPE: ICD-10-CM

## 2018-04-05 DIAGNOSIS — H81.10 BENIGN PAROXYSMAL POSITIONAL VERTIGO, UNSPECIFIED LATERALITY: ICD-10-CM

## 2018-04-05 DIAGNOSIS — Z79.4 TYPE 2 DIABETES MELLITUS WITHOUT COMPLICATION, WITH LONG-TERM CURRENT USE OF INSULIN (HCC): Primary | ICD-10-CM

## 2018-04-05 LAB — HBA1C MFR BLD: 7.3 %

## 2018-04-05 PROCEDURE — 3017F COLORECTAL CA SCREEN DOC REV: CPT | Performed by: INTERNAL MEDICINE

## 2018-04-05 PROCEDURE — 3046F HEMOGLOBIN A1C LEVEL >9.0%: CPT | Performed by: INTERNAL MEDICINE

## 2018-04-05 PROCEDURE — G8427 DOCREV CUR MEDS BY ELIG CLIN: HCPCS | Performed by: INTERNAL MEDICINE

## 2018-04-05 PROCEDURE — 3014F SCREEN MAMMO DOC REV: CPT | Performed by: INTERNAL MEDICINE

## 2018-04-05 PROCEDURE — 99214 OFFICE O/P EST MOD 30 MIN: CPT | Performed by: INTERNAL MEDICINE

## 2018-04-05 PROCEDURE — G8417 CALC BMI ABV UP PARAM F/U: HCPCS | Performed by: INTERNAL MEDICINE

## 2018-04-05 PROCEDURE — 1036F TOBACCO NON-USER: CPT | Performed by: INTERNAL MEDICINE

## 2018-04-05 PROCEDURE — 83036 HEMOGLOBIN GLYCOSYLATED A1C: CPT | Performed by: INTERNAL MEDICINE

## 2018-04-05 RX ORDER — HYDROCHLOROTHIAZIDE 50 MG/1
50 TABLET ORAL DAILY
COMMUNITY
End: 2018-12-21 | Stop reason: SDUPTHER

## 2018-04-05 ASSESSMENT — ENCOUNTER SYMPTOMS
EYES NEGATIVE: 1
ALLERGIC/IMMUNOLOGIC NEGATIVE: 1
GASTROINTESTINAL NEGATIVE: 1
RESPIRATORY NEGATIVE: 1

## 2018-04-23 ENCOUNTER — OFFICE VISIT (OUTPATIENT)
Dept: INTERNAL MEDICINE | Age: 63
End: 2018-04-23
Payer: MEDICARE

## 2018-04-23 VITALS
BODY MASS INDEX: 46.92 KG/M2 | DIASTOLIC BLOOD PRESSURE: 81 MMHG | OXYGEN SATURATION: 98 % | WEIGHT: 274.8 LBS | SYSTOLIC BLOOD PRESSURE: 138 MMHG | HEART RATE: 83 BPM | RESPIRATION RATE: 12 BRPM | HEIGHT: 64 IN

## 2018-04-23 DIAGNOSIS — Z13.220 SCREENING FOR HYPERLIPIDEMIA: Primary | ICD-10-CM

## 2018-04-23 DIAGNOSIS — E11.9 TYPE 2 DIABETES MELLITUS WITHOUT COMPLICATION, WITH LONG-TERM CURRENT USE OF INSULIN (HCC): ICD-10-CM

## 2018-04-23 DIAGNOSIS — Z79.4 TYPE 2 DIABETES MELLITUS WITHOUT COMPLICATION, WITH LONG-TERM CURRENT USE OF INSULIN (HCC): ICD-10-CM

## 2018-04-23 DIAGNOSIS — Z23 NEED FOR PROPHYLACTIC VACCINATION AGAINST STREPTOCOCCUS PNEUMONIAE (PNEUMOCOCCUS): ICD-10-CM

## 2018-04-23 DIAGNOSIS — J30.1 ACUTE ALLERGIC RHINITIS DUE TO POLLEN, UNSPECIFIED SEASONALITY: ICD-10-CM

## 2018-04-23 DIAGNOSIS — J45.20 MILD INTERMITTENT ASTHMA WITHOUT COMPLICATION: ICD-10-CM

## 2018-04-23 PROCEDURE — 1036F TOBACCO NON-USER: CPT | Performed by: INTERNAL MEDICINE

## 2018-04-23 PROCEDURE — G8427 DOCREV CUR MEDS BY ELIG CLIN: HCPCS | Performed by: INTERNAL MEDICINE

## 2018-04-23 PROCEDURE — 90471 IMMUNIZATION ADMIN: CPT | Performed by: INTERNAL MEDICINE

## 2018-04-23 PROCEDURE — 3045F PR MOST RECENT HEMOGLOBIN A1C LEVEL 7.0-9.0%: CPT | Performed by: INTERNAL MEDICINE

## 2018-04-23 PROCEDURE — G8417 CALC BMI ABV UP PARAM F/U: HCPCS | Performed by: INTERNAL MEDICINE

## 2018-04-23 PROCEDURE — 99214 OFFICE O/P EST MOD 30 MIN: CPT | Performed by: INTERNAL MEDICINE

## 2018-04-23 PROCEDURE — 3017F COLORECTAL CA SCREEN DOC REV: CPT | Performed by: INTERNAL MEDICINE

## 2018-04-23 PROCEDURE — 90670 PCV13 VACCINE IM: CPT | Performed by: INTERNAL MEDICINE

## 2018-04-23 PROCEDURE — 2022F DILAT RTA XM EVC RTNOPTHY: CPT | Performed by: INTERNAL MEDICINE

## 2018-04-23 RX ORDER — LORATADINE 10 MG/1
10 TABLET ORAL DAILY
Qty: 30 TABLET | Refills: 0 | Status: SHIPPED | OUTPATIENT
Start: 2018-04-23 | End: 2018-10-02

## 2018-04-23 ASSESSMENT — ENCOUNTER SYMPTOMS
EYES NEGATIVE: 1
GASTROINTESTINAL NEGATIVE: 1
RESPIRATORY NEGATIVE: 1
ALLERGIC/IMMUNOLOGIC NEGATIVE: 1

## 2018-04-25 ENCOUNTER — HOSPITAL ENCOUNTER (OUTPATIENT)
Dept: PHYSICAL THERAPY | Age: 63
Setting detail: THERAPIES SERIES
Discharge: HOME | End: 2018-04-25
Payer: MEDICARE

## 2018-04-25 DIAGNOSIS — I10 ESSENTIAL HYPERTENSION: ICD-10-CM

## 2018-04-25 PROCEDURE — 97110 THERAPEUTIC EXERCISES: CPT

## 2018-04-25 PROCEDURE — G8978 MOBILITY CURRENT STATUS: HCPCS

## 2018-04-25 PROCEDURE — G8979 MOBILITY GOAL STATUS: HCPCS

## 2018-04-25 PROCEDURE — 97161 PT EVAL LOW COMPLEX 20 MIN: CPT

## 2018-04-25 RX ORDER — LOSARTAN POTASSIUM 100 MG/1
TABLET ORAL
Qty: 28 TABLET | Refills: 3 | Status: SHIPPED | OUTPATIENT
Start: 2018-04-25 | End: 2018-08-15 | Stop reason: SDUPTHER

## 2018-04-27 ENCOUNTER — HOSPITAL ENCOUNTER (OUTPATIENT)
Dept: PHYSICAL THERAPY | Age: 63
Setting detail: THERAPIES SERIES
Discharge: HOME | End: 2018-04-27
Payer: MEDICARE

## 2018-04-27 PROCEDURE — 97110 THERAPEUTIC EXERCISES: CPT

## 2018-05-02 ENCOUNTER — HOSPITAL ENCOUNTER (OUTPATIENT)
Dept: PHYSICAL THERAPY | Age: 63
Setting detail: THERAPIES SERIES
Discharge: HOME | End: 2018-05-02
Payer: MEDICARE

## 2018-05-02 PROCEDURE — 97110 THERAPEUTIC EXERCISES: CPT

## 2018-05-10 ENCOUNTER — OFFICE VISIT (OUTPATIENT)
Dept: NEUROLOGY | Age: 63
End: 2018-05-10
Payer: MEDICARE

## 2018-05-10 VITALS
WEIGHT: 273.2 LBS | HEIGHT: 64 IN | SYSTOLIC BLOOD PRESSURE: 141 MMHG | DIASTOLIC BLOOD PRESSURE: 88 MMHG | HEART RATE: 97 BPM | BODY MASS INDEX: 46.64 KG/M2

## 2018-05-10 DIAGNOSIS — G60.8 SENSORY POLYNEUROPATHY: ICD-10-CM

## 2018-05-10 DIAGNOSIS — H81.10 BENIGN PAROXYSMAL POSITIONAL VERTIGO, UNSPECIFIED LATERALITY: Primary | ICD-10-CM

## 2018-05-10 PROCEDURE — G8417 CALC BMI ABV UP PARAM F/U: HCPCS | Performed by: PSYCHIATRY & NEUROLOGY

## 2018-05-10 PROCEDURE — 99214 OFFICE O/P EST MOD 30 MIN: CPT | Performed by: PSYCHIATRY & NEUROLOGY

## 2018-05-10 PROCEDURE — 1036F TOBACCO NON-USER: CPT | Performed by: PSYCHIATRY & NEUROLOGY

## 2018-05-10 PROCEDURE — 3017F COLORECTAL CA SCREEN DOC REV: CPT | Performed by: PSYCHIATRY & NEUROLOGY

## 2018-05-10 PROCEDURE — G8427 DOCREV CUR MEDS BY ELIG CLIN: HCPCS | Performed by: PSYCHIATRY & NEUROLOGY

## 2018-05-10 RX ORDER — MECLIZINE HYDROCHLORIDE CHEWABLE TABLETS 25 MG/1
25 TABLET, CHEWABLE ORAL 3 TIMES DAILY PRN
Qty: 90 TABLET | Refills: 1 | Status: SHIPPED | OUTPATIENT
Start: 2018-05-10 | End: 2018-06-07 | Stop reason: SDUPTHER

## 2018-05-11 ENCOUNTER — HOSPITAL ENCOUNTER (OUTPATIENT)
Dept: PHYSICAL THERAPY | Age: 63
Setting detail: THERAPIES SERIES
Discharge: HOME | End: 2018-05-11
Payer: MEDICARE

## 2018-05-23 ENCOUNTER — OFFICE VISIT (OUTPATIENT)
Dept: ENDOCRINOLOGY | Age: 63
End: 2018-05-23
Payer: MEDICARE

## 2018-05-23 VITALS
HEIGHT: 64 IN | OXYGEN SATURATION: 98 % | HEART RATE: 83 BPM | RESPIRATION RATE: 18 BRPM | SYSTOLIC BLOOD PRESSURE: 128 MMHG | TEMPERATURE: 99.2 F | WEIGHT: 270 LBS | DIASTOLIC BLOOD PRESSURE: 80 MMHG | BODY MASS INDEX: 46.1 KG/M2

## 2018-05-23 DIAGNOSIS — E04.2 MULTINODULAR GOITER: Primary | ICD-10-CM

## 2018-05-23 PROCEDURE — G8417 CALC BMI ABV UP PARAM F/U: HCPCS | Performed by: INTERNAL MEDICINE

## 2018-05-23 PROCEDURE — 1036F TOBACCO NON-USER: CPT | Performed by: INTERNAL MEDICINE

## 2018-05-23 PROCEDURE — G8427 DOCREV CUR MEDS BY ELIG CLIN: HCPCS | Performed by: INTERNAL MEDICINE

## 2018-05-23 PROCEDURE — 3017F COLORECTAL CA SCREEN DOC REV: CPT | Performed by: INTERNAL MEDICINE

## 2018-05-23 PROCEDURE — 99213 OFFICE O/P EST LOW 20 MIN: CPT | Performed by: INTERNAL MEDICINE

## 2018-05-30 ENCOUNTER — HOSPITAL ENCOUNTER (OUTPATIENT)
Dept: PHYSICAL THERAPY | Age: 63
Setting detail: THERAPIES SERIES
Discharge: HOME | End: 2018-05-30
Payer: MEDICARE

## 2018-05-30 PROCEDURE — 97110 THERAPEUTIC EXERCISES: CPT

## 2018-06-07 ENCOUNTER — APPOINTMENT (OUTPATIENT)
Dept: GENERAL RADIOLOGY | Age: 63
End: 2018-06-07
Payer: MEDICARE

## 2018-06-07 ENCOUNTER — HOSPITAL ENCOUNTER (EMERGENCY)
Age: 63
Discharge: HOME | End: 2018-06-07
Attending: EMERGENCY MEDICINE
Payer: MEDICARE

## 2018-06-07 VITALS
OXYGEN SATURATION: 98 % | DIASTOLIC BLOOD PRESSURE: 69 MMHG | SYSTOLIC BLOOD PRESSURE: 138 MMHG | HEART RATE: 74 BPM | RESPIRATION RATE: 18 BRPM | TEMPERATURE: 97.9 F

## 2018-06-07 DIAGNOSIS — R42 LIGHTHEADEDNESS: Primary | ICD-10-CM

## 2018-06-07 LAB
ABSOLUTE EOS #: 0.3 K/UL (ref 0–0.44)
ABSOLUTE IMMATURE GRANULOCYTE: <0.03 K/UL (ref 0–0.3)
ABSOLUTE LYMPH #: 1.48 K/UL (ref 1.1–3.7)
ABSOLUTE MONO #: 0.54 K/UL (ref 0.1–1.2)
ANION GAP SERPL CALCULATED.3IONS-SCNC: 10 MMOL/L (ref 9–17)
BASOPHILS # BLD: 1 % (ref 0–2)
BASOPHILS ABSOLUTE: 0.04 K/UL (ref 0–0.2)
BILIRUBIN URINE: NEGATIVE
BUN BLDV-MCNC: 11 MG/DL (ref 8–23)
BUN/CREAT BLD: ABNORMAL (ref 9–20)
CALCIUM SERPL-MCNC: 8.8 MG/DL (ref 8.6–10.4)
CHLORIDE BLD-SCNC: 104 MMOL/L (ref 98–107)
CO2: 27 MMOL/L (ref 20–31)
COLOR: YELLOW
COMMENT UA: NORMAL
CREAT SERPL-MCNC: 0.54 MG/DL (ref 0.5–0.9)
DIFFERENTIAL TYPE: ABNORMAL
EKG ATRIAL RATE: 81 BPM
EKG P AXIS: 33 DEGREES
EKG P-R INTERVAL: 172 MS
EKG Q-T INTERVAL: 402 MS
EKG QRS DURATION: 92 MS
EKG QTC CALCULATION (BAZETT): 466 MS
EKG R AXIS: -24 DEGREES
EKG T AXIS: 9 DEGREES
EKG VENTRICULAR RATE: 81 BPM
EOSINOPHILS RELATIVE PERCENT: 5 % (ref 1–4)
GFR AFRICAN AMERICAN: >60 ML/MIN
GFR NON-AFRICAN AMERICAN: >60 ML/MIN
GFR SERPL CREATININE-BSD FRML MDRD: ABNORMAL ML/MIN/{1.73_M2}
GFR SERPL CREATININE-BSD FRML MDRD: ABNORMAL ML/MIN/{1.73_M2}
GLUCOSE BLD-MCNC: 210 MG/DL (ref 70–99)
GLUCOSE BLD-MCNC: 224 MG/DL (ref 65–105)
GLUCOSE URINE: NEGATIVE
HCT VFR BLD CALC: 37.6 % (ref 36.3–47.1)
HEMOGLOBIN: 12.4 G/DL (ref 11.9–15.1)
IMMATURE GRANULOCYTES: 0 %
KETONES, URINE: NEGATIVE
LEUKOCYTE ESTERASE, URINE: NEGATIVE
LYMPHOCYTES # BLD: 27 % (ref 24–43)
MCH RBC QN AUTO: 27 PG (ref 25.2–33.5)
MCHC RBC AUTO-ENTMCNC: 33 G/DL (ref 28.4–34.8)
MCV RBC AUTO: 81.7 FL (ref 82.6–102.9)
MONOCYTES # BLD: 10 % (ref 3–12)
NITRITE, URINE: NEGATIVE
NRBC AUTOMATED: 0 PER 100 WBC
PDW BLD-RTO: 17.7 % (ref 11.8–14.4)
PH UA: 7 (ref 5–8)
PLATELET # BLD: 230 K/UL (ref 138–453)
PLATELET ESTIMATE: ABNORMAL
PMV BLD AUTO: 11.8 FL (ref 8.1–13.5)
POTASSIUM SERPL-SCNC: 4.1 MMOL/L (ref 3.7–5.3)
PROTEIN UA: NEGATIVE
RBC # BLD: 4.6 M/UL (ref 3.95–5.11)
RBC # BLD: ABNORMAL 10*6/UL
SEG NEUTROPHILS: 57 % (ref 36–65)
SEGMENTED NEUTROPHILS ABSOLUTE COUNT: 3.14 K/UL (ref 1.5–8.1)
SODIUM BLD-SCNC: 141 MMOL/L (ref 135–144)
SPECIFIC GRAVITY UA: 1.01 (ref 1–1.03)
TROPONIN INTERP: NORMAL
TROPONIN INTERP: NORMAL
TROPONIN T: <0.03 NG/ML
TROPONIN T: <0.03 NG/ML
TSH SERPL DL<=0.05 MIU/L-ACNC: 1.51 MIU/L (ref 0.3–5)
TURBIDITY: CLEAR
URINE HGB: NEGATIVE
UROBILINOGEN, URINE: NORMAL
WBC # BLD: 5.5 K/UL (ref 3.5–11.3)
WBC # BLD: ABNORMAL 10*3/UL

## 2018-06-07 PROCEDURE — 81003 URINALYSIS AUTO W/O SCOPE: CPT

## 2018-06-07 PROCEDURE — 84443 ASSAY THYROID STIM HORMONE: CPT

## 2018-06-07 PROCEDURE — 6360000002 HC RX W HCPCS: Performed by: NURSE PRACTITIONER

## 2018-06-07 PROCEDURE — 80048 BASIC METABOLIC PNL TOTAL CA: CPT

## 2018-06-07 PROCEDURE — 82947 ASSAY GLUCOSE BLOOD QUANT: CPT

## 2018-06-07 PROCEDURE — 6370000000 HC RX 637 (ALT 250 FOR IP): Performed by: NURSE PRACTITIONER

## 2018-06-07 PROCEDURE — 84484 ASSAY OF TROPONIN QUANT: CPT

## 2018-06-07 PROCEDURE — 99284 EMERGENCY DEPT VISIT MOD MDM: CPT

## 2018-06-07 PROCEDURE — 93005 ELECTROCARDIOGRAM TRACING: CPT

## 2018-06-07 PROCEDURE — 71046 X-RAY EXAM CHEST 2 VIEWS: CPT

## 2018-06-07 PROCEDURE — 85025 COMPLETE CBC W/AUTO DIFF WBC: CPT

## 2018-06-07 RX ORDER — MECLIZINE HCL 12.5 MG/1
25 TABLET ORAL ONCE
Status: COMPLETED | OUTPATIENT
Start: 2018-06-07 | End: 2018-06-07

## 2018-06-07 RX ORDER — ONDANSETRON 4 MG/1
4 TABLET, FILM COATED ORAL ONCE
Status: COMPLETED | OUTPATIENT
Start: 2018-06-07 | End: 2018-06-07

## 2018-06-07 RX ORDER — MECLIZINE HYDROCHLORIDE 25 MG/1
25 TABLET ORAL 3 TIMES DAILY PRN
Qty: 15 TABLET | Refills: 0 | Status: SHIPPED | OUTPATIENT
Start: 2018-06-07 | End: 2018-06-17

## 2018-06-07 RX ADMIN — MECLIZINE 25 MG: 12.5 TABLET ORAL at 11:29

## 2018-06-07 RX ADMIN — ONDANSETRON HYDROCHLORIDE 4 MG: 4 TABLET, FILM COATED ORAL at 11:29

## 2018-06-07 ASSESSMENT — ENCOUNTER SYMPTOMS
VOMITING: 0
DIARRHEA: 0
SHORTNESS OF BREATH: 0
NAUSEA: 1
CONSTIPATION: 0
BACK PAIN: 0
ABDOMINAL PAIN: 0

## 2018-06-08 ENCOUNTER — HOSPITAL ENCOUNTER (OUTPATIENT)
Dept: PHYSICAL THERAPY | Age: 63
Setting detail: THERAPIES SERIES
Discharge: HOME | End: 2018-06-08
Payer: MEDICARE

## 2018-06-08 PROCEDURE — 97110 THERAPEUTIC EXERCISES: CPT

## 2018-06-13 ENCOUNTER — TELEPHONE (OUTPATIENT)
Dept: INTERVENTIONAL RADIOLOGY/VASCULAR | Age: 63
End: 2018-06-13

## 2018-06-22 DIAGNOSIS — Z13.31 POSITIVE DEPRESSION SCREENING: ICD-10-CM

## 2018-06-25 RX ORDER — CITALOPRAM 20 MG/1
TABLET ORAL
Qty: 30 TABLET | Refills: 2 | Status: SHIPPED | OUTPATIENT
Start: 2018-06-25 | End: 2018-09-14 | Stop reason: SDUPTHER

## 2018-06-25 RX ORDER — METOPROLOL TARTRATE 50 MG/1
TABLET, FILM COATED ORAL
Qty: 90 TABLET | Refills: 4 | Status: SHIPPED | OUTPATIENT
Start: 2018-06-25 | End: 2018-12-21 | Stop reason: SDUPTHER

## 2018-06-25 RX ORDER — HYDRALAZINE HYDROCHLORIDE 25 MG/1
TABLET, FILM COATED ORAL
Qty: 120 TABLET | Refills: 2 | Status: SHIPPED | OUTPATIENT
Start: 2018-06-25 | End: 2018-09-14 | Stop reason: SDUPTHER

## 2018-07-06 ENCOUNTER — HOSPITAL ENCOUNTER (OUTPATIENT)
Dept: ULTRASOUND IMAGING | Age: 63
Discharge: HOME | End: 2018-07-08
Payer: MEDICARE

## 2018-07-06 DIAGNOSIS — E04.2 MULTINODULAR GOITER: ICD-10-CM

## 2018-07-06 PROCEDURE — 88172 CYTP DX EVAL FNA 1ST EA SITE: CPT

## 2018-07-06 PROCEDURE — 88173 CYTOPATH EVAL FNA REPORT: CPT

## 2018-07-06 PROCEDURE — 76942 ECHO GUIDE FOR BIOPSY: CPT

## 2018-07-06 PROCEDURE — 88305 TISSUE EXAM BY PATHOLOGIST: CPT

## 2018-07-06 PROCEDURE — 10022 US FINE NEEDLE ASPIRATION: CPT

## 2018-07-09 ENCOUNTER — HOSPITAL ENCOUNTER (OUTPATIENT)
Dept: PHYSICAL THERAPY | Age: 63
Setting detail: THERAPIES SERIES
Discharge: HOME | End: 2018-07-09
Payer: MEDICARE

## 2018-07-09 LAB — SURGICAL PATHOLOGY REPORT: NORMAL

## 2018-07-09 PROCEDURE — 97110 THERAPEUTIC EXERCISES: CPT

## 2018-07-09 NOTE — FLOWSHEET NOTE
[x] [facility]       Outpatient Physical        Therapy       [address]       Phone: [phone]       Fax: [phone]     Physical Therapy Daily Treatment Note    Date:  2018  Patient Name:  Guerrero Figueredo    :  1955  MRN: [id number]  Physician: Kristina Montiel MD    Insurance: [employer]  Diagnosis: Osteoarthritis of both knees; unspecified osteoarthritis type (M17.0)        Onset Date: ~10/2017   Next Dr. Sean Liu: to be determined  Visit# / total visits: 5/10  Cancels/No Shows: 0/0    Subjective:    Pain:  [x] Yes  [] No Location: knees     Pain Rating: (0-10 scale) 6/10 left knee, 6/10 right knee  Pain altered Tx:  [x] No  [] Yes  Action:  Comments: Reports knees are hurting after walking into clinic this AM. Did not take pain medication due to rushing to get here today. Rates jennifer knees at 6/10 but states left is worse. Addressing HEP on regular basis.       Objective: patient has decreased hearing - especially in the left ear  Modalities: following exercises, patient was positioned in supine with her knees elevated on (purple) wedge and head up 45 ° with two pillows and a small hot pack applied to the anterior knee on each side for 10 minutes HELD THIS DATE-PT DECLINED  Precautions: patient is hard of hearing  Exercises:  Exercise  Bilateral knees Reps/ Time Weight/ Level Comments   supine      Quad set bilateral 10 reps each 5 sec hold Reviewed with patient   Hip IR/ER stretch 5x 10 sec hold ADDED 18   Short arc quad bilateral with (yellow) ball for VMO 10 reps each  Reviewed with patient   Heel slides right 10 reps each  Reviewed with patient; verbal cues    Heel slides left 10 reps each  Reviewed with patient; minimal verbal cues   SLR 5x reps each  Reviewed with patient;  Verbal cues   Patellar mobs bilateral 5min  Therapist, difficulty with sup-inf glides   Bridges 10x 3 sec hold ADDED 18   S/L Hip ABD 10x each A ADDED 18   sitting   bilateral   Ankle DF/PF 15x     Heel slides for flexion and extension bilatreal 10 reps each  Reviewed with patient; verbal cues and demo prior to first repetition   Hamstring stretch with heel prop on stool 3 reps each 20 sec hold Reviewed with patient; Long arc quad with pillow between knees 10 reps AROM  Reviewed with patient; verbal cues   Other:  Pt required continual verbal cues and demos. Slow movements throughout session. Specific Instructions for next treatment:begin closed chain exercises, instruct pt how to perform patellar glides at home    Treatment Charges: Mins Units   []  Modalities   HP    0   [x]  Ther Exercise 51 3   []  Manual Therapy     []  Ther Activities     []  Aquatics     []  Vasocompression     []  Other     Total Treatment time 51 3       Assessment: [x] Progressing toward goals. Pt tolerated exercises fairly today with some verbal/tactile cues for proper technique. Demonstrated minimal movement with patellar mobs. Therapist lowered pt's cane to appropriate height and she verbalized/demonstrated that it improved her ambulation after a 2x10ft trial walk. Continued need for skilled therapy to incr strength, incr ROM, decr pain, and educate pt on HEP. [] No change. [] Other:    STG: (to be met in 10 treatments)  1. ? Pain:bilateral knees will be 5/10 at rest (progress made)  2. ? ROM:0-120 ° bilateral active knee range of motion (progress made - full extension bilateral knees)  3. ? Strength:VMO on  the left 4-/5 and patient will have no extensor lag with straight leg raise (progress made)  4. ? Function:patient will ambulate with the least restrictive device and minimal to no gait deviation  5. Independent with Home Exercise Programs  6. Demonstrate Knowledge of joint protection  7.  Patient will have a Lower extremity Functional scale score of 40% or less    Patient goals:to improve my walking and have less pain walking around ; 6/8/2018 patient states that she would like to get out and walk at the

## 2018-07-10 ENCOUNTER — APPOINTMENT (OUTPATIENT)
Dept: PHYSICAL THERAPY | Age: 63
End: 2018-07-10
Payer: MEDICARE

## 2018-07-11 ENCOUNTER — HOSPITAL ENCOUNTER (OUTPATIENT)
Dept: PHYSICAL THERAPY | Age: 63
Setting detail: THERAPIES SERIES
Discharge: HOME | End: 2018-07-11
Payer: MEDICARE

## 2018-07-11 PROCEDURE — 97530 THERAPEUTIC ACTIVITIES: CPT

## 2018-07-11 NOTE — FLOWSHEET NOTE
that the weather is good    Pt. Education:  [x] Yes  [] No  [x] Reviewed Prior home exercise program  Method of Education: [x] Verbal  [x] Demo  [x] Written  Comprehension of Education:  [x] Verbalizes understanding. [x] Demonstrates understanding. [] Needs review. [x] Demonstrates/verbalizes home exercise program/education/instructions previously given. 7/9/18: Hip IR/ER stretch, quad set, SLR, Hip ABD. Instructed pt to not lean on cane and to replace the bottom rubber tip for safety since it is sliding upon contact with the floor. 7/11/18: Instructed to change positions, stretch, LAQ, Ankle circles for long bus trip coming up. HP for pain at home. Plan: [x] Continue per plan of care. [] Other:   Frequency:  2 x/week for 10 visits, may decrease to once weekly when patient becomes proficient in her home exercise program       Time In: 1050      Time Out:  8015    Electronically signed by: Tori Rangel    Patient treated and note written by Tori Rangel, Student Physical Therapist Assistant under supervision of Darryn Arevalo PTA.

## 2018-07-13 ENCOUNTER — TELEPHONE (OUTPATIENT)
Dept: INTERNAL MEDICINE CLINIC | Age: 63
End: 2018-07-13

## 2018-07-18 ENCOUNTER — HOSPITAL ENCOUNTER (OUTPATIENT)
Dept: PHYSICAL THERAPY | Age: 63
Setting detail: THERAPIES SERIES
Discharge: HOME | End: 2018-07-18
Payer: MEDICARE

## 2018-07-18 ENCOUNTER — APPOINTMENT (OUTPATIENT)
Dept: PHYSICAL THERAPY | Age: 63
End: 2018-07-18
Payer: MEDICARE

## 2018-07-18 PROCEDURE — 97110 THERAPEUTIC EXERCISES: CPT

## 2018-07-20 ENCOUNTER — APPOINTMENT (OUTPATIENT)
Dept: PHYSICAL THERAPY | Age: 63
End: 2018-07-20
Payer: MEDICARE

## 2018-07-20 DIAGNOSIS — E11.9 TYPE 2 DIABETES MELLITUS WITHOUT COMPLICATION (HCC): ICD-10-CM

## 2018-07-20 DIAGNOSIS — E78.5 HYPERLIPIDEMIA WITH TARGET LDL LESS THAN 100: ICD-10-CM

## 2018-07-20 NOTE — TELEPHONE ENCOUNTER
Next Visit Date:  Future Appointments  Date Time Provider [place]   7/31/2018 1:15 PM Sherwin Guy MD [facility]   8/1/2018 10:00 AM [place] P[place] [facility]   8/6/2018 10:00 AM Ina Berg PTA [facility]   8/15/2018 1:00 PM Viry Dorado MD [facility] [place]   8/20/2018 2:00 PM Michelle Abernathy MD [facility] [address] Maintenance   Topic Date Due    Hepatitis C screen  1955    HIV screen  10/26/1972    DTaP/Tdap/Td vaccine (1 - Tdap) 10/26/1976    Diabetic retinal exam  01/01/2017    Colon Cancer Screen FIT/FOBT  08/19/2017    Diabetic microalbuminuria test  04/27/2018    Lipid screen  05/01/2018    Shingles Vaccine (1 of 2 - 2 Dose Series) 10/05/2018 (Originally 10/26/2007)    Cervical cancer screen  10/23/2018 (Originally 10/24/2017)    Diabetic foot exam  01/19/2019 (Originally 8/31/2016)    Flu vaccine (1) 09/01/2018    Breast cancer screen  03/28/2019    A1C test (Diabetic or Prediabetic)  04/05/2019    Potassium monitoring  06/07/2019    Creatinine monitoring  06/07/2019    Pneumococcal med risk  Completed       Hemoglobin A1C (%)   Date Value   04/05/2018 7.3   08/16/2017 7.0   04/27/2017 6.8             ( goal A1C is < 7)   Microalb/Crt.  Ratio (mcg/mg creat)   Date Value   04/27/2017 12     LDL Cholesterol (mg/dL)   Date Value   05/01/2017 52   01/15/2016 62       (goal LDL is <100)   AST (U/L)   Date Value   02/09/2018 23     ALT (U/L)   Date Value   02/09/2018 21     BUN (mg/dL)   Date Value   06/07/2018 11     BP Readings from Last 3 Encounters:   06/07/18 138/69   05/23/18 128/80   05/10/18 (!) 141/88          (goal 120/80)    All Future Testing planned in [facility]  Lab Frequency Next Occurrence   TSH with Reflex Once 09/28/2018   POCT FECAL IMMUNOCHEMICAL TEST (FIT) Once 01/20/2019   PT eval and treat Once 02/19/2018   T4, Free Once 03/05/2019   TSH Once 03/05/2019   Microalbumin, Ur Once 05/23/2018   Lipid, Fasting

## 2018-07-23 RX ORDER — GLIPIZIDE 5 MG/1
TABLET ORAL
Qty: 60 TABLET | Refills: 3 | Status: SHIPPED | OUTPATIENT
Start: 2018-07-23 | End: 2018-11-12 | Stop reason: SDUPTHER

## 2018-07-23 RX ORDER — ATORVASTATIN CALCIUM 40 MG/1
TABLET, FILM COATED ORAL
Qty: 30 TABLET | Refills: 3 | Status: SHIPPED | OUTPATIENT
Start: 2018-07-23 | End: 2018-11-12 | Stop reason: SDUPTHER

## 2018-07-23 RX ORDER — ASPIRIN 81 MG/1
TABLET, CHEWABLE ORAL
Qty: 30 TABLET | Refills: 3 | Status: SHIPPED | OUTPATIENT
Start: 2018-07-23 | End: 2018-11-12 | Stop reason: SDUPTHER

## 2018-07-23 RX ORDER — HYDRALAZINE HYDROCHLORIDE 50 MG/1
TABLET, FILM COATED ORAL
Qty: 120 TABLET | Refills: 3 | Status: SHIPPED | OUTPATIENT
Start: 2018-07-23 | End: 2018-09-14

## 2018-07-31 ENCOUNTER — OFFICE VISIT (OUTPATIENT)
Dept: ENDOCRINOLOGY | Age: 63
End: 2018-07-31
Payer: MEDICARE

## 2018-07-31 VITALS
HEIGHT: 64 IN | BODY MASS INDEX: 46.26 KG/M2 | SYSTOLIC BLOOD PRESSURE: 138 MMHG | HEART RATE: 106 BPM | OXYGEN SATURATION: 97 % | WEIGHT: 271 LBS | DIASTOLIC BLOOD PRESSURE: 60 MMHG | TEMPERATURE: 98.6 F

## 2018-07-31 DIAGNOSIS — E04.2 MULTINODULAR GOITER: Primary | ICD-10-CM

## 2018-07-31 PROCEDURE — 3017F COLORECTAL CA SCREEN DOC REV: CPT | Performed by: INTERNAL MEDICINE

## 2018-07-31 PROCEDURE — G8417 CALC BMI ABV UP PARAM F/U: HCPCS | Performed by: INTERNAL MEDICINE

## 2018-07-31 PROCEDURE — G8427 DOCREV CUR MEDS BY ELIG CLIN: HCPCS | Performed by: INTERNAL MEDICINE

## 2018-07-31 PROCEDURE — 1036F TOBACCO NON-USER: CPT | Performed by: INTERNAL MEDICINE

## 2018-07-31 PROCEDURE — 99213 OFFICE O/P EST LOW 20 MIN: CPT | Performed by: INTERNAL MEDICINE

## 2018-07-31 NOTE — PROGRESS NOTES
10/24/2017)    Diabetic foot exam  01/19/2019 (Originally 8/31/2016)    Flu vaccine (1) 09/01/2018    Breast cancer screen  03/28/2019    A1C test (Diabetic or Prediabetic)  04/05/2019    Potassium monitoring  06/07/2019    Creatinine monitoring  06/07/2019    Pneumococcal med risk  Completed

## 2018-07-31 NOTE — PROGRESS NOTES
sugars 100 each 5    omeprazole (PRILOSEC) 20 MG delayed release capsule TAKE ONE CAPSULE BY MOUTH TWICE A DAY 60 capsule 3    acetaminophen (TYLENOL) 325 MG tablet Take 2 tablets by mouth every 6 hours as needed for Pain or Fever 40 tablet 0    saline nasal gel (AYR) GEL Use intranasally, in both nostrils, daily as needed for sinus irritation. 14.1 g 0    benzocaine-menthol (CEPACOL SORE THROAT) 15-3.6 MG lozenge Take 1 lozenge by mouth every 2 hours as needed for Sore Throat 40 lozenge 0    fluticasone (FLONASE) 50 MCG/ACT nasal spray 1 spray by Nasal route daily 1 Bottle 0    albuterol sulfate  (90 Base) MCG/ACT inhaler Inhale 2 puffs into the lungs every 6 hours as needed for Wheezing 1 Inhaler 3    nystatin (MYCOSTATIN) 911162 UNIT/GM powder Apply topically 4 times daily. 1 Bottle 0    clotrimazole-betamethasone (LOTRISONE) 1-0.05 % cream Apply topically 2 times daily. 1 Tube 0    Blood Pressure Monitoring (B-D ASSURE BPM/AUTO ARM CUFF) MISC Use daily for BP readings 1 each 0    amLODIPine (NORVASC) 5 MG tablet Take 5 mg by mouth daily      saxagliptin (ONGLYZA) 5 MG TABS tablet Take 1 tablet by mouth daily 30 tablet 3    glucose blood VI test strips (TRUETEST TEST) strip 1 each by In Vitro route daily Use daily in AM before meals. 100 each 5    Multiple Vitamins-Minerals (THERAPEUTIC MULTIVITAMIN-MINERALS) tablet Take 1 tablet by mouth daily 30 tablet 11    metoprolol tartrate (LOPRESSOR) 50 MG tablet TAKE 1 & 1/2 TABLETS BY MOUTH TWICE DAILY 90 tablet 4     No current facility-administered medications for this visit. Allergies   Allergen Reactions    Amoxicillin     Pcn [Penicillins] Hives and Itching       Subjective:      Review of Systems  AS NOTED IN HPI       Objective:      Physical Exam     63YEAR OLD  FEMALE MORBIDLY OBESE IN NO DISTRESS  VITALS REVIEWED. Eyes: CARLOS.   ENT: THROAT CLEAR. Niki Rotunda HEARING DECREASED. Anne Angle. FEELS MULTINODULAR. Glucose 70 - 99 mg/dL 210     BUN 8 - 23 mg/dL 11    CREATININE 0.50 - 0.90 mg/dL 0.54    Bun/Cre Ratio 9 - 20 NOT REPORTED    Calcium 8.6 - 10.4 mg/dL 8.8    Sodium 135 - 144 mmol/L 141    Potassium 3.7 - 5.3 mmol/L 4.1    Chloride 98 - 107 mmol/L 104    CO2 20 - 31 mmol/L 27    Anion Gap 9 - 17 mmol/L 10    GFR Non-African American >60 mL/min >60    GFR African American >60 mL/min >60            CBC Auto Differential   Order: [id number]   Collected:  6/7/2018 11:10 Status:  Edited Result - FINAL    Ref Range & Units 1mo ago   WBC 3.5 - 11.3 k/uL 5.5    RBC 3.95 - 5.11 m/uL 4.60    Hemoglobin 11.9 - 15.1 g/dL 12.4    Hematocrit 36.3 - 47.1 % 37.6    MCV 82.6 - 102.9 fL 81.7     MCH 25.2 - 33.5 pg 27.0    MCHC 28.4 - 34.8 g/dL 33.0    RDW 11.8 - 14.4 % 17.7     Platelets 619 - 209 k/uL 230    MPV 8.1 - 13.5 fL 11.8    NRBC Automated 0.0 per 100 WBC 0.0    Differential Type  NOT REPORTED    Seg Neutrophils 36 - 65 % 57    Lymphocytes 24 - 43 % 27    Monocytes 3 - 12 % 10    Eosinophils % 1 - 4 % 5     Basophils 0 - 2 % 1    Immature Granulocytes 0 % 0    Segs Absolute 1.50 - 8.10 k/uL 3.14    Absolute Lymph # 1.10 - 3.70 k/uL 1.48    Absolute Mono # 0.10 - 1.20 k/uL 0.54    Absolute Eos # 0.00 - 0.44 k/uL 0.30    Basophils # 0.00 - 0.20 k/uL 0.04    Absolute Immature Granulocyte 0.00 - 0.30 k/uL <0.03                PREVIOUS LAB AND IMAGING      2/9/18     , BUN 14, CREAT 0.51, LYTES 140/4.3, ALT 21, ALB 3.9  CBC HGB 12.7, WBC 7.0, PLAT 240, N 52, L 31     LAB 5/11/17     CHOL 116, HDL 53, LDL 52, TRIG 54     LAB 4/28/17  MICRO ALB 12 MCG/MG     11/2/16 TSH  1.45      Thyroid Peroxidase (Tpo) Ab 15.7  0.0 - 35.0 IU/mL Final 12/13/2011  7:10 AM         THYROID ULTRASOUND  11/17/17     COMPARISON:   2 November 2016       HISTORY:   ORDERING SYSTEM PROVIDED HISTORY: Multinodular goiter (nontoxic)       FINDINGS:   Right thyroid lobe:  7.75 x 4.13 x 6.87 cm.       Left thyroid lobe:  6.19 x 3.46 x 3.04 cm.     Isthmus:  11.7 mm.       Thyroid Gland:  Thyroid is of heterogeneous echotexture with normal   vascularity.       Nodules: Multiple thyroid nodules are present including a dominant nodule on   the right side with increased vascularity of 6.52 x 6.03 x 4.31 cm.  A nodule   in the isthmus of 1.67 x 1.07 x 1.4 cm is noted.       In the left thyroid lobe up, nodule lateral upper pole 120 2 5 x 1.92 x 0.8   cm is noted with medial upper pole nodule of 0.8 x 0.9 x 0.7 cm with   calcification.  Inferior pole nodule left lobe of the thyroid of 2.79 x 3.28   x 3.0 cm is present.       Cervical lymphadenopathy: No abnormal lymph nodes in the imaged portions of   the neck.            Multiple thyroid nodules compatible with multi nodular goiter. Deangelo Skeens has   been interval enlargement of the dominant nodule in the inferior pole of the   right lobe of the thyroid, previously 5 cm maximum diameter.       RECOMMENDATIONS:   Tissue sampling of the dominant nodule in the right lobe of the thyroid is   advised.  Also suggest tissue sampling of the largest nodule in the inferior   pole of the left lobe of the thyroid.          EXAMINATION:   THYROID ULTRASOUND       11/2/2016       COMPARISON:   04/19/2013       HISTORY:   ORDERING SYSTEM PROVIDED HISTORY: THYROID CANCER       FINDINGS:   Right thyroid lobe:  6.9 x 4.2 x 4.7 cm       Left thyroid lobe:  5.2 x 2.7 x 2.7 cm       Isthmus:  1.3 cm       Thyroid Gland:  Thyroid gland demonstrates normal echotexture and vascularity.       Nodules:  There are multiple nodules throughout both thyroid lobes.  The   largest nodule is a dominant mass in the right thyroid lobe measuring up to 5   cm.  This is heterogeneous with minimal cystic change and a few punctate   calcifications.  The largest nodule on the left measures up to 1.3 cm.       Cervical lymphadenopathy: No abnormal lymph nodes in the imaged portions of   the neck.      PREVIOUS THYROID ULTRASOUND 11/2/16     EXAMINATION: THYROID ULTRASOUND       11/2/2016       COMPARISON:   04/19/2013       HISTORY:   ORDERING SYSTEM PROVIDED HISTORY: THYROID CANCER       FINDINGS:   Right thyroid lobe:  6.9 x 4.2 x 4.7 cm       Left thyroid lobe:  5.2 x 2.7 x 2.7 cm       Isthmus:  1.3 cm       Thyroid Gland:  Thyroid gland demonstrates normal echotexture and vascularity.       Nodules: There are multiple nodules throughout both thyroid lobes.  The   largest nodule is a dominant mass in the right thyroid lobe measuring up to 5   cm.  This is heterogeneous with minimal cystic change and a few punctate   calcifications.  The largest nodule on the left measures up to 1.3 cm.       Cervical lymphadenopathy: No abnormal lymph nodes in the imaged portions of   the neck. IMPRESSION :     1. [address] IN RT THYROID NODULE  REPORTED IN RECENT THYROID ULTRASOUND ON  11/17/17. PATIENT HAS NO SIGNIFICANT PRESSURE SYMPTOMS IN NECK AND IS EUTHYROID  FINE NEEDLE ASPIRATION  7/6/18   CONSISTENT WITH A BENIGN FOLLICULAR NODULE (INCLUDES         ADENOMATOID NODULE, COLLOID NODULE, ETC.)       OTHER PROBLEMS :     2. HYPERTENSION ON MULTIPLE B.P MEDS     3. TYPE 2 DIABETES MELLITUS OM GLUCOTROL AND  ONGLYZA     4. HYPERCHOLESTEROLEMIA ON LIPITOR     5. MORBID OBESITY     6. HX OF ASTHMA     7. DEG, ARTHRITIS BOTH KNEES     8.  POST MENOPAUSAL. S/P PARTIAL HYSTERECTOMY. HAS HOT FLASHES.     9. HX OF VIT D  DEFICIENCY     10. HX OF DEPRESSION STABLE ON CITALOPRAM       1. Multinodular goiter              Plan:      1. DISCUSSED THYROID FINE  NEEDLE  REPORT  2. CONTINUE ALL MEDS AS PER OTHER M.D  DISCUSSED  3. DIET  AND EXERCISE AS TOLERATED  4. RETURN 6 MONTHS  5. TO FOLLOW WITH PCP FOR ALL OTHER PROBLEMS INCLUDING DIABETES    No orders of the defined types were placed in this encounter. No orders of the defined types were placed in this encounter. No Follow-up on file.   Visit date not found         Patient given educational materials - see patient instructions. Discussed use, benefit, and side effects of prescribed medications. All patient questions answered. Pt voiced understanding. Reviewed health maintenance. Instructed to continue current medications, diet and exercise. Patient agreed with treatment plan. Follow up as directed.      Electronically signed by Rachel Gill MD on 7/31/18 at 11:50 AM

## 2018-08-01 ENCOUNTER — HOSPITAL ENCOUNTER (OUTPATIENT)
Dept: PHYSICAL THERAPY | Age: 63
Setting detail: THERAPIES SERIES
Discharge: HOME | End: 2018-08-01
Payer: MEDICARE

## 2018-08-01 PROCEDURE — 97110 THERAPEUTIC EXERCISES: CPT

## 2018-08-01 NOTE — FLOWSHEET NOTE
[place] now [place]    G-CODE     Functional Limitation: Basic Mobility  Functional Assessment Used:Lower Extremity Functional Scale score 58% impairment   Current Status Modifier: CK  Goal Status Modifier: CI  Discharge Status Modifier:     Pt. Education:  [x] Yes  [] No  [x] Reviewed Prior home exercise program  Method of Education: [x] Verbal  [x] Demo  [] Written  Comprehension of Education:  [x] Verbalizes understanding. [x] Demonstrates understanding with max verbal cues only  [x] Needs review. [] Demonstrates/verbalizes home exercise program/education/instructions previously given. 7/9/18: Hip IR/ER stretch, quad set, SLR, Hip ABD. Instructed pt to not lean on cane and to replace the bottom rubber tip for safety since it is sliding upon contact with the floor. 7/11/18: Instructed to change positions, stretch, LAQ, Ankle circles for long bus trip coming up. HP for pain at home. Plan: [x] Continue per plan of care. [x] Other: completd last two session and PT to see on last session for Gcodes.    Frequency:  2 x/week for 10 visits, may decrease to once weekly when patient becomes proficient in her home exercise program       Time In:  1005     Time Out:   1112    Electronically signed by:  Shawna Shane PTA

## 2018-08-06 ENCOUNTER — HOSPITAL ENCOUNTER (OUTPATIENT)
Dept: PHYSICAL THERAPY | Age: 63
Setting detail: THERAPIES SERIES
Discharge: HOME | End: 2018-08-06
Payer: MEDICARE

## 2018-08-06 PROCEDURE — 97110 THERAPEUTIC EXERCISES: CPT

## 2018-08-08 ENCOUNTER — HOSPITAL ENCOUNTER (OUTPATIENT)
Dept: PHYSICAL THERAPY | Age: 63
Setting detail: THERAPIES SERIES
Discharge: HOME | End: 2018-08-08
Payer: MEDICARE

## 2018-08-08 PROCEDURE — 97110 THERAPEUTIC EXERCISES: CPT

## 2018-08-08 PROCEDURE — G8980 MOBILITY D/C STATUS: HCPCS

## 2018-08-08 PROCEDURE — G8979 MOBILITY GOAL STATUS: HCPCS

## 2018-08-08 NOTE — FLOWSHEET NOTE
[x] Raine Mcnally       Outpatient Physical        Therapy       [address]       Phone: [phone]       Fax: [phone]     Physical Therapy Daily Treatment Note    Date:  2018  Patient Name:  Cleveland Brooks    :  1955  MRN: [id number]  Physician: Lotus Fields MD    Insurance: [place] [employer]  Diagnosis: Osteoarthritis of both knees; unspecified osteoarthritis type (M17.0)        Onset Date: ~10/2017   Next Dr. Coco Hartley: to be determined  Visit# / total visits: 10/10  Cancels/No Shows: 2/0    Subjective:    Pain:  [x] Yes  [] No Location: knees     Pain Rating: (0-10 scale) 5/10 left lateral knee, 4/10 right knee  Pain altered Tx:  [x] No  [] Yes  Action:  Comments: patient came in with shortness of breath - states it is hot outside and she had to take the bus today because her son had a flat tire. States it is hot out side and she feels short of breath     Objective:   Modalities:  Cervical HP to jennifer knee in sitting with one pillow and stool x 10 mins post exercise. Pt declined supine with wedge. Precautions: being referred to a specialist for vertigo. Patient  Has decreased   hearing especially left ear. Exercises:  Exercise  Bilateral knees Reps/ Time Weight/ Level Comments   Nu step  6 mins L2     supine      Patellar mobs   Limited movement, R<.L   Quad set jennifer  10 reps each 5 sec hold Reviewed with patient   Hip IR/ER stretch 12x  10 sec hold     Quad stretch 3 reps 30 sec hold Off side of mat, held due to time constraints        Short arc quad bilateral with (yellow) ball for VMO 10 reps each 3 sec hold Reviewed with patient    heels slides        Orange slider   Hip abd 10 reps  Orange slider, chg from SL due to weakness   SLR  2 sets of 5reps each    Verbal cues for tech, added 2nd set.      SLR W/ ER  5 reps each  added   Bridges 10x 3 sec hold     Sitting jennifer   bilateral   Ankle DF/PF 10 reps     Heel slides for flexion and extension bilatreal 5x2   reps each Reviewed with patient; verbal cues and demo prior to first repetition   Hamstring stretch with heel prop on stool   2  reps each 3 minute hold Verbal cues     LAQ w/ ball  2 sets of 5 reps each           Standing jennifer        Gastro stretch   wedge   Heel rasies      Hip ext, hip abd   Fatigues easily. Other:  Slow movements with all exercises. Pt requires max direction throughout. Specific Instructions for next treatment:          Treatment Charges: Mins Units   [x]  Modalities   HP   10 -   [x]  Ther Exercise  53  4   []  Manual Therapy      []  Ther Activities     []  Aquatics     []  Vasocompression     []  Other     Total Treatment time 53  4       Assessment: [x] Progressing toward goals 4-/5 bilateral quadriceps; patient able to walk several blocks from the bus stop today. Patient has purchased a cane tip for her straight cane     [] No change. [x] Other:pt continues to requires max  verbal cues for all exercises with set up and technique, then pt able to complete reps with intermittent verbal cues. Patient questioned therapist multiple times during each exercise about the nuber of repetitions. STG: (to be met in 10 treatments)  1. ? Pain:bilateral knees will be 5/10 at rest (progress made)  2. ? ROM:0-120 ° bilateral active knee range of motion (progress made - full extension bilateral knees)  3. ? Strength:VMO on  the left 4-/5 and patient will have no extensor lag with straight leg raise (goal met)  4. ? Function:patient will ambulate with the least restrictive device and minimal to no gait deviation (goal met)  5. Independent with Home Exercise Programs (progress made - patient is probably maximally medically improved in performance of her home exercise program)  6. Demonstrate Knowledge of joint protection (goal not met)  7.  Patient will have a Lower extremity Functional scale score of 40% or less (goal met)    Patient goals:to improve my walking and have less pain walking around ; 6/8/2018 patient states that she would [place] the [place] (goal met - patient states she could walk at the park but needs someone to take her there)    G-CODE     Functional Limitation: Basic Mobility  Functional Assessment Used:Lower Extremity Functional Scale score 58% impairment   Current Status Modifier: CK, but improved  Goal Status Modifier: CI  Discharge Status Modifier: CK    Pt. Education:  [x] Yes  [] No  [x] Reviewed Prior home exercise program  Method of Education: [x] Verbal  [x] Demo  [] Written  Comprehension of Education:  [x] Verbalizes understanding. [x] Demonstrates understanding with max verbal cues only  [x] Needs review. [] Demonstrates/verbalizes home exercise program/education/instructions previously given. 7/9/18: Hip IR/ER stretch, quad set, SLR, Hip ABD. Instructed pt to not lean on cane and to replace the bottom rubber tip for safety since it is sliding upon contact with the floor. 7/11/18: Instructed to change positions, stretch, LAQ, Ankle circles for long bus trip coming up. HP for pain at home. Plan: [] Continue per plan of care. [] Other: discontinue Physical Therapy  to home program.       Time In: 1500    Time Out:    3391    Electronically signed by:   Danielito Singleton PT

## 2018-08-08 NOTE — PROGRESS NOTES
extremity Functional scale score of 40% or less (goal met)     Patient goals:to improve my walking and have less pain walking around ; 6/8/2018 patient states that she would [place] the [place] (goal met - patient states she could walk at the park but needs someone to take her there)     G-CODE     Functional Limitation: Basic Mobility  Functional Assessment Used:Lower Extremity Functional Scale score 58% impairment   Current Status Modifier: CK, but improved  Goal Status Modifier: CI  Discharge Status Modifier: CK       Treatment to Date:  [x] Therapeutic Exercise    [x] Modalities:  [x] Therapeutic Activity    [] Ultrasound  [] Electrical Stimulation  [] Gait Training     [] Massage       [] Lumbar/Cervical Traction  [x] Neuromuscular Re-education [x] Cold/hotpack [] Iontophoresis: 4 mg/mL  [x] Instruction in Home Exercise Program                     Dexamethasone Sodium  [] Manual Therapy             Phosphate 40-80 mAmin  [] Aquatic Therapy                   [] Vasocompression/    [] Other:             Game Ready    Discharge Status:     [] Pt recovered from conditions. Treatment goals were met. [] Pt received maximum benefit. No further therapy indicated at this time. [x] Pt to continue exercise/home instructions independently. [] Therapy interrupted due to:    [] Pt has 2 or more no shows/cancels, is discontinued per our policy. [x] Pt has completed prescribed number of treatment sessions. [] Other:         Electronically signed by Lucilla Heimlich, PT on 8/8/2018 at 4:22 PM      If you have any questions or concerns, please don't hesitate to call.   Thank you for your referral.

## 2018-08-15 DIAGNOSIS — I10 ESSENTIAL HYPERTENSION: ICD-10-CM

## 2018-08-15 RX ORDER — LOSARTAN POTASSIUM 100 MG/1
TABLET ORAL
Qty: 28 TABLET | Refills: 3 | Status: SHIPPED | OUTPATIENT
Start: 2018-08-15 | End: 2018-12-11 | Stop reason: SDUPTHER

## 2018-08-15 NOTE — TELEPHONE ENCOUNTER
Next Visit Date:  Future Appointments  Date Time Provider [place]   8/20/2018 2:00 PM Neno Steinberg MD [id number]   2/6/2019 12:30 PM Juan Jose Santacruz MD [address] Maintenance   Topic Date Due    Hepatitis C screen  1955    HIV screen  10/26/1972    DTaP/Tdap/Td vaccine (1 - Tdap) 10/26/1976    Diabetic retinal exam  01/01/2017    Colon Cancer Screen FIT/FOBT  08/19/2017    Diabetic microalbuminuria test  04/27/2018    Lipid screen  05/01/2018    Shingles Vaccine (1 of 2 - 2 Dose Series) 10/05/2018 (Originally 10/26/2007)    Cervical cancer screen  10/23/2018 (Originally 10/24/2017)    Diabetic foot exam  01/19/2019 (Originally 8/31/2016)    Flu vaccine (1) 09/01/2018    Breast cancer screen  03/28/2019    A1C test (Diabetic or Prediabetic)  04/05/2019    Potassium monitoring  06/07/2019    Creatinine monitoring  06/07/2019    Pneumococcal med risk  Completed       Hemoglobin A1C (%)   Date Value   04/05/2018 7.3   08/16/2017 7.0   04/27/2017 6.8             ( goal A1C is < 7)   Microalb/Crt.  Ratio (mcg/mg creat)   Date Value   04/27/2017 12     LDL Cholesterol (mg/dL)   Date Value   05/01/2017 52   01/15/2016 62       (goal LDL is <100)   AST (U/L)   Date Value   02/09/2018 23     ALT (U/L)   Date Value   02/09/2018 21     BUN (mg/dL)   Date Value   06/07/2018 11     BP Readings from Last 3 Encounters:   07/31/18 138/60   06/07/18 138/69   05/23/18 128/80          (goal 120/80)    All Future Testing planned in [facility]  Lab Frequency Next Occurrence   TSH with Reflex Once 09/28/2018   POCT FECAL IMMUNOCHEMICAL TEST (FIT) Once 01/20/2019   PT eval and treat Once 02/19/2018   T4, Free Once 03/05/2019   TSH Once 03/05/2019   Microalbumin, Ur Once 05/23/2018   Lipid, Fasting Once 05/23/2018               Patient Active Problem List:     DM2 (diabetes mellitus, type 2)     HTN (hypertension)     Depression     Uncomplicated asthma     Onychomycosis

## 2018-08-20 ENCOUNTER — OFFICE VISIT (OUTPATIENT)
Dept: INTERNAL MEDICINE | Age: 63
End: 2018-08-20
Payer: MEDICARE

## 2018-08-20 ENCOUNTER — HOSPITAL ENCOUNTER (OUTPATIENT)
Age: 63
Setting detail: SPECIMEN
Discharge: HOME | End: 2018-08-20
Payer: MEDICARE

## 2018-08-20 VITALS
DIASTOLIC BLOOD PRESSURE: 78 MMHG | RESPIRATION RATE: 12 BRPM | WEIGHT: 270 LBS | OXYGEN SATURATION: 98 % | BODY MASS INDEX: 46.1 KG/M2 | SYSTOLIC BLOOD PRESSURE: 137 MMHG | HEIGHT: 64 IN | HEART RATE: 79 BPM

## 2018-08-20 DIAGNOSIS — E55.9 VITAMIN D DEFICIENCY DISEASE: ICD-10-CM

## 2018-08-20 DIAGNOSIS — E11.9 TYPE 2 DIABETES MELLITUS WITHOUT COMPLICATION, WITH LONG-TERM CURRENT USE OF INSULIN (HCC): ICD-10-CM

## 2018-08-20 DIAGNOSIS — E11.9 TYPE 2 DIABETES MELLITUS WITHOUT COMPLICATION, WITH LONG-TERM CURRENT USE OF INSULIN (HCC): Primary | ICD-10-CM

## 2018-08-20 DIAGNOSIS — Z13.220 SCREENING FOR HYPERLIPIDEMIA: ICD-10-CM

## 2018-08-20 DIAGNOSIS — G56.03 BILATERAL CARPAL TUNNEL SYNDROME: ICD-10-CM

## 2018-08-20 DIAGNOSIS — Z79.4 TYPE 2 DIABETES MELLITUS WITHOUT COMPLICATION, WITH LONG-TERM CURRENT USE OF INSULIN (HCC): Primary | ICD-10-CM

## 2018-08-20 DIAGNOSIS — R21 RASH: ICD-10-CM

## 2018-08-20 DIAGNOSIS — Z12.11 SCREENING FOR COLON CANCER: ICD-10-CM

## 2018-08-20 DIAGNOSIS — Z79.4 TYPE 2 DIABETES MELLITUS WITHOUT COMPLICATION, WITH LONG-TERM CURRENT USE OF INSULIN (HCC): ICD-10-CM

## 2018-08-20 LAB
CREATININE URINE: 81.9 MG/DL (ref 28–217)
HBA1C MFR BLD: 7.1 %
MICROALBUMIN/CREAT 24H UR: <12 MG/L
MICROALBUMIN/CREAT UR-RTO: NORMAL MCG/MG CREAT

## 2018-08-20 PROCEDURE — 83036 HEMOGLOBIN GLYCOSYLATED A1C: CPT | Performed by: INTERNAL MEDICINE

## 2018-08-20 PROCEDURE — 3045F PR MOST RECENT HEMOGLOBIN A1C LEVEL 7.0-9.0%: CPT | Performed by: INTERNAL MEDICINE

## 2018-08-20 PROCEDURE — G8427 DOCREV CUR MEDS BY ELIG CLIN: HCPCS | Performed by: INTERNAL MEDICINE

## 2018-08-20 PROCEDURE — G8417 CALC BMI ABV UP PARAM F/U: HCPCS | Performed by: INTERNAL MEDICINE

## 2018-08-20 PROCEDURE — 1036F TOBACCO NON-USER: CPT | Performed by: INTERNAL MEDICINE

## 2018-08-20 PROCEDURE — 3017F COLORECTAL CA SCREEN DOC REV: CPT | Performed by: INTERNAL MEDICINE

## 2018-08-20 PROCEDURE — 2022F DILAT RTA XM EVC RTNOPTHY: CPT | Performed by: INTERNAL MEDICINE

## 2018-08-20 PROCEDURE — 99214 OFFICE O/P EST MOD 30 MIN: CPT | Performed by: INTERNAL MEDICINE

## 2018-08-20 RX ORDER — CLOTRIMAZOLE AND BETAMETHASONE DIPROPIONATE 10; .64 MG/G; MG/G
CREAM TOPICAL
Qty: 1 TUBE | Refills: 0 | Status: SHIPPED | OUTPATIENT
Start: 2018-08-20 | End: 2018-09-13

## 2018-08-20 RX ORDER — ERGOCALCIFEROL 1.25 MG/1
50000 CAPSULE ORAL WEEKLY
Qty: 4 CAPSULE | Refills: 5 | Status: SHIPPED | OUTPATIENT
Start: 2018-08-20 | End: 2018-12-21 | Stop reason: SDUPTHER

## 2018-08-20 RX ORDER — NYSTATIN 100000 [USP'U]/G
POWDER TOPICAL
Qty: 1 BOTTLE | Refills: 0 | Status: SHIPPED | OUTPATIENT
Start: 2018-08-20 | End: 2018-09-13

## 2018-08-20 ASSESSMENT — ENCOUNTER SYMPTOMS
EYES NEGATIVE: 1
RESPIRATORY NEGATIVE: 1
ALLERGIC/IMMUNOLOGIC NEGATIVE: 1
GASTROINTESTINAL NEGATIVE: 1

## 2018-08-20 NOTE — PROGRESS NOTES
day when testing blood sugars 100 each 5    omeprazole (PRILOSEC) 20 MG delayed release capsule TAKE ONE CAPSULE BY MOUTH TWICE A DAY 60 capsule 3    acetaminophen (TYLENOL) 325 MG tablet Take 2 tablets by mouth every 6 hours as needed for Pain or Fever 40 tablet 0    saline nasal gel (AYR) GEL Use intranasally, in both nostrils, daily as needed for sinus irritation. 14.1 g 0    benzocaine-menthol (CEPACOL SORE THROAT) 15-3.6 MG lozenge Take 1 lozenge by mouth every 2 hours as needed for Sore Throat 40 lozenge 0    fluticasone (FLONASE) 50 MCG/ACT nasal spray 1 spray by Nasal route daily 1 Bottle 0    albuterol sulfate  (90 Base) MCG/ACT inhaler Inhale 2 puffs into the lungs every 6 hours as needed for Wheezing 1 Inhaler 3    nystatin (MYCOSTATIN) 322022 UNIT/GM powder Apply topically 4 times daily. 1 Bottle 0    clotrimazole-betamethasone (LOTRISONE) 1-0.05 % cream Apply topically 2 times daily. 1 Tube 0    Blood Pressure Monitoring (B-D ASSURE BPM/AUTO ARM CUFF) MISC Use daily for BP readings 1 each 0    amLODIPine (NORVASC) 5 MG tablet Take 5 mg by mouth daily      saxagliptin (ONGLYZA) 5 MG TABS tablet Take 1 tablet by mouth daily 30 tablet 3    glucose blood VI test strips (TRUETEST TEST) strip 1 each by In Vitro route daily Use daily in AM before meals. 100 each 5    Multiple Vitamins-Minerals (THERAPEUTIC MULTIVITAMIN-MINERALS) tablet Take 1 tablet by mouth daily 30 tablet 11     No current facility-administered medications for this visit.         Patient Care Team:  Blade Smith MD as PCP - General    PAST MEDICAL AND SURGICAL HISTORY:      Past Medical History:   Diagnosis Date    Anxiety     Asthma     CAD (coronary artery disease)     Depression     GERD (gastroesophageal reflux disease)     Hyperlipidemia     Hypertension     MI (myocardial infarction) ([address]     Obesity     Osteoarthritis     left knee    Sinus tarsi syndrome     Thyroid cyst     Type II or unspecified tenderness, deformity or swelling  Extremities - no pedal edema noted    LABORATORY FINDINGS:    CBC:   Lab Results   Component Value Date    WBC 5.5 06/07/2018    HGB 12.4 06/07/2018     06/07/2018     02/21/2012     BMP:    Lab Results   Component Value Date     06/07/2018    K 4.1 06/07/2018     06/07/2018    CO2 27 06/07/2018    BUN 11 06/07/2018    CREATININE 0.54 06/07/2018    GLUCOSE 210 06/07/2018    GLUCOSE 176 05/14/2012     Hemoglobin A1C:   Lab Results   Component Value Date    LABA1C 7.1 08/20/2018     Microalbumin Urine:   Lab Results   Component Value Date    MICROALBUR <12 04/27/2017     Lipid profile:   Lab Results   Component Value Date    CHOL 116 05/01/2017    TRIG 54 05/01/2017    HDL 53 05/01/2017     Thyroid functions:   Lab Results   Component Value Date    TSH 1.51 06/07/2018      Hepatic functions:   Lab Results   Component Value Date    ALT 21 02/09/2018    AST 23 02/09/2018    PROT 7.7 02/09/2018    BILITOT 0.25 02/09/2018    BILIDIR <0.08 08/11/2015    LABALBU 3.9 02/09/2018    LABALBU 4.3 05/14/2012     Urine Analysis: No results found for: [address] [address] Cohn:      Health Maintenance Due   Topic Date Due    Colon Cancer Screen FIT/FOBT  08/19/2017    Diabetic microalbuminuria test  04/27/2018    Lipid screen  05/01/2018       ASSESSMENT AND PLAN:       Diagnosis Orders   1. Type 2 diabetes mellitus without complication, with long-term current use of insulin (HCC)  POCT glycosylated hemoglobin (Hb A1C)- improving    Microalbumin, Ur   2. Screening for colon cancer  POCT FECAL IMMUNOCHEMICAL TEST (FIT)   3. Screening for hyperlipidemia  Lipid, Fasting   4. Vitamin D deficiency disease  vitamin D (ERGOCALCIFEROL) 10471 units CAPS capsule   5. Bilateral carpal tunnel syndrome  [facility] DO, Orthopedics [place]    Wrist splint   6.  Rash  nystatin (MYCOSTATIN) 439557 UNIT/GM powder    clotrimazole-betamethasone (LOTRISONE) 1-0.05 % cream         FOLLOW UP:   1. Brenda Segundo received counseling on the following healthy behaviors: nutrition and exercise    2. Reviewed prior labs and health maintenance. 3.  Discussed use, benefit, and side effects of prescribed medications. Barriers to medication compliance addressed. All patient questions answered. Pt voiced understanding. 4.  Continue current medications, diet and exercise. Orders Placed This Encounter   Medications    vitamin D (ERGOCALCIFEROL) 69545 units CAPS capsule     Sig: Take 1 capsule by mouth once a week     Dispense:  4 capsule     Refill:  5    nystatin (MYCOSTATIN) 439973 UNIT/GM powder     Sig: Apply topically 4 times daily. Dispense:  1 Bottle     Refill:  0    clotrimazole-betamethasone (LOTRISONE) 1-0.05 % cream     Sig: Apply topically 2 times daily. Dispense:  1 Tube     Refill:  0          Completed Refills               Requested Prescriptions     Signed Prescriptions Disp Refills    vitamin D (ERGOCALCIFEROL) 72376 units CAPS capsule 4 capsule 5     Sig: Take 1 capsule by mouth once a week    nystatin (MYCOSTATIN) 213828 UNIT/GM powder 1 Bottle 0     Sig: Apply topically 4 times daily.  clotrimazole-betamethasone (LOTRISONE) 1-0.05 % cream 1 Tube 0     Sig: Apply topically 2 times daily. 5. Patient given educational materials - see patient instructions    6. Was a self-tracking handout given in paper form or via [employer]? Yes  If yes, see orders or list here.       Orders Placed This Encounter   Procedures    Wrist splint     Use at night    Microalbumin, Ur     Standing Status:   Future     Standing Expiration Date:   8/20/2019    Lipid, Fasting     Standing Status:   Future     Standing Expiration Date:   8/20/2019   [facility] Richard Sanders DO, Orthopedics [facility]     Referral Priority:   Routine     Referral Type:   Consult for Advice and Opinion     Referral Reason:   Specialty Services Required     Referred to

## 2018-09-10 ENCOUNTER — OFFICE VISIT (OUTPATIENT)
Dept: ORTHOPEDIC SURGERY | Age: 63
End: 2018-09-10
Payer: MEDICARE

## 2018-09-10 VITALS — HEIGHT: 64 IN | WEIGHT: 270 LBS | BODY MASS INDEX: 46.1 KG/M2

## 2018-09-10 DIAGNOSIS — G56.03 BILATERAL CARPAL TUNNEL SYNDROME: Primary | ICD-10-CM

## 2018-09-10 PROCEDURE — G8417 CALC BMI ABV UP PARAM F/U: HCPCS | Performed by: ORTHOPAEDIC SURGERY

## 2018-09-10 PROCEDURE — 3017F COLORECTAL CA SCREEN DOC REV: CPT | Performed by: ORTHOPAEDIC SURGERY

## 2018-09-10 PROCEDURE — G8427 DOCREV CUR MEDS BY ELIG CLIN: HCPCS | Performed by: ORTHOPAEDIC SURGERY

## 2018-09-10 PROCEDURE — 99203 OFFICE O/P NEW LOW 30 MIN: CPT | Performed by: ORTHOPAEDIC SURGERY

## 2018-09-10 PROCEDURE — 1036F TOBACCO NON-USER: CPT | Performed by: ORTHOPAEDIC SURGERY

## 2018-09-10 ASSESSMENT — ENCOUNTER SYMPTOMS
SHORTNESS OF BREATH: 0
ABDOMINAL PAIN: 0

## 2018-09-10 NOTE — PROGRESS NOTES
[facility]  [facility]  [address] [place], [place] [address] [address] [address]  Dept: [phone]    Ambulatory Orthopedic New Patient Visit      CHIEF COMPLAINT:    Chief Complaint   Patient presents with    Hand Pain     Bilateral     HISTORY OF PRESENT ILLNESS:      The patient is a 63 y.o. female who is being seen for consultation and evaluation of Bilateral carpal tunnel syndrome. Patient reports progressive numbness in the thumb index and long finger of both hands as well as diminished  strength to the point that she is now dropping objects. The patient reports that this has gotten Substantially Worse in the Last 2 Months However Is Been Present for Several Years. She has not tried any formal treatment for this and she reports that it bothers her most when using her cane and at night. Past Medical History:    Past Medical History:   Diagnosis Date    Anxiety     Asthma     CAD (coronary artery disease)     Depression     GERD (gastroesophageal reflux disease)     Hyperlipidemia     Hypertension     [facility]) [address]     Obesity     Osteoarthritis     left knee    Sinus tarsi syndrome     Thyroid cyst     Type II or unspecified type diabetes mellitus without mention of complication, not stated as uncontrolled     Urinary incontinence     Vertigo        Past Surgical History:    Past Surgical History:   Procedure Laterality Date    BREAST CYST EXCISION      HYSTERECTOMY         Current Medications:   Current Outpatient Prescriptions   Medication Sig Dispense Refill    vitamin D (ERGOCALCIFEROL) 57377 units CAPS capsule Take 1 capsule by mouth once a week 4 capsule 5    nystatin (MYCOSTATIN) 821829 UNIT/GM powder Apply topically 4 times daily. 1 Bottle 0    clotrimazole-betamethasone (LOTRISONE) 1-0.05 % cream Apply topically 2 times daily.  1 Tube 0    losartan (COZAAR) 100 MG tablet TAKE ONE TABLET BY MOUTH ONE TIME A DAY 28 tablet 3    aspirin 81 MG

## 2018-09-13 ENCOUNTER — APPOINTMENT (OUTPATIENT)
Dept: GENERAL RADIOLOGY | Age: 63
End: 2018-09-13
Payer: MEDICARE

## 2018-09-13 ENCOUNTER — HOSPITAL ENCOUNTER (EMERGENCY)
Age: 63
Discharge: HOME | End: 2018-09-13
Attending: EMERGENCY MEDICINE
Payer: MEDICARE

## 2018-09-13 VITALS
SYSTOLIC BLOOD PRESSURE: 146 MMHG | HEART RATE: 87 BPM | HEIGHT: 64 IN | RESPIRATION RATE: 16 BRPM | TEMPERATURE: 98.5 F | BODY MASS INDEX: 46.1 KG/M2 | WEIGHT: 270 LBS | OXYGEN SATURATION: 97 % | DIASTOLIC BLOOD PRESSURE: 87 MMHG

## 2018-09-13 DIAGNOSIS — J06.9 VIRAL URI WITH COUGH: Primary | ICD-10-CM

## 2018-09-13 LAB
DIRECT EXAM: NORMAL
EKG ATRIAL RATE: 80 BPM
EKG P AXIS: 16 DEGREES
EKG P-R INTERVAL: 152 MS
EKG Q-T INTERVAL: 410 MS
EKG QRS DURATION: 84 MS
EKG QTC CALCULATION (BAZETT): 472 MS
EKG R AXIS: -27 DEGREES
EKG T AXIS: 28 DEGREES
EKG VENTRICULAR RATE: 80 BPM
Lab: NORMAL
SPECIMEN DESCRIPTION: NORMAL
STATUS: NORMAL

## 2018-09-13 PROCEDURE — 87804 INFLUENZA ASSAY W/OPTIC: CPT

## 2018-09-13 PROCEDURE — 6370000000 HC RX 637 (ALT 250 FOR IP): Performed by: EMERGENCY MEDICINE

## 2018-09-13 PROCEDURE — 99285 EMERGENCY DEPT VISIT HI MDM: CPT

## 2018-09-13 PROCEDURE — 71046 X-RAY EXAM CHEST 2 VIEWS: CPT

## 2018-09-13 PROCEDURE — 93005 ELECTROCARDIOGRAM TRACING: CPT

## 2018-09-13 RX ORDER — CETIRIZINE HYDROCHLORIDE, PSEUDOEPHEDRINE HYDROCHLORIDE 5; 120 MG/1; MG/1
1 TABLET, FILM COATED, EXTENDED RELEASE ORAL 2 TIMES DAILY
Qty: 14 TABLET | Refills: 0 | Status: SHIPPED | OUTPATIENT
Start: 2018-09-13 | End: 2018-09-20

## 2018-09-13 RX ORDER — FLUTICASONE PROPIONATE 50 MCG
1 SPRAY, SUSPENSION (ML) NASAL DAILY
Qty: 1 BOTTLE | Refills: 0 | Status: SHIPPED | OUTPATIENT
Start: 2018-09-13 | End: 2018-10-02 | Stop reason: SDUPTHER

## 2018-09-13 RX ORDER — CEPHALEXIN 500 MG/1
500 CAPSULE ORAL 4 TIMES DAILY
COMMUNITY
End: 2018-10-02

## 2018-09-13 RX ADMIN — BENZOCAINE AND MENTHOL 1 LOZENGE: 15; 3.6 LOZENGE ORAL at 01:44

## 2018-09-13 ASSESSMENT — PAIN DESCRIPTION - PAIN TYPE: TYPE: ACUTE PAIN

## 2018-09-13 ASSESSMENT — ENCOUNTER SYMPTOMS
VOMITING: 0
NAUSEA: 0
CHEST TIGHTNESS: 0
SHORTNESS OF BREATH: 0
ABDOMINAL PAIN: 0
COUGH: 1
EYE REDNESS: 0

## 2018-09-13 ASSESSMENT — PAIN SCALES - GENERAL: PAINLEVEL_OUTOF10: 5

## 2018-09-13 ASSESSMENT — PAIN DESCRIPTION - LOCATION: LOCATION: FACE;NOSE;THROAT

## 2018-09-13 NOTE — ED PROVIDER NOTES
no ST elevation QT corrected 472    CRITICAL CARE: There was a high probability of clinically significant/life threatening deterioration in this patient's condition which required my urgent intervention. Total critical care time was  0   minutes. This excludes any time for separately reportable procedures.        [address] [facility], DO  09/13/18 [facility], DO  09/13/18 [address], DO  09/13/18 [address], DO  09/13/18 0372 [facility], DO  09/13/18 8443

## 2018-09-13 NOTE — ED NOTES
Pt to ed with c/o nasal congestion and a sore throat x 30 mins. Pt reports her family has been sick and she thinks she caught something. Son at bedside. Awaiting orders.       Jassi Schuler RN  09/13/18 [id number]

## 2018-09-13 NOTE — ED PROVIDER NOTES
[facility]  ED  Emergency Department Encounter  Emergency Medicine Resident     Pt Name: Armin Izquierdo  MRN: [id number]  [place] 1955  Date of evaluation: 9/13/18  PCP:  Kirby Delaney MD    [address] [place] [address]       Chief Complaint   Patient presents with    Nasal Congestion     x 30 minutes       HISTORY OF PRESENT ILLNESS  (Location/Symptom, Timing/Onset, Context/Setting, Quality, Duration, Modifying Factors, Severity.)    Armin Izquierdo is a 63 y.o. female who presents with Sudden onset of nasal congestion, postnasal drip, chest congestion,Productive cough, chills and sweats that started about 30 minutes prior to arrival to the department. Patient states that her grandchild has been ill recently with similar symptoms. She tried drinking some hot tea at home without any relief. She has not had her flu shot this year. She states that she has also had some midsternal chest pressure that started with these other symptoms. The pain does not radiate anywhere and is not associated with anything. She denies shortness of breath, headache, neck pain, fever, abdominal pain, nausea, vomiting, diarrhea, rash. PAST MEDICAL / SURGICAL / SOCIAL / FAMILY HISTORY    has a past medical history of Anxiety; Asthma; CAD (coronary artery disease); Depression; GERD (gastroesophageal reflux disease); Hyperlipidemia; Hypertension; MI (myocardial infarction) ([address] Obesity; Osteoarthritis; Sinus tarsi syndrome; Thyroid cyst; Type II or unspecified type diabetes mellitus without mention of complication, not stated as uncontrolled; Urinary incontinence; and Vertigo. has a past surgical history that includes Hysterectomy and Breast cyst excision. Social History     Social History    Marital status:      Spouse name: N/A    Number of children: N/A    Years of education: N/A     Occupational History    Not on file.      Social History Main Topics    Smoking status: Never Smoker    Smokeless myalgias. Skin: Negative for rash. Allergic/Immunologic: Positive for immunocompromised state (type II diabetic). Neurological: Negative for dizziness, weakness, light-headedness and numbness. PHYSICAL EXAM   (up to 7 for level 4, 8 or more for level 5)    INITIAL VITALS:   ED Triage Vitals [09/13/18 0101]   BP Temp Temp Source Pulse Resp SpO2 Height Weight   (!) 146/87 98.5 °F (36.9 °C) Oral 87 16 97 % 5' 4\" (1.626 m) 270 lb (122.5 kg)       Physical Exam   Constitutional: She is oriented to person, place, and time. She appears well-developed and well-nourished. No distress. HENT:   Head: Normocephalic and atraumatic. Right Ear: External ear normal.   Left Ear: External ear normal.   Nose: Nose normal.   Mouth/Throat: Uvula is midline and mucous membranes are normal. No uvula swelling. Posterior oropharyngeal erythema present. No oropharyngeal exudate, posterior oropharyngeal edema or tonsillar abscesses. Cobblestoning to posterior oropharynx   Eyes: Pupils are equal, round, and reactive to light. Conjunctivae are normal.   Neck: Normal range of motion. Neck supple. Cardiovascular: Normal rate and regular rhythm. Murmur (3/6 systolic murmur) heard. Pulmonary/Chest: Effort normal and breath sounds normal. No respiratory distress. She has no wheezes. She has no rales. Abdominal: Soft. Bowel sounds are normal. She exhibits no distension. There is no tenderness. Musculoskeletal: Normal range of motion. She exhibits no edema. Neurological: She is alert and oriented to person, place, and time. Skin: Skin is warm and dry. No rash noted. She is not diaphoretic. Psychiatric: She has a normal mood and affect. Her behavior is normal.   Nursing note and vitals reviewed.       DIFFERENTIAL  DIAGNOSIS   PLAN (LABS / IMAGING / EKG):  Orders Placed This Encounter   Procedures    RAPID INFLUENZA A/B ANTIGENS    XR CHEST STANDARD (2 VW)    EKG 12 Lead       MEDICATIONS ORDERED:  Orders Placed This Complications, Morbidity, and/or Mortality  General comments: This patient this is likely a viral illness and is likely to get worse before it gets better. The patient never provided prescriptions for her that she should talk to the pharmacist to see if that these prescriptions or the over-the-counter would be more cost efficient for her. She is in agreement with this. Patient Progress  Patient progress: stable      PROCEDURES:  None indicated    CONSULTS:  None    CRITICAL CARE:  Please see attending note    FINAL IMPRESSION      1. Viral URI with cough        DISPOSITION / PLAN   DISPOSITION        Evaluation and treatment course in the ED, and plan of care upon discharge was discussed in length with the patient. Patient had no further questions prior to being discharged and was instructed to return to the ED for new or worsening symptoms. PATIENT REFERRED TO:  Vignesh Ponce MD  [address] [facility] [address]  [facility] [address] ED  [address] [place] [address]  [phone]    As needed, If symptoms worsen      DISCHARGE MEDICATIONS:  New Prescriptions    BENZOCAINE-MENTHOL (CEPACOL SORE THROAT) 15-3.6 MG LOZENGE    Take 1 lozenge by mouth every 2 hours as needed for Sore Throat    CETIRIZINE-PSUEDOEPHEDRINE (ZYRTEC-D) 5-120 MG PER EXTENDED RELEASE TABLET    Take 1 tablet by mouth 2 times daily for 7 days    FLUTICASONE (FLONASE) 50 MCG/ACT NASAL SPRAY    1 spray by Each Nare route daily         Angeline M. Butler Angelucci, DO  Emergency Medicine Resident Physician, PGY-1    (Please note that portions of this note were completed with a voice recognition program.  Efforts were made to edit the dictations but occasionally words are mis-transcribed.)       Suad Caban DO  Resident  09/13/18 [id number]

## 2018-09-14 DIAGNOSIS — Z13.31 POSITIVE DEPRESSION SCREENING: ICD-10-CM

## 2018-09-14 RX ORDER — CITALOPRAM 20 MG/1
TABLET ORAL
Qty: 30 TABLET | Refills: 3 | Status: SHIPPED | OUTPATIENT
Start: 2018-09-14 | End: 2018-12-21 | Stop reason: SDUPTHER

## 2018-09-14 RX ORDER — HYDRALAZINE HYDROCHLORIDE 25 MG/1
TABLET, FILM COATED ORAL
Qty: 120 TABLET | Refills: 3 | Status: SHIPPED | OUTPATIENT
Start: 2018-09-14 | End: 2018-12-21 | Stop reason: SDUPTHER

## 2018-09-14 NOTE — TELEPHONE ENCOUNTER
Next Visit Date:  Future Appointments  Date Time Provider [place]   2/6/2019 12:30 PM Mehul Bruce MD [address] Maintenance   Topic Date Due    Colon Cancer Screen FIT/FOBT  08/19/2017    Lipid screen  05/01/2018    Flu vaccine (1) 09/01/2018    Shingles Vaccine (1 of 2 - 2 Dose Series) 10/05/2018 (Originally 10/26/2007)    Cervical cancer screen  10/23/2018 (Originally 10/24/2017)    Diabetic foot exam  01/19/2019 (Originally 8/31/2016)    Diabetic retinal exam  02/20/2019 (Originally 1/1/2017)    DTaP/Tdap/Td vaccine (1 - Tdap) 02/20/2019 (Originally 10/26/1976)    Hepatitis C screen  02/20/2019 (Originally 1955)    HIV screen  02/20/2019 (Originally 10/26/1972)    Breast cancer screen  03/28/2019    Potassium monitoring  06/07/2019    Creatinine monitoring  06/07/2019    A1C test (Diabetic or Prediabetic)  08/20/2019    Diabetic microalbuminuria test  08/20/2019    Pneumococcal med risk  Completed       Hemoglobin A1C (%)   Date Value   08/20/2018 7.1   04/05/2018 7.3   08/16/2017 7.0             ( goal A1C is < 7)   Microalb/Crt.  Ratio (mcg/mg creat)   Date Value   08/20/2018 CANNOT BE CALCULATED     LDL Cholesterol (mg/dL)   Date Value   05/01/2017 52   01/15/2016 62       (goal LDL is <100)   AST (U/L)   Date Value   02/09/2018 23     ALT (U/L)   Date Value   02/09/2018 21     BUN (mg/dL)   Date Value   06/07/2018 11     BP Readings from Last 3 Encounters:   09/13/18 (!) 146/87   08/20/18 137/78   07/31/18 138/60          (goal 120/80)    All Future Testing planned in [facility]  Lab Frequency Next Occurrence   TSH with Reflex Once 09/28/2018   PT eval and treat Once 02/19/2018   T4, Free Once 03/05/2019   TSH Once 03/05/2019   POCT FECAL IMMUNOCHEMICAL TEST (FIT) Once 09/20/2018   Lipid, Fasting Once 09/19/2018   EMG Once 09/10/2018               Patient Active Problem List:     DM2 (diabetes mellitus, type 2)     HTN (hypertension) Depression     Uncomplicated asthma     Onychomycosis     Sinus tarsi syndrome     Multinodular goiter (nontoxic)     Obesity (BMI 30-39. 9)     Vitamin D deficiency disease     Hyperlipidemia with target LDL less than 100     Hallux limitus, bilateral      Depressive disorder, not elsewhere classified     Cough     Edema of right lower extremity     Benign positional vertigo

## 2018-09-19 DIAGNOSIS — Z79.4 TYPE 2 DIABETES MELLITUS WITHOUT COMPLICATION, WITH LONG-TERM CURRENT USE OF INSULIN (HCC): ICD-10-CM

## 2018-09-19 DIAGNOSIS — E11.9 TYPE 2 DIABETES MELLITUS WITHOUT COMPLICATION, WITH LONG-TERM CURRENT USE OF INSULIN (HCC): ICD-10-CM

## 2018-09-19 NOTE — TELEPHONE ENCOUNTER
Last visit:  Last Med refill:    Next Visit Date:  Future Appointments  Date Time Provider [place]   2/6/2019 12:30 PM Wynne Phalen, MD [address] Maintenance   Topic Date Due    Colon Cancer Screen FIT/FOBT  08/19/2017    Lipid screen  05/01/2018    Flu vaccine (1) 09/01/2018    Shingles Vaccine (1 of 2 - 2 Dose Series) 10/05/2018 (Originally 10/26/2007)    Cervical cancer screen  10/23/2018 (Originally 10/24/2017)    Diabetic foot exam  01/19/2019 (Originally 8/31/2016)    Diabetic retinal exam  02/20/2019 (Originally 1/1/2017)    DTaP/Tdap/Td vaccine (1 - Tdap) 02/20/2019 (Originally 10/26/1976)    Hepatitis C screen  02/20/2019 (Originally 1955)    HIV screen  02/20/2019 (Originally 10/26/1972)    Breast cancer screen  03/28/2019    Potassium monitoring  06/07/2019    Creatinine monitoring  06/07/2019    A1C test (Diabetic or Prediabetic)  08/20/2019    Diabetic microalbuminuria test  08/20/2019    Pneumococcal med risk  Completed       Hemoglobin A1C (%)   Date Value   08/20/2018 7.1   04/05/2018 7.3   08/16/2017 7.0             ( goal A1C is < 7)   Microalb/Crt.  Ratio (mcg/mg creat)   Date Value   08/20/2018 CANNOT BE CALCULATED     LDL Cholesterol (mg/dL)   Date Value   05/01/2017 52   01/15/2016 62       (goal LDL is <100)   AST (U/L)   Date Value   02/09/2018 23     ALT (U/L)   Date Value   02/09/2018 21     BUN (mg/dL)   Date Value   06/07/2018 11     BP Readings from Last 3 Encounters:   09/13/18 (!) 146/87   08/20/18 137/78   07/31/18 138/60          (goal 120/80)    All Future Testing planned in [facility]  Lab Frequency Next Occurrence   TSH with Reflex Once 09/28/2018   PT eval and treat Once 02/19/2018   T4, Free Once 03/05/2019   TSH Once 03/05/2019   POCT FECAL IMMUNOCHEMICAL TEST (FIT) Once 09/20/2018   Lipid, Fasting Once 09/19/2018   EMG Once 09/30/2018               Patient Active Problem List:     DM2 (diabetes mellitus, type 2) HTN (hypertension)     Depression     Uncomplicated asthma     Onychomycosis     Sinus tarsi syndrome     Multinodular goiter (nontoxic)     Obesity (BMI 30-39. 9)     Vitamin D deficiency disease     Hyperlipidemia with target LDL less than 100     Hallux limitus, bilateral      Depressive disorder, not elsewhere classified     Cough     Edema of right lower extremity     Benign positional vertigo

## 2018-09-20 ENCOUNTER — TELEPHONE (OUTPATIENT)
Dept: INTERNAL MEDICINE | Age: 63
End: 2018-09-20

## 2018-09-20 NOTE — TELEPHONE ENCOUNTER
Patient requesting new monitor and strips          Health Maintenance   Topic Date Due    Colon Cancer Screen FIT/FOBT  08/19/2017    Lipid screen  05/01/2018    Flu vaccine (1) 09/01/2018    Shingles Vaccine (1 of 2 - 2 Dose Series) 10/05/2018 (Originally 10/26/2007)    Cervical cancer screen  10/23/2018 (Originally 10/24/2017)    Diabetic foot exam  01/19/2019 (Originally 8/31/2016)    Diabetic retinal exam  02/20/2019 (Originally 1/1/2017)    DTaP/Tdap/Td vaccine (1 - Tdap) 02/20/2019 (Originally 10/26/1976)    Hepatitis C screen  02/20/2019 (Originally 1955)    HIV screen  02/20/2019 (Originally 10/26/1972)    Breast cancer screen  03/28/2019    Potassium monitoring  06/07/2019    Creatinine monitoring  06/07/2019    A1C test (Diabetic or Prediabetic)  08/20/2019    Diabetic microalbuminuria test  08/20/2019    Pneumococcal med risk  Completed             (applicable per patient's age: Cancer Screenings, Depression Screening, Fall Risk Screening, Immunizations)    Hemoglobin A1C (%)   Date Value   08/20/2018 7.1   04/05/2018 7.3   08/16/2017 7.0     Microalb/Crt.  Ratio (mcg/mg creat)   Date Value   08/20/2018 CANNOT BE CALCULATED     LDL Cholesterol (mg/dL)   Date Value   05/01/2017 52     AST (U/L)   Date Value   02/09/2018 23     ALT (U/L)   Date Value   02/09/2018 21     BUN (mg/dL)   Date Value   06/07/2018 11      (goal A1C is < 7)   (goal LDL is <100) need 30-50% reduction from baseline     BP Readings from Last 3 Encounters:   09/13/18 (!) 146/87   08/20/18 137/78   07/31/18 138/60    (goal /80)      All Future Testing planned in [facility]:  Lab Frequency Next Occurrence   TSH with Reflex Once 09/28/2018   PT eval and treat Once 02/19/2018   T4, Free Once 03/05/2019   TSH Once 03/05/2019   POCT FECAL IMMUNOCHEMICAL TEST (FIT) Once 09/20/2018   Lipid, Fasting Once 09/19/2018   EMG Once 09/30/2018       Next Visit Date:  Future Appointments  Date Time Provider [place]

## 2018-10-02 ENCOUNTER — OFFICE VISIT (OUTPATIENT)
Dept: INTERNAL MEDICINE | Age: 63
End: 2018-10-02
Payer: MEDICARE

## 2018-10-02 VITALS
BODY MASS INDEX: 45.58 KG/M2 | SYSTOLIC BLOOD PRESSURE: 134 MMHG | HEIGHT: 64 IN | DIASTOLIC BLOOD PRESSURE: 77 MMHG | WEIGHT: 267 LBS | TEMPERATURE: 98.4 F | HEART RATE: 92 BPM

## 2018-10-02 DIAGNOSIS — R05.3 CHRONIC COUGH: Primary | ICD-10-CM

## 2018-10-02 DIAGNOSIS — J45.30 MILD PERSISTENT ASTHMA WITHOUT COMPLICATION: ICD-10-CM

## 2018-10-02 DIAGNOSIS — J33.9 NASAL POLYP: ICD-10-CM

## 2018-10-02 PROCEDURE — 3017F COLORECTAL CA SCREEN DOC REV: CPT | Performed by: INTERNAL MEDICINE

## 2018-10-02 PROCEDURE — 1036F TOBACCO NON-USER: CPT | Performed by: INTERNAL MEDICINE

## 2018-10-02 PROCEDURE — G8427 DOCREV CUR MEDS BY ELIG CLIN: HCPCS | Performed by: INTERNAL MEDICINE

## 2018-10-02 PROCEDURE — G8417 CALC BMI ABV UP PARAM F/U: HCPCS | Performed by: INTERNAL MEDICINE

## 2018-10-02 PROCEDURE — G8484 FLU IMMUNIZE NO ADMIN: HCPCS | Performed by: INTERNAL MEDICINE

## 2018-10-02 PROCEDURE — 99213 OFFICE O/P EST LOW 20 MIN: CPT | Performed by: INTERNAL MEDICINE

## 2018-10-02 PROCEDURE — 99211 OFF/OP EST MAY X REQ PHY/QHP: CPT | Performed by: INTERNAL MEDICINE

## 2018-10-02 RX ORDER — ALBUTEROL SULFATE 90 UG/1
2 AEROSOL, METERED RESPIRATORY (INHALATION) EVERY 6 HOURS PRN
Qty: 1 INHALER | Refills: 3 | Status: SHIPPED | OUTPATIENT
Start: 2018-10-02 | End: 2018-12-21 | Stop reason: SDUPTHER

## 2018-10-02 RX ORDER — FLUTICASONE PROPIONATE 50 MCG
1 SPRAY, SUSPENSION (ML) NASAL DAILY
Qty: 1 BOTTLE | Refills: 0 | Status: SHIPPED | OUTPATIENT
Start: 2018-10-02 | End: 2019-05-24 | Stop reason: SDUPTHER

## 2018-10-02 RX ORDER — LORATADINE 10 MG/1
10 TABLET ORAL DAILY
Qty: 30 TABLET | Refills: 1 | Status: SHIPPED | OUTPATIENT
Start: 2018-10-02 | End: 2018-12-11 | Stop reason: SDUPTHER

## 2018-10-02 ASSESSMENT — ENCOUNTER SYMPTOMS
COUGH: 1
SORE THROAT: 0
SINUS PAIN: 0
HEMOPTYSIS: 0
SPUTUM PRODUCTION: 0
SHORTNESS OF BREATH: 0

## 2018-10-02 NOTE — PROGRESS NOTES
[facility]    Progress Note    Date of patient's visit: 10/2/2018    Patient's Name:  Jaclyn Sanchez    YOB: 1955            Patient Care Team:  Emilia Banda MD as PCP - General    REASON FOR VISIT: Routine outpatient follow     Chief Complaint   Patient presents with    Cough     Pt c/o having cough and nasal congestion over the last month.  Nasal Congestion         HISTORY OF PRESENT ILLNESS:    History was obtained from the patient. Jaclyn Sanchez is a 63 y.o. is here for Complaints of continuing dry cough. She was seen in the ER a month ago. She was prescribed Flonase and  Zyrtec which she says has not really helped significantly. She is mostly cough at night. She has postnasal drip. No sore throat. No fevers. No chills. She has no history of  COPD. She is not a smoker. She does have a history of asthma. She does not have albuterol anymore. She said symptoms are mostly at night. Symptoms are mostly seasonal.  She has a large goiter for which she is following up with endocrinology. She denies any difficulty swallowing at this point.     CXR     FINDINGS:   Again seen is mild cardiomegaly.  No focal consolidation.  No pleural space   disease. King Every prominent pulmonary vasculature is stable.  No acute osseous   abnormality.           Impression   Mild cardiomegaly.  No acute cardiopulmonary process.             Past Medical History:   Diagnosis Date    Anxiety     Asthma     CAD (coronary artery disease)     Depression     GERD (gastroesophageal reflux disease)     Hyperlipidemia     Hypertension     MI (myocardial infarction) ([address]     Obesity     Osteoarthritis     left knee    Sinus tarsi syndrome     Thyroid cyst     Type II or unspecified type diabetes mellitus without mention of complication, not stated as uncontrolled     Urinary incontinence     Vertigo        Past Surgical History:   Procedure Laterality 98.4 °F (36.9 °C)   TempSrc: Oral   Weight: 267 lb (121.1 kg)   Height: 5' 4\" (1.626 m)     Body mass index is 45.83 kg/m². BP Readings from Last 3 Encounters:   10/02/18 134/77   09/13/18 (!) 146/87   08/20/18 137/78        Wt Readings from Last 3 Encounters:   10/02/18 267 lb (121.1 kg)   09/13/18 270 lb (122.5 kg)   09/10/18 270 lb (122.5 kg)       Physical Exam   Constitutional: She is oriented to person, place, and time and well-developed, well-nourished, and in no distress. No distress. HENT:   Head: Normocephalic and atraumatic. Right Ear: Tympanic membrane normal.   Left Ear: Tympanic membrane normal.   Nose: Mucosal edema and rhinorrhea present. Right sinus exhibits no maxillary sinus tenderness and no frontal sinus tenderness. Left sinus exhibits no maxillary sinus tenderness and no frontal sinus tenderness. Mouth/Throat: Oropharynx is clear and moist.   B/l turbinate hypertrophy and possible polyp. Eyes: Pupils are equal, round, and reactive to light. Conjunctivae and EOM are normal. Right eye exhibits no discharge. Left eye exhibits no discharge. Neck: Normal range of motion. Neck supple. Thyromegaly (large goitre) present. Cardiovascular: Normal rate and regular rhythm. Pulmonary/Chest: Effort normal and breath sounds normal. She has no wheezes. She has no rales. Lymphadenopathy:     She has no cervical adenopathy. Neurological: She is alert and oriented to person, place, and time. Skin: Skin is warm and dry. She is not diaphoretic. Nursing note and vitals reviewed.           LABORATORY FINDINGS:    CBC:  Lab Results   Component Value Date    WBC 5.5 06/07/2018    HGB 12.4 06/07/2018     06/07/2018     02/21/2012     BMP:    Lab Results   Component Value Date     06/07/2018    K 4.1 06/07/2018     06/07/2018    CO2 27 06/07/2018    BUN 11 06/07/2018    CREATININE 0.54 06/07/2018    GLUCOSE 210 06/07/2018    GLUCOSE 176 05/14/2012     HEMOGLOBIN A1C:

## 2018-10-07 ASSESSMENT — ENCOUNTER SYMPTOMS
BLURRED VISION: 0
EYE DISCHARGE: 0
EYE PAIN: 0

## 2018-10-23 ENCOUNTER — OFFICE VISIT (OUTPATIENT)
Dept: INTERNAL MEDICINE | Age: 63
End: 2018-10-23
Payer: MEDICARE

## 2018-10-23 DIAGNOSIS — Z23 FLU VACCINE NEED: Primary | ICD-10-CM

## 2018-10-23 PROCEDURE — 90471 IMMUNIZATION ADMIN: CPT | Performed by: INTERNAL MEDICINE

## 2018-10-23 PROCEDURE — G8428 CUR MEDS NOT DOCUMENT: HCPCS | Performed by: INTERNAL MEDICINE

## 2018-10-23 PROCEDURE — 90688 IIV4 VACCINE SPLT 0.5 ML IM: CPT | Performed by: INTERNAL MEDICINE

## 2018-10-23 PROCEDURE — 3017F COLORECTAL CA SCREEN DOC REV: CPT | Performed by: INTERNAL MEDICINE

## 2018-10-23 PROCEDURE — G8417 CALC BMI ABV UP PARAM F/U: HCPCS | Performed by: INTERNAL MEDICINE

## 2018-10-23 NOTE — PROGRESS NOTES
Rodolfo Logan presents in the office today for Influenza vaccination. she denies any fevers or illness. she Tolerated the vaccination(s) well.  she was instructed to contact the office immediately if any significant sign of adverse reaction were to occur. There are no preventive care reminders to display for this patient. There are no preventive care reminders to display for this patient. Vaccine Information Sheet, \"Influenza - Inactivated\"  given to Elizabeth Bunn, or parent/legal guardian of  Elizabeth Bunn and verbalized understanding. Patient responses:    Have you ever had a reaction to a flu vaccine? No  Are you able to eat eggs without adverse effects? No  Do you have any current illness? No  Have you ever had [place] Syndrome? No    Flu vaccine given per order. Please see immunization tab.

## 2018-10-29 ENCOUNTER — HOSPITAL ENCOUNTER (EMERGENCY)
Age: 63
Discharge: HOME | End: 2018-10-29
Attending: EMERGENCY MEDICINE
Payer: MEDICARE

## 2018-10-29 VITALS
HEART RATE: 79 BPM | DIASTOLIC BLOOD PRESSURE: 75 MMHG | SYSTOLIC BLOOD PRESSURE: 137 MMHG | TEMPERATURE: 98.6 F | RESPIRATION RATE: 17 BRPM | BODY MASS INDEX: 46.95 KG/M2 | HEIGHT: 64 IN | WEIGHT: 275 LBS | OXYGEN SATURATION: 96 %

## 2018-10-29 DIAGNOSIS — B35.9 TINEA: Primary | ICD-10-CM

## 2018-10-29 PROCEDURE — 99282 EMERGENCY DEPT VISIT SF MDM: CPT

## 2018-10-29 RX ORDER — NYSTATIN 100000 U/G
CREAM TOPICAL
Qty: 1 TUBE | Refills: 0 | Status: SHIPPED | OUTPATIENT
Start: 2018-10-29 | End: 2019-09-26

## 2018-10-29 RX ORDER — NYSTATIN 100000 U/G
CREAM TOPICAL
Qty: 1 TUBE | Refills: 0 | Status: SHIPPED | OUTPATIENT
Start: 2018-10-29 | End: 2018-10-29

## 2018-10-29 ASSESSMENT — ENCOUNTER SYMPTOMS
VOMITING: 0
ABDOMINAL PAIN: 0
NAUSEA: 0
SORE THROAT: 0
COUGH: 0
SHORTNESS OF BREATH: 0
DIARRHEA: 0
BACK PAIN: 0

## 2018-10-29 NOTE — ED PROVIDER NOTES
hydrochlorothiazide (HYDRODIURIL) 50 MG tablet Take 50 mg by mouth dailyHistorical Med      Lancets MISC DAILY Starting Wed 3/14/2018, Disp-100 each, R-11, NormalUse 4 times daily Diagnisis:250.0  Diabetes Mellitus      Alcohol Swabs (ALCOHOL WIPES) 70 % PADS Disp-100 each, R-5, NormalUse up to 4 times a day when testing blood sugars      omeprazole (PRILOSEC) 20 MG delayed release capsule TAKE ONE CAPSULE BY MOUTH TWICE A DAY, Disp-60 capsule, R-3Normal      acetaminophen (TYLENOL) 325 MG tablet Take 2 tablets by mouth every 6 hours as needed for Pain or Fever, Disp-40 tablet, R-0Print      amLODIPine (NORVASC) 5 MG tablet Take 5 mg by mouth dailyHistorical Med      saxagliptin (ONGLYZA) 5 MG TABS tablet Take 1 tablet by mouth daily, Disp-30 tablet, R-3Normal      Multiple Vitamins-Minerals (THERAPEUTIC MULTIVITAMIN-MINERALS) tablet Take 1 tablet by mouth daily, Disp-30 tablet, R-11             ALLERGIES     Amoxicillin and Pcn [penicillins]  Reviewed  FAMILY HISTORY       Family History   Problem Relation Age of Onset    High Blood Pressure Mother     Seizures Mother     Heart Disease Father      Family Status   Relation Status    Mother Alive    Father         SOCIAL HISTORY      reports that she has never smoked. She has never used smokeless tobacco. She reports that she does not drink alcohol or use drugs. PHYSICAL EXAM    (up to 7 for level 4, 8 or more for level 5)     Vitals:    10/29/18 1433   BP: 137/75   Pulse: 79   Resp: 17   Temp: 98.6 °F (37 °C)   TempSrc: Oral   SpO2: 96%   Weight: 275 lb (124.7 kg)   Height: 5' 4\" (1.626 m)       Physical Exam   Constitutional: She is oriented to person, place, and time. She appears well-developed and well-nourished. No distress. HENT:   Head: Normocephalic and atraumatic. Eyes: Pupils are equal, round, and reactive to light. Conjunctivae are normal.   Neck: Normal range of motion. Neck supple.    Cardiovascular: Normal rate, regular rhythm,

## 2018-11-12 DIAGNOSIS — E11.9 TYPE 2 DIABETES MELLITUS WITHOUT COMPLICATION (HCC): ICD-10-CM

## 2018-11-12 DIAGNOSIS — E78.5 HYPERLIPIDEMIA WITH TARGET LDL LESS THAN 100: ICD-10-CM

## 2018-11-12 NOTE — TELEPHONE ENCOUNTER
Next Visit Date:  Future Appointments  Date Time Provider [place]   12/10/2018 11:30 AM Benny Cabrera MD [facility]   2/6/2019 12:30 PM Henrique Martinez MD [address] Maintenance   Topic Date Due    Shingles Vaccine (1 of 2 - 2 Dose Series) 10/26/2007    Colon Cancer Screen FIT/FOBT  08/19/2017    Cervical cancer screen  10/24/2017    Lipid screen  05/01/2018    Diabetic foot exam  01/19/2019 (Originally 8/31/2016)    Diabetic retinal exam  02/20/2019 (Originally 1/1/2017)    DTaP/Tdap/Td vaccine (1 - Tdap) 02/20/2019 (Originally 10/26/1976)    Hepatitis C screen  02/20/2019 (Originally 1955)    HIV screen  02/20/2019 (Originally 10/26/1972)    Breast cancer screen  03/28/2019    Potassium monitoring  06/07/2019    Creatinine monitoring  06/07/2019    A1C test (Diabetic or Prediabetic)  08/20/2019    Diabetic microalbuminuria test  08/20/2019    Flu vaccine  Completed    Pneumococcal med risk  Completed       Hemoglobin A1C (%)   Date Value   08/20/2018 7.1   04/05/2018 7.3   08/16/2017 7.0             ( goal A1C is < 7)   Microalb/Crt.  Ratio (mcg/mg creat)   Date Value   08/20/2018 CANNOT BE CALCULATED     LDL Cholesterol (mg/dL)   Date Value   05/01/2017 52   01/15/2016 62       (goal LDL is <100)   AST (U/L)   Date Value   02/09/2018 23     ALT (U/L)   Date Value   02/09/2018 21     BUN (mg/dL)   Date Value   06/07/2018 11     BP Readings from Last 3 Encounters:   10/29/18 137/75   10/02/18 134/77   09/13/18 (!) 146/87          (goal 120/80)    All Future Testing planned in [facility]  Lab Frequency Next Occurrence   PT eval and treat Once 02/19/2018   T4, Free Once 03/05/2019   TSH Once 03/05/2019   POCT FECAL IMMUNOCHEMICAL TEST (FIT) Once 09/20/2018   Lipid, Fasting Once 09/19/2018   EMG Once 09/30/2018               Patient Active Problem List:     DM2 (diabetes mellitus, type 2)     HTN (hypertension)     Depression     Uncomplicated asthma

## 2018-11-13 RX ORDER — ATORVASTATIN CALCIUM 40 MG/1
TABLET, FILM COATED ORAL
Qty: 30 TABLET | Refills: 0 | Status: SHIPPED | OUTPATIENT
Start: 2018-11-13 | End: 2018-12-11 | Stop reason: SDUPTHER

## 2018-11-13 RX ORDER — ASPIRIN 81 MG/1
TABLET, CHEWABLE ORAL
Qty: 30 TABLET | Refills: 0 | Status: SHIPPED | OUTPATIENT
Start: 2018-11-13 | End: 2018-12-11 | Stop reason: SDUPTHER

## 2018-11-13 RX ORDER — GLIPIZIDE 5 MG/1
TABLET ORAL
Qty: 60 TABLET | Refills: 0 | Status: SHIPPED | OUTPATIENT
Start: 2018-11-13 | End: 2018-12-11 | Stop reason: SDUPTHER

## 2018-11-13 RX ORDER — HYDRALAZINE HYDROCHLORIDE 50 MG/1
TABLET, FILM COATED ORAL
Qty: 120 TABLET | Refills: 0 | Status: SHIPPED | OUTPATIENT
Start: 2018-11-13 | End: 2018-12-11 | Stop reason: SDUPTHER

## 2018-11-26 ENCOUNTER — TELEPHONE (OUTPATIENT)
Dept: INTERNAL MEDICINE | Age: 63
End: 2018-11-26

## 2018-11-27 ENCOUNTER — APPOINTMENT (OUTPATIENT)
Dept: GENERAL RADIOLOGY | Age: 63
End: 2018-11-27
Payer: MEDICARE

## 2018-11-27 ENCOUNTER — HOSPITAL ENCOUNTER (OUTPATIENT)
Age: 63
Setting detail: OBSERVATION
Discharge: HOME | End: 2018-11-28
Attending: EMERGENCY MEDICINE | Admitting: EMERGENCY MEDICINE
Payer: MEDICARE

## 2018-11-27 DIAGNOSIS — R42 DIZZINESS: Primary | ICD-10-CM

## 2018-11-27 DIAGNOSIS — R07.9 CHEST PAIN, UNSPECIFIED TYPE: ICD-10-CM

## 2018-11-27 LAB
ABSOLUTE EOS #: 0 K/UL (ref 0–0.4)
ABSOLUTE IMMATURE GRANULOCYTE: 0 K/UL (ref 0–0.3)
ABSOLUTE LYMPH #: 0.43 K/UL (ref 1–4.8)
ABSOLUTE MONO #: 0.29 K/UL (ref 0.1–0.8)
ALBUMIN SERPL-MCNC: 4.1 G/DL (ref 3.5–5.2)
ALBUMIN/GLOBULIN RATIO: 1.1 (ref 1–2.5)
ALP BLD-CCNC: 56 U/L (ref 35–104)
ALT SERPL-CCNC: 24 U/L (ref 5–33)
ANION GAP SERPL CALCULATED.3IONS-SCNC: 12 MMOL/L (ref 9–17)
AST SERPL-CCNC: 31 U/L
BASOPHILS # BLD: 0 % (ref 0–2)
BASOPHILS ABSOLUTE: 0 K/UL (ref 0–0.2)
BILIRUB SERPL-MCNC: 0.47 MG/DL (ref 0.3–1.2)
BILIRUBIN DIRECT: 0.09 MG/DL
BILIRUBIN, INDIRECT: 0.38 MG/DL (ref 0–1)
BUN BLDV-MCNC: 10 MG/DL (ref 8–23)
BUN/CREAT BLD: ABNORMAL (ref 9–20)
CALCIUM SERPL-MCNC: 9.6 MG/DL (ref 8.6–10.4)
CHLORIDE BLD-SCNC: 104 MMOL/L (ref 98–107)
CO2: 24 MMOL/L (ref 20–31)
CREAT SERPL-MCNC: 0.5 MG/DL (ref 0.5–0.9)
DIFFERENTIAL TYPE: ABNORMAL
EOSINOPHILS RELATIVE PERCENT: 0 % (ref 1–4)
GFR AFRICAN AMERICAN: >60 ML/MIN
GFR NON-AFRICAN AMERICAN: >60 ML/MIN
GFR SERPL CREATININE-BSD FRML MDRD: ABNORMAL ML/MIN/{1.73_M2}
GFR SERPL CREATININE-BSD FRML MDRD: ABNORMAL ML/MIN/{1.73_M2}
GLOBULIN: NORMAL G/DL (ref 1.5–3.8)
GLUCOSE BLD-MCNC: 173 MG/DL (ref 70–99)
HCT VFR BLD CALC: 40.3 % (ref 36.3–47.1)
HEMOGLOBIN: 13.1 G/DL (ref 11.9–15.1)
IMMATURE GRANULOCYTES: 0 %
LIPASE: 24 U/L (ref 13–60)
LYMPHOCYTES # BLD: 6 % (ref 24–44)
MCH RBC QN AUTO: 26.6 PG (ref 25.2–33.5)
MCHC RBC AUTO-ENTMCNC: 32.5 G/DL (ref 28.4–34.8)
MCV RBC AUTO: 81.9 FL (ref 82.6–102.9)
MONOCYTES # BLD: 4 % (ref 1–7)
MORPHOLOGY: ABNORMAL
NRBC AUTOMATED: 0 PER 100 WBC
PDW BLD-RTO: 17.5 % (ref 11.8–14.4)
PLATELET # BLD: 238 K/UL (ref 138–453)
PLATELET ESTIMATE: ABNORMAL
PMV BLD AUTO: 11.8 FL (ref 8.1–13.5)
POC TROPONIN I: 0.01 NG/ML (ref 0–0.1)
POC TROPONIN INTERP: NORMAL
POTASSIUM SERPL-SCNC: 4.3 MMOL/L (ref 3.7–5.3)
RBC # BLD: 4.92 M/UL (ref 3.95–5.11)
RBC # BLD: ABNORMAL 10*6/UL
SEG NEUTROPHILS: 90 % (ref 36–66)
SEGMENTED NEUTROPHILS ABSOLUTE COUNT: 6.48 K/UL (ref 1.8–7.7)
SODIUM BLD-SCNC: 140 MMOL/L (ref 135–144)
TOTAL PROTEIN: 7.7 G/DL (ref 6.4–8.3)
TROPONIN INTERP: NORMAL
TROPONIN T: <0.03 NG/ML
WBC # BLD: 7.2 K/UL (ref 3.5–11.3)
WBC # BLD: ABNORMAL 10*3/UL

## 2018-11-27 PROCEDURE — G0378 HOSPITAL OBSERVATION PER HR: HCPCS

## 2018-11-27 PROCEDURE — 93005 ELECTROCARDIOGRAM TRACING: CPT

## 2018-11-27 PROCEDURE — 80048 BASIC METABOLIC PNL TOTAL CA: CPT

## 2018-11-27 PROCEDURE — 36415 COLL VENOUS BLD VENIPUNCTURE: CPT

## 2018-11-27 PROCEDURE — 6360000002 HC RX W HCPCS: Performed by: EMERGENCY MEDICINE

## 2018-11-27 PROCEDURE — 76937 US GUIDE VASCULAR ACCESS: CPT

## 2018-11-27 PROCEDURE — 84484 ASSAY OF TROPONIN QUANT: CPT

## 2018-11-27 PROCEDURE — 99285 EMERGENCY DEPT VISIT HI MDM: CPT

## 2018-11-27 PROCEDURE — 96374 THER/PROPH/DIAG INJ IV PUSH: CPT

## 2018-11-27 PROCEDURE — 6370000000 HC RX 637 (ALT 250 FOR IP): Performed by: EMERGENCY MEDICINE

## 2018-11-27 PROCEDURE — 2580000003 HC RX 258: Performed by: EMERGENCY MEDICINE

## 2018-11-27 PROCEDURE — 83690 ASSAY OF LIPASE: CPT

## 2018-11-27 PROCEDURE — 80076 HEPATIC FUNCTION PANEL: CPT

## 2018-11-27 PROCEDURE — 6370000000 HC RX 637 (ALT 250 FOR IP): Performed by: STUDENT IN AN ORGANIZED HEALTH CARE EDUCATION/TRAINING PROGRAM

## 2018-11-27 PROCEDURE — 71046 X-RAY EXAM CHEST 2 VIEWS: CPT

## 2018-11-27 PROCEDURE — 85025 COMPLETE CBC W/AUTO DIFF WBC: CPT

## 2018-11-27 PROCEDURE — 96361 HYDRATE IV INFUSION ADD-ON: CPT

## 2018-11-27 RX ORDER — AMLODIPINE BESYLATE 5 MG/1
5 TABLET ORAL DAILY
Status: DISCONTINUED | OUTPATIENT
Start: 2018-11-27 | End: 2018-11-28 | Stop reason: HOSPADM

## 2018-11-27 RX ORDER — HYDRALAZINE HYDROCHLORIDE 50 MG/1
50 TABLET, FILM COATED ORAL EVERY 6 HOURS SCHEDULED
Status: DISCONTINUED | OUTPATIENT
Start: 2018-11-27 | End: 2018-11-27

## 2018-11-27 RX ORDER — ASPIRIN 81 MG/1
81 TABLET, CHEWABLE ORAL DAILY
Status: DISCONTINUED | OUTPATIENT
Start: 2018-11-27 | End: 2018-11-28 | Stop reason: HOSPADM

## 2018-11-27 RX ORDER — SODIUM CHLORIDE 0.9 % (FLUSH) 0.9 %
10 SYRINGE (ML) INJECTION EVERY 12 HOURS SCHEDULED
Status: DISCONTINUED | OUTPATIENT
Start: 2018-11-27 | End: 2018-11-28 | Stop reason: HOSPADM

## 2018-11-27 RX ORDER — MECLIZINE HCL 12.5 MG/1
25 TABLET ORAL EVERY 6 HOURS PRN
Status: DISCONTINUED | OUTPATIENT
Start: 2018-11-27 | End: 2018-11-28 | Stop reason: HOSPADM

## 2018-11-27 RX ORDER — SODIUM CHLORIDE 0.9 % (FLUSH) 0.9 %
10 SYRINGE (ML) INJECTION PRN
Status: DISCONTINUED | OUTPATIENT
Start: 2018-11-27 | End: 2018-11-28 | Stop reason: HOSPADM

## 2018-11-27 RX ORDER — LOSARTAN POTASSIUM 50 MG/1
100 TABLET ORAL DAILY
Status: DISCONTINUED | OUTPATIENT
Start: 2018-11-27 | End: 2018-11-28 | Stop reason: HOSPADM

## 2018-11-27 RX ORDER — 0.9 % SODIUM CHLORIDE 0.9 %
1000 INTRAVENOUS SOLUTION INTRAVENOUS ONCE
Status: COMPLETED | OUTPATIENT
Start: 2018-11-27 | End: 2018-11-27

## 2018-11-27 RX ORDER — ONDANSETRON 2 MG/ML
4 INJECTION INTRAMUSCULAR; INTRAVENOUS ONCE
Status: COMPLETED | OUTPATIENT
Start: 2018-11-27 | End: 2018-11-27

## 2018-11-27 RX ORDER — ATORVASTATIN CALCIUM 40 MG/1
40 TABLET, FILM COATED ORAL DAILY
Status: DISCONTINUED | OUTPATIENT
Start: 2018-11-27 | End: 2018-11-28 | Stop reason: HOSPADM

## 2018-11-27 RX ORDER — MECLIZINE HCL 12.5 MG/1
25 TABLET ORAL ONCE
Status: COMPLETED | OUTPATIENT
Start: 2018-11-27 | End: 2018-11-27

## 2018-11-27 RX ORDER — LOSARTAN POTASSIUM 50 MG/1
100 TABLET ORAL DAILY
Status: DISCONTINUED | OUTPATIENT
Start: 2018-11-27 | End: 2018-11-27

## 2018-11-27 RX ORDER — HYDRALAZINE HYDROCHLORIDE 50 MG/1
50 TABLET, FILM COATED ORAL ONCE
Status: DISCONTINUED | OUTPATIENT
Start: 2018-11-27 | End: 2018-11-27

## 2018-11-27 RX ORDER — HYDRALAZINE HYDROCHLORIDE 25 MG/1
25 TABLET, FILM COATED ORAL EVERY 6 HOURS SCHEDULED
Status: DISCONTINUED | OUTPATIENT
Start: 2018-11-27 | End: 2018-11-27

## 2018-11-27 RX ORDER — CITALOPRAM 20 MG/1
20 TABLET ORAL DAILY
Status: DISCONTINUED | OUTPATIENT
Start: 2018-11-27 | End: 2018-11-28 | Stop reason: HOSPADM

## 2018-11-27 RX ORDER — AMLODIPINE BESYLATE 10 MG/1
5 TABLET ORAL ONCE
Status: COMPLETED | OUTPATIENT
Start: 2018-11-27 | End: 2018-11-27

## 2018-11-27 RX ORDER — HYDRALAZINE HYDROCHLORIDE 50 MG/1
75 TABLET, FILM COATED ORAL ONCE
Status: COMPLETED | OUTPATIENT
Start: 2018-11-27 | End: 2018-11-27

## 2018-11-27 RX ORDER — HYDROCHLOROTHIAZIDE 50 MG/1
50 TABLET ORAL DAILY
Status: DISCONTINUED | OUTPATIENT
Start: 2018-11-27 | End: 2018-11-28 | Stop reason: HOSPADM

## 2018-11-27 RX ORDER — HYDRALAZINE HYDROCHLORIDE 50 MG/1
50 TABLET, FILM COATED ORAL EVERY 6 HOURS SCHEDULED
Status: DISCONTINUED | OUTPATIENT
Start: 2018-11-28 | End: 2018-11-28 | Stop reason: HOSPADM

## 2018-11-27 RX ORDER — METOPROLOL TARTRATE 50 MG/1
75 TABLET, FILM COATED ORAL ONCE
Status: DISCONTINUED | OUTPATIENT
Start: 2018-11-27 | End: 2018-11-28 | Stop reason: HOSPADM

## 2018-11-27 RX ORDER — HYDROCHLOROTHIAZIDE 25 MG/1
50 TABLET ORAL ONCE
Status: COMPLETED | OUTPATIENT
Start: 2018-11-27 | End: 2018-11-27

## 2018-11-27 RX ORDER — ACETAMINOPHEN 325 MG/1
650 TABLET ORAL EVERY 4 HOURS PRN
Status: DISCONTINUED | OUTPATIENT
Start: 2018-11-27 | End: 2018-11-27

## 2018-11-27 RX ORDER — ACETAMINOPHEN 500 MG
1000 TABLET ORAL EVERY 6 HOURS PRN
Status: DISCONTINUED | OUTPATIENT
Start: 2018-11-27 | End: 2018-11-28

## 2018-11-27 RX ADMIN — HYDRALAZINE HYDROCHLORIDE 75 MG: 50 TABLET, FILM COATED ORAL at 15:06

## 2018-11-27 RX ADMIN — HYDROCHLOROTHIAZIDE 50 MG: 25 TABLET ORAL at 15:06

## 2018-11-27 RX ADMIN — LOSARTAN POTASSIUM 100 MG: 50 TABLET, FILM COATED ORAL at 15:39

## 2018-11-27 RX ADMIN — ASPIRIN 81 MG: 81 TABLET, CHEWABLE ORAL at 20:56

## 2018-11-27 RX ADMIN — MECLIZINE HYDROCHLORIDE 25 MG: 12.5 TABLET, FILM COATED ORAL at 13:33

## 2018-11-27 RX ADMIN — ACETAMINOPHEN 1000 MG: 500 TABLET ORAL at 20:56

## 2018-11-27 RX ADMIN — SODIUM CHLORIDE 1000 ML: 9 INJECTION, SOLUTION INTRAVENOUS at 13:33

## 2018-11-27 RX ADMIN — Medication 10 ML: at 19:48

## 2018-11-27 RX ADMIN — ONDANSETRON 4 MG: 2 INJECTION INTRAMUSCULAR; INTRAVENOUS at 13:33

## 2018-11-27 RX ADMIN — HYDRALAZINE HYDROCHLORIDE 50 MG: 50 TABLET, FILM COATED ORAL at 23:07

## 2018-11-27 RX ADMIN — AMLODIPINE BESYLATE 5 MG: 10 TABLET ORAL at 15:05

## 2018-11-27 ASSESSMENT — ENCOUNTER SYMPTOMS
ABDOMINAL PAIN: 1
RHINORRHEA: 0
VOMITING: 1
NAUSEA: 1
CONSTIPATION: 0
SHORTNESS OF BREATH: 0

## 2018-11-27 ASSESSMENT — PAIN SCALES - GENERAL
PAINLEVEL_OUTOF10: 6
PAINLEVEL_OUTOF10: 8

## 2018-11-27 ASSESSMENT — HEART SCORE: ECG: 1

## 2018-11-27 ASSESSMENT — PAIN DESCRIPTION - LOCATION: LOCATION: HEAD

## 2018-11-27 ASSESSMENT — PAIN DESCRIPTION - PAIN TYPE: TYPE: ACUTE PAIN

## 2018-11-27 ASSESSMENT — PAIN DESCRIPTION - DESCRIPTORS: DESCRIPTORS: DISCOMFORT

## 2018-11-27 NOTE — ED PROVIDER NOTES
guarding no rebound    Impression: Dizziness, abdominal pain    Plan: Fluids, Antivert, EKG, CBC, BMP, troponin      EKG Interpretation    Interpreted by me  Normal sinus rhythm with a ventricular rate of 72, normal. Animal, normal QRS duration, left axis deviation, moderate coated criteria for left ventricular hypertrophy, prolonged QT corrected, poor progression of the R wave in the precordial leads  Compared to EKG of September 13, 2018, no significant change was noted        (Please note that portions of this note were completed with a voice recognition program.  Efforts were made to edit the dictations but occasionally words are mis-transcribed.)    Itzel Davies.  Dustin Trujillo MD, [facility]  Attending Emergency Medicine Physician         Ibis Oakley MD  11/27/18 9383
tablet TAKE ONE HALF TO ONE TABLET BY MOUTH DAILY 9/14/18   Stanley Coronel MD   hydrALAZINE (APRESOLINE) 25 MG tablet TAKE ONE TABLET BY MOUTH FOUR TIMES A DAY ALONG WITH 50MG TABLET 9/14/18   Stanley Coronel MD   vitamin D (ERGOCALCIFEROL) 47143 units CAPS capsule Take 1 capsule by mouth once a week 8/20/18   Stanley Coronel MD   losartan (COZAAR) 100 MG tablet TAKE ONE TABLET BY MOUTH ONE TIME A DAY 8/15/18   Stanley Coronel MD   metoprolol tartrate (LOPRESSOR) 50 MG tablet TAKE 1 & 1/2 TABLETS BY MOUTH TWICE DAILY 6/25/18   Stanley Coronel MD   hydrochlorothiazide (HYDRODIURIL) 50 MG tablet Take 50 mg by mouth daily    Historical Provider, MD   Lancets MISC 1 each by Does not apply route daily Use 4 times daily Diagnisis:250.0  Diabetes Mellitus 3/14/18   Stanley Coronel MD   Alcohol Swabs (ALCOHOL WIPES) 70 % PADS Use up to 4 times a day when testing blood sugars 3/14/18   Stanley Coronel MD   omeprazole (PRILOSEC) 20 MG delayed release capsule TAKE ONE CAPSULE BY MOUTH TWICE A DAY 3/8/18   Stanley Coronel MD   acetaminophen (TYLENOL) 325 MG tablet Take 2 tablets by mouth every 6 hours as needed for Pain or Fever 2/9/18   Joaquin Lindquist PA-C   amLODIPine (NORVASC) 5 MG tablet Take 5 mg by mouth daily    Historical Provider, MD   saxagliptin (ONGLYZA) 5 MG TABS tablet Take 1 tablet by mouth daily 8/16/17   Janessa Grimes MD   Multiple Vitamins-Minerals (THERAPEUTIC MULTIVITAMIN-MINERALS) tablet Take 1 tablet by mouth daily 3/11/16   Adin Cuenca MD       REVIEW OF SYSTEMS    (2-9 systems for level 4, 10 or more for level 5)      Review of Systems   Constitutional: Negative for chills and fever. HENT: Negative for congestion and rhinorrhea. Eyes: Negative for visual disturbance. Respiratory: Negative for shortness of breath. Cardiovascular: Negative for chest pain. Gastrointestinal: Positive for abdominal pain, nausea and vomiting. Negative for constipation.    Genitourinary: Negative for dysuria and

## 2018-11-27 NOTE — DISCHARGE INSTR - COC
 Depressive disorder, not elsewhere classified F32.9    Edema of right lower extremity R60.0    Benign positional vertigo H81.10    Dizzy R42       Isolation/Infection:   Isolation          No Isolation            Nurse Assessment:  Last Vital Signs: BP (!) 190/94   Pulse 58   Temp 97.6 °F (36.4 °C) (Oral)   Resp 13   Wt 270 lb (122.5 kg)   SpO2 98%   BMI 46.35 kg/m²     Last documented pain score (0-10 scale): Pain Level: 8  Last Weight:   Wt Readings from Last 1 Encounters:   11/27/18 270 lb (122.5 kg)     Mental Status:  oriented    IV Access:  - None    Nursing Mobility/ADLs:  Walking   Assisted  Transfer  Independent  Bathing  Assisted  Dressing  Assisted  [address]  Independent  Med Delivery   whole    Wound Care Documentation and Therapy:        Elimination:  Continence:   · Bowel: No  · Bladder: No  Urinary Catheter: None   Colostomy/Ileostomy/Ileal Conduit: No       Date of Last BM: 11/27/18  No intake or output data in the 24 hours ending 11/27/18 1531  No intake/output data recorded. Safety Concerns: At Risk for Falls    Impairments/Disabilities:      None    Nutrition Therapy:  Current Nutrition Therapy:   - Oral Diet:  Carb Control 3 carbs/meal (1500kcals/day)    Routes of Feeding: Oral  Liquids: No Restrictions  Daily Fluid Restriction: no  Last Modified Barium Swallow with Video (Video Swallowing Test): not done    Treatments at the Time of Hospital Discharge:   Respiratory Treatments: ***  Oxygen Therapy:  is not on home oxygen therapy.   Ventilator:    - No ventilator support    Rehab Therapies: Physical Therapy  Weight Bearing Status/Restrictions: No weight bearing restirctions  Other Medical Equipment (for information only, NOT a DME order):  walker  Other Treatments: ***    Patient's personal belongings (please select all that are sent with patient):  None    RN SIGNATURE:  Electronically signed by Abram Dailey RN on 11/28/18 at

## 2018-11-28 ENCOUNTER — TELEPHONE (OUTPATIENT)
Dept: INTERNAL MEDICINE | Age: 63
End: 2018-11-28

## 2018-11-28 VITALS
RESPIRATION RATE: 16 BRPM | SYSTOLIC BLOOD PRESSURE: 132 MMHG | TEMPERATURE: 98.9 F | HEIGHT: 64 IN | WEIGHT: 266.6 LBS | HEART RATE: 73 BPM | DIASTOLIC BLOOD PRESSURE: 79 MMHG | BODY MASS INDEX: 45.52 KG/M2 | OXYGEN SATURATION: 94 %

## 2018-11-28 LAB
EKG ATRIAL RATE: 72 BPM
EKG ATRIAL RATE: 74 BPM
EKG P AXIS: 25 DEGREES
EKG P AXIS: 64 DEGREES
EKG P-R INTERVAL: 178 MS
EKG P-R INTERVAL: 184 MS
EKG Q-T INTERVAL: 442 MS
EKG Q-T INTERVAL: 454 MS
EKG QRS DURATION: 92 MS
EKG QRS DURATION: 98 MS
EKG QTC CALCULATION (BAZETT): 483 MS
EKG QTC CALCULATION (BAZETT): 503 MS
EKG R AXIS: -19 DEGREES
EKG R AXIS: -25 DEGREES
EKG T AXIS: 14 DEGREES
EKG T AXIS: 6 DEGREES
EKG VENTRICULAR RATE: 72 BPM
EKG VENTRICULAR RATE: 74 BPM
TROPONIN INTERP: NORMAL
TROPONIN T: <0.03 NG/ML

## 2018-11-28 PROCEDURE — 6370000000 HC RX 637 (ALT 250 FOR IP): Performed by: STUDENT IN AN ORGANIZED HEALTH CARE EDUCATION/TRAINING PROGRAM

## 2018-11-28 PROCEDURE — 93005 ELECTROCARDIOGRAM TRACING: CPT

## 2018-11-28 PROCEDURE — 36415 COLL VENOUS BLD VENIPUNCTURE: CPT

## 2018-11-28 PROCEDURE — 6370000000 HC RX 637 (ALT 250 FOR IP): Performed by: EMERGENCY MEDICINE

## 2018-11-28 PROCEDURE — 2580000003 HC RX 258: Performed by: EMERGENCY MEDICINE

## 2018-11-28 PROCEDURE — 84484 ASSAY OF TROPONIN QUANT: CPT

## 2018-11-28 PROCEDURE — G0378 HOSPITAL OBSERVATION PER HR: HCPCS

## 2018-11-28 RX ORDER — ACETAMINOPHEN 500 MG
1000 TABLET ORAL EVERY 6 HOURS PRN
Status: DISCONTINUED | OUTPATIENT
Start: 2018-11-28 | End: 2018-11-28 | Stop reason: HOSPADM

## 2018-11-28 RX ORDER — NAPROXEN 250 MG/1
250 TABLET ORAL 3 TIMES DAILY PRN
Status: DISCONTINUED | OUTPATIENT
Start: 2018-11-28 | End: 2018-11-28 | Stop reason: HOSPADM

## 2018-11-28 RX ORDER — MECLIZINE HYDROCHLORIDE 25 MG/1
25 TABLET ORAL EVERY 6 HOURS PRN
Qty: 30 TABLET | Refills: 0 | Status: SHIPPED | OUTPATIENT
Start: 2018-11-28 | End: 2018-12-08

## 2018-11-28 RX ORDER — DOCUSATE SODIUM 100 MG/1
100 CAPSULE, LIQUID FILLED ORAL DAILY
Status: DISCONTINUED | OUTPATIENT
Start: 2018-11-28 | End: 2018-11-28 | Stop reason: HOSPADM

## 2018-11-28 RX ADMIN — DESMOPRESSIN ACETATE 40 MG: 0.2 TABLET ORAL at 12:47

## 2018-11-28 RX ADMIN — ASPIRIN 81 MG: 81 TABLET, CHEWABLE ORAL at 12:47

## 2018-11-28 RX ADMIN — METOPROLOL TARTRATE 75 MG: 25 TABLET ORAL at 12:47

## 2018-11-28 RX ADMIN — HYDRALAZINE HYDROCHLORIDE 50 MG: 50 TABLET, FILM COATED ORAL at 12:47

## 2018-11-28 RX ADMIN — CITALOPRAM 20 MG: 20 TABLET, FILM COATED ORAL at 12:47

## 2018-11-28 RX ADMIN — AMLODIPINE BESYLATE 5 MG: 5 TABLET ORAL at 12:47

## 2018-11-28 RX ADMIN — MECLIZINE HYDROCHLORIDE 25 MG: 12.5 TABLET, FILM COATED ORAL at 12:48

## 2018-11-28 RX ADMIN — HYDROCHLOROTHIAZIDE 50 MG: 50 TABLET ORAL at 12:48

## 2018-11-28 RX ADMIN — Medication 5 DROP: at 12:48

## 2018-11-28 RX ADMIN — DOCUSATE SODIUM 100 MG: 100 CAPSULE, LIQUID FILLED ORAL at 12:49

## 2018-11-28 RX ADMIN — LOSARTAN POTASSIUM 100 MG: 50 TABLET, FILM COATED ORAL at 12:46

## 2018-11-28 RX ADMIN — Medication 10 ML: at 09:00

## 2018-11-28 ASSESSMENT — ENCOUNTER SYMPTOMS
SINUS PAIN: 0
NAUSEA: 1
BLOOD IN STOOL: 0
VOMITING: 1
SHORTNESS OF BREATH: 0
WHEEZING: 0
EYE REDNESS: 0
COUGH: 0
ABDOMINAL PAIN: 1
EYE PAIN: 0
DIARRHEA: 0
RHINORRHEA: 0
APNEA: 0

## 2018-11-28 ASSESSMENT — PAIN SCALES - GENERAL: PAINLEVEL_OUTOF10: 3

## 2018-11-28 NOTE — CONSULTS
non-distended, non-tender, no masses palpated, no hepatosplenomegally  MUSCULOSKELETAL:no redness, warmth, or swelling of the joints  NEUROLOGIC:  Awake, alert, oriented to name, place and time. SKIN:  no bruising or bleeding, normal skin color, texture, turgor  LE:NO EDEMA    DATA:   ECG:NSR, POOR R WAVE PROGRESSION    ECHO 9/18/17: EF 55-60%, MARTINE, mild MR/TR, RVSP is 27 mmHg. Lab:   Lab Results   Component Value Date     11/27/2018    K 4.3 11/27/2018     11/27/2018    CO2 24 11/27/2018    BUN 10 11/27/2018    CREATININE 0.50 11/27/2018    GFRAA >60 11/27/2018    LABGLOM >60 11/27/2018    GLUCOSE 173 11/27/2018    GLUCOSE 176 05/14/2012    PROT 7.7 11/27/2018    LABALBU 4.1 11/27/2018    LABALBU 4.3 05/14/2012    CALCIUM 9.6 11/27/2018    BILITOT 0.47 11/27/2018    ALKPHOS 56 11/27/2018    AST 31 11/27/2018    ALT 24 11/27/2018     Magnesium:    Lab Results   Component Value Date    MG 2.1 11/01/2016     Warfarin PT/INR:    Lab Results   Component Value Date    PROTIME 9.7 02/05/2015    PROTIME 10.4 02/02/2012    INR 0.9 02/05/2015     TSH:    Lab Results   Component Value Date    TSH 1.51 06/07/2018     BMP:    Lab Results   Component Value Date     11/27/2018    K 4.3 11/27/2018     11/27/2018    CO2 24 11/27/2018    BUN 10 11/27/2018    LABALBU 4.1 11/27/2018    LABALBU 4.3 05/14/2012    CREATININE 0.50 11/27/2018    CALCIUM 9.6 11/27/2018    GFRAA >60 11/27/2018    LABGLOM >60 11/27/2018    GLUCOSE 173 11/27/2018    GLUCOSE 176 05/14/2012     FLP:    Lab Results   Component Value Date    CHOL 116 05/01/2017    TRIG 54 05/01/2017    HDL 53 05/01/2017    LDLCHOLESTEROL 52 05/01/2017       IMPRESSION:  VERTIGO, EMESIS  ATYPICAL CHEST PAIN DOUBT ISCHEMIC ETIOLOGY  Hypertension: UNCONTROLLED  Anxiety/depression. Gastroesophageal reflux disease. Osteoarthritis. Diabetes mellitus. Obesity. Hyperlipidemia.    Mild mitral and tricuspid regurgitation ON Transthoracic echocardiogram 9/18/2017: LV ejection fraction 55-60%,  right ventricular systolic pressure 27 mmHg. Goiter  Patient Active Problem List   Diagnosis    DM2 (diabetes mellitus, type 2)    HTN (hypertension)    Depression    Uncomplicated asthma    Onychomycosis    Sinus tarsi syndrome    Multinodular goiter (nontoxic)    Obesity (BMI 30-39. 9)    Vitamin D deficiency disease    Hyperlipidemia with target LDL less than 100    Hallux limitus, bilateral     Depressive disorder, not elsewhere classified    Edema of right lower extremity    Benign positional vertigo    Dizzy         RECOMMENDATIONS:     WILL NEED BETTER BP CONTROL, ? COMPLINACE  WILL RESUME HOME MEDICATION  ASA  F/U AS OP AND CONSIDER STRESS TEST IF PAIN RECURS    Jaclyn Albrecht M.D.

## 2018-11-28 NOTE — PLAN OF CARE
Problem: Pain:  Goal: Pain level will decrease  Pain level will decrease   Outcome: Ongoing      Problem: Falls - Risk of:  Goal: Will remain free from falls  Will remain free from falls   Outcome: Ongoing    Goal: Absence of physical injury  Absence of physical injury   Outcome: Ongoing

## 2018-11-28 NOTE — DISCHARGE SUMMARY
CDU Discharge Summary      Patient:  Clary Gunter  YOB: 1955    MRN: [id number]   Acct: [de-identified]    Primary Care Physician: Dimitri Levine MD    Admit date:  11/27/2018  1:03 PM  Discharge date:  11/28/18 4:46 PM     Discharge Diagnoses:     Acute dizziness due to vertigo  Improved with PO meclizine    Follow-up:  Call today/tomorrow for a follow up appointment with Dimitri Levine MD , or return to the Emergency Room with worsening symptoms    Stressed to patient the importance of following up with primary care doctor for further workup/management of symptoms. Pt verbalizes understanding and agrees with plan. Discharge Medications:  Changes to medications          Leopoldo Sorenson   [place] Medication Instructions RDY:[id number]    Printed on:11/28/18 7970   Medication Information                      acetaminophen (TYLENOL) 325 MG tablet  Take 2 tablets by mouth every 6 hours as needed for Pain or Fever             albuterol sulfate  (90 Base) MCG/ACT inhaler  Inhale 2 puffs into the lungs every 6 hours as needed for Wheezing             Alcohol Swabs (ALCOHOL WIPES) 70 % PADS  Use up to 4 times a day when testing blood sugars             amLODIPine (NORVASC) 5 MG tablet  Take 5 mg by mouth daily             aspirin 81 MG chewable tablet  CHEW AND SWALLOW 1 TABLET BY MOUTH ONE TIME A DAY             atorvastatin (LIPITOR) 40 MG tablet  TAKE 1 TABLET BY MOUTH ONE TIME A DAY             beclomethasone (QVAR) 80 MCG/ACT inhaler  Inhale 1 puff into the lungs 2 times daily             blood glucose test strips (TRUE METRIX BLOOD GLUCOSE TEST) strip  1 each by In Vitro route 2 times daily As needed.              citalopram (CELEXA) 20 MG tablet  TAKE ONE HALF TO ONE TABLET BY MOUTH DAILY             fluticasone (FLONASE) 50 MCG/ACT nasal spray  1 spray by Each Nare route daily             glipiZIDE (GLUCOTROL) 5 MG tablet  TAKE ONE TABLET BY MOUTH TWICE A DAY             hydrALAZINE Range    POC Troponin I 0.01 0.00 - 0.10 ng/mL    POC Troponin Interp       The Troponin-I (POC) results cannot be compared to the Troponin-T results. EKG 12 Lead   Result Value Ref Range    Ventricular Rate 72 BPM    Atrial Rate 72 BPM    P-R Interval 178 ms    QRS Duration 98 ms    Q-T Interval 442 ms    QTc Calculation (Bazett) 483 ms    P Axis 64 degrees    R Axis -25 degrees    T Axis 6 degrees   EKG 12 Lead   Result Value Ref Range    Ventricular Rate 74 BPM    Atrial Rate 74 BPM    P-R Interval 184 ms    QRS Duration 92 ms    Q-T Interval 454 ms    QTc Calculation (Bazett) 503 ms    P Axis 25 degrees    R Axis -19 degrees    T Axis 14 degrees     Xr Chest Standard (2 Vw)    Result Date: 11/27/2018  EXAMINATION: TWO VIEWS OF THE CHEST 11/27/2018 5:59 pm COMPARISON: Chest radiographs 09/13/2018, 06/07/2018 HISTORY: ORDERING SYSTEM PROVIDED HISTORY: chest pain, dizzy TECHNOLOGIST PROVIDED HISTORY: chest pain, dizzy FINDINGS: Central vascular congestion. No pneumothorax or pleural effusion. No focal airspace consolidation. Cardiac silhouette is mildly enlarged. No acute osseous abnormality. Mild cardiomegaly with central vascular congestion. No overt pulmonary edema. Physical Exam:    General appearance - NAD, AOx 3   Lungs -CTAB, no R/R/R  Heart - RRR, no M/R/G  Abdomen - Soft, NT/ND  Neurological:  MAEx4, No focal motor deficit, sensory loss  Extremities - Cap refil <2 sec in all ext., no edema  Skin -warm, dry      Hospital Course:  Clinical course has improved, labs and imaging reviewed. Guero Pineda originally presented to the hospital on 11/27/2018  1:03 PM with dizziness. At that time it was determined that she required further observation and evaluation by cardiology as well as better control of dizziness. She was admitted and labs and imaging were followed daily. Imaging results as above. Cardiology only recommended better control of BP and follow up as outpatient.  Patient's

## 2018-11-28 NOTE — PROGRESS NOTES
Cardiac Testing      ECHO 9/18/17: EF 55-60%, MARTINE, mild MR/TR, RVSP is 27 mmHg.      HOLTER 11/10/16: Rare PVCs. STRESS 1/16/16: No ischemia or infarct. EF 53%.      1. Hypertension. 2. Anxiety/depression. 3. Gastroesophageal reflux disease. 4. Osteoarthritis. 5. Diabetes mellitus. 6. Obesity. 7. Hyperlipidemia. 8. Persantine Cardiolite stress test 11/2014: No ischemia or infarction. Ejection fraction 61%. 9. Transthoracic echocardiogram 9/18/2017: LV ejection fraction 55-60%, mild mitral and tricuspid regurgitation, right ventricular systolic pressure 27 mmHg. 10. Goiter. The patient has an appointment with endocrinologist in March.

## 2018-11-28 NOTE — H&P
per hospital policy. No change unless specifically mentioned in chart    PAST MEDICAL HISTORY    has a past medical history of Anxiety; Asthma; CAD (coronary artery disease); Depression; GERD (gastroesophageal reflux disease); Hyperlipidemia; Hypertension; MI (myocardial infarction) ([facility] [address] Obesity; Osteoarthritis; Sinus tarsi syndrome; Thyroid cyst; Type II or unspecified type diabetes mellitus without mention of complication, not stated as uncontrolled; Urinary incontinence; and Vertigo. I have reviewed the past medical history with the patient and it is pertinent to this complaint. SURGICAL HISTORY      has a past surgical history that includes Hysterectomy and Breast cyst excision. I have reviewed and agree with Surgical History entered and it is pertinent to this complaint. CURRENT MEDICATIONS       losartan (COZAAR) tablet 100 mg Daily   metoprolol tartrate (LOPRESSOR) tablet 75 mg Once   amLODIPine (NORVASC) tablet 5 mg Daily   atorvastatin (LIPITOR) tablet 40 mg Daily   citalopram (CELEXA) tablet 20 mg Daily   hydrochlorothiazide (HYDRODIURIL) tablet 50 mg Daily   metoprolol tartrate (LOPRESSOR) tablet 75 mg Daily   sodium chloride flush 0.9 % injection 10 mL 2 times per day   sodium chloride flush 0.9 % injection 10 mL PRN   meclizine (ANTIVERT) tablet 25 mg Q6H PRN   hydrALAZINE (APRESOLINE) tablet 50 mg 4 times per day   acetaminophen (TYLENOL) tablet 1,000 mg Q6H PRN   aspirin chewable tablet 81 mg Daily       All medication charted and reviewed. ALLERGIES     is allergic to amoxicillin and pcn [penicillins]. FAMILY HISTORY     indicated that her mother is alive. She indicated that her father is . family history includes Heart Disease in her father; High Blood Pressure in her mother; Seizures in her mother. The patient denies any pertinent family history. I have reviewed and agree with the family history entered.   I have reviewed the Family History and it is not

## 2018-11-28 NOTE — TELEPHONE ENCOUNTER
Sanjuanita Benitez from [address] is Calling to ask if you will follow patient for home care  Referral form was placed in Dr. Anaya Showers  Please advise

## 2018-11-30 NOTE — TELEPHONE ENCOUNTER
PC to [facility] Department--  and let her know that we will follow patient for home care

## 2018-12-11 DIAGNOSIS — I10 ESSENTIAL HYPERTENSION: ICD-10-CM

## 2018-12-11 DIAGNOSIS — E11.9 TYPE 2 DIABETES MELLITUS WITHOUT COMPLICATION (HCC): ICD-10-CM

## 2018-12-11 DIAGNOSIS — E78.5 HYPERLIPIDEMIA WITH TARGET LDL LESS THAN 100: ICD-10-CM

## 2018-12-11 DIAGNOSIS — R05.3 CHRONIC COUGH: ICD-10-CM

## 2018-12-11 DIAGNOSIS — J33.9 NASAL POLYP: ICD-10-CM

## 2018-12-13 RX ORDER — HYDRALAZINE HYDROCHLORIDE 50 MG/1
TABLET, FILM COATED ORAL
Qty: 120 TABLET | Refills: 0 | Status: SHIPPED | OUTPATIENT
Start: 2018-12-13 | End: 2018-12-21 | Stop reason: SDUPTHER

## 2018-12-13 RX ORDER — LORATADINE 10 MG/1
TABLET ORAL
Qty: 30 TABLET | Refills: 0 | Status: SHIPPED | OUTPATIENT
Start: 2018-12-13 | End: 2019-01-04 | Stop reason: SDUPTHER

## 2018-12-13 RX ORDER — ASPIRIN 81 MG/1
TABLET, CHEWABLE ORAL
Qty: 30 TABLET | Refills: 0 | Status: SHIPPED | OUTPATIENT
Start: 2018-12-13 | End: 2018-12-21 | Stop reason: SDUPTHER

## 2018-12-13 RX ORDER — LOSARTAN POTASSIUM 100 MG/1
TABLET ORAL
Qty: 28 TABLET | Refills: 1 | Status: SHIPPED | OUTPATIENT
Start: 2018-12-13 | End: 2018-12-21 | Stop reason: SDUPTHER

## 2018-12-13 RX ORDER — GLIPIZIDE 5 MG/1
TABLET ORAL
Qty: 60 TABLET | Refills: 0 | Status: SHIPPED | OUTPATIENT
Start: 2018-12-13 | End: 2018-12-21 | Stop reason: SDUPTHER

## 2018-12-13 RX ORDER — ATORVASTATIN CALCIUM 40 MG/1
TABLET, FILM COATED ORAL
Qty: 30 TABLET | Refills: 0 | Status: SHIPPED | OUTPATIENT
Start: 2018-12-13 | End: 2018-12-21 | Stop reason: SDUPTHER

## 2018-12-14 ENCOUNTER — HOSPITAL ENCOUNTER (EMERGENCY)
Age: 63
Discharge: HOME | End: 2018-12-14
Attending: EMERGENCY MEDICINE
Payer: MEDICARE

## 2018-12-14 VITALS
WEIGHT: 265 LBS | RESPIRATION RATE: 14 BRPM | HEART RATE: 83 BPM | DIASTOLIC BLOOD PRESSURE: 81 MMHG | OXYGEN SATURATION: 100 % | BODY MASS INDEX: 45.49 KG/M2 | SYSTOLIC BLOOD PRESSURE: 150 MMHG | TEMPERATURE: 98.7 F

## 2018-12-14 DIAGNOSIS — R42 VERTIGO: Primary | ICD-10-CM

## 2018-12-14 LAB
ABSOLUTE EOS #: 0.26 K/UL (ref 0–0.44)
ABSOLUTE IMMATURE GRANULOCYTE: <0.03 K/UL (ref 0–0.3)
ABSOLUTE LYMPH #: 2.04 K/UL (ref 1.1–3.7)
ABSOLUTE MONO #: 0.77 K/UL (ref 0.1–1.2)
ANION GAP SERPL CALCULATED.3IONS-SCNC: 13 MMOL/L (ref 9–17)
BASOPHILS # BLD: 1 % (ref 0–2)
BASOPHILS ABSOLUTE: 0.06 K/UL (ref 0–0.2)
BUN BLDV-MCNC: 13 MG/DL (ref 8–23)
BUN/CREAT BLD: ABNORMAL (ref 9–20)
CALCIUM SERPL-MCNC: 9.2 MG/DL (ref 8.6–10.4)
CHLORIDE BLD-SCNC: 105 MMOL/L (ref 98–107)
CO2: 22 MMOL/L (ref 20–31)
CREAT SERPL-MCNC: 0.56 MG/DL (ref 0.5–0.9)
DIFFERENTIAL TYPE: ABNORMAL
EKG ATRIAL RATE: 73 BPM
EKG P AXIS: 37 DEGREES
EKG P-R INTERVAL: 190 MS
EKG Q-T INTERVAL: 426 MS
EKG QRS DURATION: 94 MS
EKG QTC CALCULATION (BAZETT): 469 MS
EKG R AXIS: -24 DEGREES
EKG T AXIS: 11 DEGREES
EKG VENTRICULAR RATE: 73 BPM
EOSINOPHILS RELATIVE PERCENT: 4 % (ref 1–4)
GFR AFRICAN AMERICAN: >60 ML/MIN
GFR NON-AFRICAN AMERICAN: >60 ML/MIN
GFR SERPL CREATININE-BSD FRML MDRD: ABNORMAL ML/MIN/{1.73_M2}
GFR SERPL CREATININE-BSD FRML MDRD: ABNORMAL ML/MIN/{1.73_M2}
GLUCOSE BLD-MCNC: 109 MG/DL (ref 70–99)
HCT VFR BLD CALC: 35.1 % (ref 36.3–47.1)
HEMOGLOBIN: 11.7 G/DL (ref 11.9–15.1)
IMMATURE GRANULOCYTES: 0 %
LYMPHOCYTES # BLD: 32 % (ref 24–43)
MCH RBC QN AUTO: 27.4 PG (ref 25.2–33.5)
MCHC RBC AUTO-ENTMCNC: 33.3 G/DL (ref 28.4–34.8)
MCV RBC AUTO: 82.2 FL (ref 82.6–102.9)
MONOCYTES # BLD: 12 % (ref 3–12)
NRBC AUTOMATED: 0 PER 100 WBC
PDW BLD-RTO: 17.3 % (ref 11.8–14.4)
PLATELET # BLD: 227 K/UL (ref 138–453)
PLATELET ESTIMATE: ABNORMAL
PMV BLD AUTO: 12.1 FL (ref 8.1–13.5)
POC TROPONIN I: 0 NG/ML (ref 0–0.1)
POC TROPONIN INTERP: NORMAL
POTASSIUM SERPL-SCNC: 3.6 MMOL/L (ref 3.7–5.3)
RBC # BLD: 4.27 M/UL (ref 3.95–5.11)
RBC # BLD: ABNORMAL 10*6/UL
SEG NEUTROPHILS: 51 % (ref 36–65)
SEGMENTED NEUTROPHILS ABSOLUTE COUNT: 3.29 K/UL (ref 1.5–8.1)
SODIUM BLD-SCNC: 140 MMOL/L (ref 135–144)
WBC # BLD: 6.4 K/UL (ref 3.5–11.3)
WBC # BLD: ABNORMAL 10*3/UL

## 2018-12-14 PROCEDURE — 99284 EMERGENCY DEPT VISIT MOD MDM: CPT

## 2018-12-14 PROCEDURE — 85025 COMPLETE CBC W/AUTO DIFF WBC: CPT

## 2018-12-14 PROCEDURE — 80048 BASIC METABOLIC PNL TOTAL CA: CPT

## 2018-12-14 PROCEDURE — 6370000000 HC RX 637 (ALT 250 FOR IP): Performed by: EMERGENCY MEDICINE

## 2018-12-14 PROCEDURE — 84484 ASSAY OF TROPONIN QUANT: CPT

## 2018-12-14 PROCEDURE — 93005 ELECTROCARDIOGRAM TRACING: CPT

## 2018-12-14 RX ORDER — DIAZEPAM 5 MG/1
5 TABLET ORAL ONCE
Status: COMPLETED | OUTPATIENT
Start: 2018-12-14 | End: 2018-12-14

## 2018-12-14 RX ADMIN — DIAZEPAM 5 MG: 5 TABLET ORAL at 00:30

## 2018-12-14 ASSESSMENT — ENCOUNTER SYMPTOMS
SORE THROAT: 0
NAUSEA: 1
DIARRHEA: 0
COUGH: 0
EYE PAIN: 0
EYE DISCHARGE: 0
VOMITING: 0
ABDOMINAL PAIN: 0
SHORTNESS OF BREATH: 0

## 2018-12-14 NOTE — ED PROVIDER NOTES
[facility]     Emergency Department     Faculty Attestation    I performed a history and physical examination of the patient and discussed management with the resident. I have reviewed and agree with the residents findings including all diagnostic interpretations, and treatment plans as written. Any areas of disagreement are noted on the chart. I was personally present for the key portions of any procedures. I have documented in the chart those procedures where I was not present during the key portions. I have reviewed the emergency nurses triage note. I agree with the chief complaint, past medical history, past surgical history, allergies, medications, social and family history as documented unless otherwise noted below. Documentation of the HPI, Physical Exam and Medical Decision Making performed by scribblake is based on my personal performance of the HPI, PE and MDM. For Physician Assistant/ Nurse Practitioner cases/documentation I have personally evaluated this patient and have completed at least one if not all key elements of the E/M (history, physical exam, and MDM). Additional findings are as noted. 67 yo F c/o positional vertigo, no cp no vomit no injury hx of positional vertigo, no tinnitus, no syncope,   pe vss leon eomi, no pronator drift,     Exam stable, eval stable, pt talkative ambulatory tolerating liquids,     Pre-hypertension/Hypertension: The patient has been informed that they may have pre-hypertension or Hypertension based on a blood pressure reading in the emergency department. I recommend that the patient call the primary care provider listed on their discharge instructions or a physician of their choice this week to arrange follow up for further evaluation of possible pre-hypertension or Hypertension.       EKG Interpretation    Interpreted by me NSR heart rate 73, lvh, L axis, no st elevation, qtc 469      CRITICAL CARE: There was a high probability of clinically significant/life threatening deterioration in this patient's condition which required my urgent intervention. Total critical care time was 0  minutes. This excludes any time for separately reportable procedures.        [facility], DO  12/14/18 4[facility], DO  12/14/18 4[facility],   12/14/18 6271

## 2018-12-14 NOTE — ED PROVIDER NOTES
Other Topics Concern    Not on file     Social History Narrative    No narrative on file       Family History   Problem Relation Age of Onset    High Blood Pressure Mother     Seizures Mother     Heart Disease Father        Allergies:  Amoxicillin and Pcn [penicillins]    Home Medications:  Prior to Admission medications    Medication Sig Start Date End Date Taking? Authorizing Provider   CLEAR-ATADINE 10 MG tablet TAKE 1 TABLET BY MOUTH ONE TIME A DAY 12/13/18   Baldo Major MD   atorvastatin (LIPITOR) 40 MG tablet TAKE 1 TABLET BY MOUTH ONE TIME A DAY 12/13/18   Baldo Major MD   glipiZIDE (GLUCOTROL) 5 MG tablet TAKE 1 TABLET BY MOUTH 2 TIMES A DAY 12/13/18   Baldo Major MD   losartan (COZAAR) 100 MG tablet TAKE 1 TABLET BY MOUTH ONE TIME A DAY 12/13/18   Baldo Major MD   aspirin 81 MG chewable tablet CHEW AND SWALLOW 1 TABLET BY MOUTH ONE TIME A DAY 12/13/18   Baldo Major MD   hydrALAZINE (APRESOLINE) 50 MG tablet TAKE 1 TABLET BY MOUTH 4 TIMES A DAY 12/13/18   Baldo Major MD   nystatin (MYCOSTATIN) 030298 UNIT/GM cream Apply topically 2 times daily. 10/29/18   [facility], PAB[facility]ana[facility]   fluticasone (FLONASE) 50 MCG/ACT nasal spray 1 spray by Each Nare route daily 10/2/18   Davion Cutler MD   albuterol sulfate  (90 Base) MCG/ACT inhaler Inhale 2 puffs into the lungs every 6 hours as needed for Wheezing 10/2/18   Davion Cutler MD   beclomethasone (QVAR) 80 MCG/ACT inhaler Inhale 1 puff into the lungs 2 times daily 10/2/18   Davion Cutler MD   blood glucose test strips (TRUE METRIX BLOOD GLUCOSE TEST) strip 1 each by In Vitro route 2 times daily As needed.  9/20/18   Baldo Major MD   citalopram (CELEXA) 20 MG tablet TAKE ONE HALF TO ONE TABLET BY MOUTH DAILY 9/14/18   Baldo Major MD   hydrALAZINE (APRESOLINE) 25 MG tablet TAKE ONE TABLET BY MOUTH FOUR TIMES A DAY ALONG WITH 50MG TABLET 9/14/18   Baldo Major MD   vitamin D (ERGOCALCIFEROL) 80848 units CAPS capsule Take 1 capsule DO  Resident  12/14/18 6208

## 2018-12-21 ENCOUNTER — OFFICE VISIT (OUTPATIENT)
Dept: INTERNAL MEDICINE | Age: 63
End: 2018-12-21
Payer: MEDICARE

## 2018-12-21 VITALS
BODY MASS INDEX: 45.49 KG/M2 | HEART RATE: 88 BPM | SYSTOLIC BLOOD PRESSURE: 125 MMHG | WEIGHT: 265 LBS | DIASTOLIC BLOOD PRESSURE: 73 MMHG

## 2018-12-21 DIAGNOSIS — E78.5 HYPERLIPIDEMIA WITH TARGET LDL LESS THAN 100: ICD-10-CM

## 2018-12-21 DIAGNOSIS — E55.9 VITAMIN D DEFICIENCY DISEASE: ICD-10-CM

## 2018-12-21 DIAGNOSIS — J45.20 INTERMITTENT ASTHMA WITHOUT COMPLICATION, UNSPECIFIED ASTHMA SEVERITY: ICD-10-CM

## 2018-12-21 DIAGNOSIS — Z79.4 TYPE 2 DIABETES MELLITUS WITHOUT COMPLICATION, WITH LONG-TERM CURRENT USE OF INSULIN (HCC): ICD-10-CM

## 2018-12-21 DIAGNOSIS — H81.10 BENIGN PAROXYSMAL POSITIONAL VERTIGO, UNSPECIFIED LATERALITY: ICD-10-CM

## 2018-12-21 DIAGNOSIS — E11.9 TYPE 2 DIABETES MELLITUS WITHOUT COMPLICATION, WITHOUT LONG-TERM CURRENT USE OF INSULIN (HCC): ICD-10-CM

## 2018-12-21 DIAGNOSIS — R42 DIZZINESS: Primary | ICD-10-CM

## 2018-12-21 DIAGNOSIS — J45.30 MILD PERSISTENT ASTHMA WITHOUT COMPLICATION: ICD-10-CM

## 2018-12-21 DIAGNOSIS — Z23 NEED FOR PROPHYLACTIC VACCINATION AND INOCULATION AGAINST VARICELLA: ICD-10-CM

## 2018-12-21 DIAGNOSIS — E11.9 TYPE 2 DIABETES MELLITUS WITHOUT COMPLICATION, WITH LONG-TERM CURRENT USE OF INSULIN (HCC): ICD-10-CM

## 2018-12-21 DIAGNOSIS — I10 ESSENTIAL HYPERTENSION: ICD-10-CM

## 2018-12-21 DIAGNOSIS — Z13.31 POSITIVE DEPRESSION SCREENING: ICD-10-CM

## 2018-12-21 DIAGNOSIS — K21.9 GASTROESOPHAGEAL REFLUX DISEASE WITHOUT ESOPHAGITIS: ICD-10-CM

## 2018-12-21 DIAGNOSIS — Z12.11 SCREENING FOR COLON CANCER: ICD-10-CM

## 2018-12-21 PROCEDURE — 1036F TOBACCO NON-USER: CPT | Performed by: STUDENT IN AN ORGANIZED HEALTH CARE EDUCATION/TRAINING PROGRAM

## 2018-12-21 PROCEDURE — 3017F COLORECTAL CA SCREEN DOC REV: CPT | Performed by: STUDENT IN AN ORGANIZED HEALTH CARE EDUCATION/TRAINING PROGRAM

## 2018-12-21 PROCEDURE — 3045F PR MOST RECENT HEMOGLOBIN A1C LEVEL 7.0-9.0%: CPT | Performed by: STUDENT IN AN ORGANIZED HEALTH CARE EDUCATION/TRAINING PROGRAM

## 2018-12-21 PROCEDURE — 2022F DILAT RTA XM EVC RTNOPTHY: CPT | Performed by: STUDENT IN AN ORGANIZED HEALTH CARE EDUCATION/TRAINING PROGRAM

## 2018-12-21 PROCEDURE — 99211 OFF/OP EST MAY X REQ PHY/QHP: CPT | Performed by: INTERNAL MEDICINE

## 2018-12-21 PROCEDURE — 99214 OFFICE O/P EST MOD 30 MIN: CPT | Performed by: STUDENT IN AN ORGANIZED HEALTH CARE EDUCATION/TRAINING PROGRAM

## 2018-12-21 PROCEDURE — G8427 DOCREV CUR MEDS BY ELIG CLIN: HCPCS | Performed by: STUDENT IN AN ORGANIZED HEALTH CARE EDUCATION/TRAINING PROGRAM

## 2018-12-21 PROCEDURE — G8482 FLU IMMUNIZE ORDER/ADMIN: HCPCS | Performed by: STUDENT IN AN ORGANIZED HEALTH CARE EDUCATION/TRAINING PROGRAM

## 2018-12-21 PROCEDURE — G8417 CALC BMI ABV UP PARAM F/U: HCPCS | Performed by: STUDENT IN AN ORGANIZED HEALTH CARE EDUCATION/TRAINING PROGRAM

## 2018-12-21 RX ORDER — GLIPIZIDE 5 MG/1
TABLET ORAL
Qty: 60 TABLET | Refills: 0 | Status: SHIPPED | OUTPATIENT
Start: 2018-12-21 | End: 2019-01-18

## 2018-12-21 RX ORDER — ASPIRIN 81 MG/1
TABLET, CHEWABLE ORAL
Qty: 30 TABLET | Refills: 0 | Status: SHIPPED | OUTPATIENT
Start: 2018-12-21 | End: 2019-01-07 | Stop reason: ALTCHOICE

## 2018-12-21 RX ORDER — HYDRALAZINE HYDROCHLORIDE 25 MG/1
TABLET, FILM COATED ORAL
Qty: 120 TABLET | Refills: 3 | Status: SHIPPED | OUTPATIENT
Start: 2018-12-21 | End: 2019-01-04 | Stop reason: SDUPTHER

## 2018-12-21 RX ORDER — LOSARTAN POTASSIUM 100 MG/1
TABLET ORAL
Qty: 28 TABLET | Refills: 1 | Status: SHIPPED | OUTPATIENT
Start: 2018-12-21 | End: 2019-02-05 | Stop reason: ALTCHOICE

## 2018-12-21 RX ORDER — HYDROCHLOROTHIAZIDE 50 MG/1
50 TABLET ORAL DAILY
Qty: 30 TABLET | Refills: 3 | Status: SHIPPED | OUTPATIENT
Start: 2018-12-21 | End: 2019-01-04 | Stop reason: SDUPTHER

## 2018-12-21 RX ORDER — ALBUTEROL SULFATE 90 UG/1
2 AEROSOL, METERED RESPIRATORY (INHALATION) EVERY 6 HOURS PRN
Qty: 1 INHALER | Refills: 3 | Status: SHIPPED | OUTPATIENT
Start: 2018-12-21 | End: 2019-05-24 | Stop reason: SDUPTHER

## 2018-12-21 RX ORDER — AMLODIPINE BESYLATE 5 MG/1
5 TABLET ORAL DAILY
Qty: 30 TABLET | Refills: 3 | Status: SHIPPED | OUTPATIENT
Start: 2018-12-21 | End: 2019-05-24 | Stop reason: SDUPTHER

## 2018-12-21 RX ORDER — OMEPRAZOLE 20 MG/1
CAPSULE, DELAYED RELEASE ORAL
Qty: 60 CAPSULE | Refills: 3 | Status: SHIPPED | OUTPATIENT
Start: 2018-12-21 | End: 2019-05-24 | Stop reason: SDUPTHER

## 2018-12-21 RX ORDER — CITALOPRAM 20 MG/1
TABLET ORAL
Qty: 30 TABLET | Refills: 3 | Status: SHIPPED | OUTPATIENT
Start: 2018-12-21 | End: 2019-01-04 | Stop reason: SDUPTHER

## 2018-12-21 RX ORDER — LANCETS 30 GAUGE
1 EACH MISCELLANEOUS DAILY
Qty: 100 EACH | Refills: 11 | Status: SHIPPED | OUTPATIENT
Start: 2018-12-21

## 2018-12-21 RX ORDER — ISOPROPYL ALCOHOL 0.7 ML/1
SWAB TOPICAL
Qty: 100 EACH | Refills: 5 | Status: SHIPPED | OUTPATIENT
Start: 2018-12-21

## 2018-12-21 RX ORDER — HYDRALAZINE HYDROCHLORIDE 50 MG/1
TABLET, FILM COATED ORAL
Qty: 120 TABLET | Refills: 0 | Status: SHIPPED | OUTPATIENT
Start: 2018-12-21 | End: 2019-01-18

## 2018-12-21 RX ORDER — METOPROLOL TARTRATE 50 MG/1
TABLET, FILM COATED ORAL
Qty: 90 TABLET | Refills: 4 | Status: SHIPPED | OUTPATIENT
Start: 2018-12-21 | End: 2019-05-24 | Stop reason: SDUPTHER

## 2018-12-21 RX ORDER — ERGOCALCIFEROL 1.25 MG/1
50000 CAPSULE ORAL WEEKLY
Qty: 4 CAPSULE | Refills: 5 | Status: SHIPPED | OUTPATIENT
Start: 2018-12-21 | End: 2019-09-26 | Stop reason: SDUPTHER

## 2018-12-21 RX ORDER — MECLIZINE HYDROCHLORIDE 25 MG/1
25 TABLET ORAL 3 TIMES DAILY PRN
Qty: 30 TABLET | Refills: 0 | Status: SHIPPED | OUTPATIENT
Start: 2018-12-21 | End: 2018-12-31

## 2018-12-21 RX ORDER — M-VIT,TX,IRON,MINS/CALC/FOLIC 27MG-0.4MG
1 TABLET ORAL DAILY
Qty: 30 TABLET | Refills: 11 | Status: SHIPPED | OUTPATIENT
Start: 2018-12-21

## 2018-12-21 RX ORDER — ATORVASTATIN CALCIUM 40 MG/1
TABLET, FILM COATED ORAL
Qty: 30 TABLET | Refills: 0 | Status: SHIPPED | OUTPATIENT
Start: 2018-12-21 | End: 2019-01-07 | Stop reason: ALTCHOICE

## 2018-12-21 NOTE — PROGRESS NOTES
Attending Physician Statement GC  I have discussed the care of Daniel Schmitt, including pertinent history and exam findings with the resident. I have reviewed the key elements of all parts of the encounter with the resident. I have seen and examined the patient with the resident and the key elements of all parts of the encounter have been performed by me.      Patient is here to follow up on  Hospitalization for Dizziness while lying down, was associated with vomiting  Was discharged on Meclizine  Had another ER visit for the same  Patient continues to be symptomatic  Will order MRI brain and refer to ENT    Htn-BP well controlled on   Losartan 100 mg, Hydralazine 50 mg, Norvasc 5 mg, HCTZ 5o mg, Metoprolol 50 mg  On ASA and Lipitor 40 mg    DM-A1C 7.1 08/18 on Glipizide 5 mg    Asthma-stable on Qvar, albuterol  She has never been a smoker      MNG, neg TSH- 06/18      Follow up in 1 month    Natalee Farah MD

## 2018-12-21 NOTE — PROGRESS NOTES
limitus, bilateral     Depressive disorder, not elsewhere classified    Edema of right lower extremity    Benign positional vertigo    Dizzy       Health Maintenance Due   Topic Date Due    Shingles Vaccine (1 of 2 - 2 Dose Series) 10/26/2007    Colon Cancer Screen FIT/FOBT  08/19/2017    Cervical cancer screen  10/24/2017    Lipid screen  05/01/2018       Allergies   Allergen Reactions    Amoxicillin     Pcn [Penicillins] Hives and Itching         Current Outpatient Prescriptions   Medication Sig Dispense Refill    CLEAR-ATADINE 10 MG tablet TAKE 1 TABLET BY MOUTH ONE TIME A DAY 30 tablet 0    atorvastatin (LIPITOR) 40 MG tablet TAKE 1 TABLET BY MOUTH ONE TIME A DAY 30 tablet 0    glipiZIDE (GLUCOTROL) 5 MG tablet TAKE 1 TABLET BY MOUTH 2 TIMES A DAY 60 tablet 0    losartan (COZAAR) 100 MG tablet TAKE 1 TABLET BY MOUTH ONE TIME A DAY 28 tablet 1    aspirin 81 MG chewable tablet CHEW AND SWALLOW 1 TABLET BY MOUTH ONE TIME A DAY 30 tablet 0    hydrALAZINE (APRESOLINE) 50 MG tablet TAKE 1 TABLET BY MOUTH 4 TIMES A  tablet 0    nystatin (MYCOSTATIN) 018710 UNIT/GM cream Apply topically 2 times daily. 1 Tube 0    fluticasone (FLONASE) 50 MCG/ACT nasal spray 1 spray by Each Nare route daily 1 Bottle 0    albuterol sulfate  (90 Base) MCG/ACT inhaler Inhale 2 puffs into the lungs every 6 hours as needed for Wheezing 1 Inhaler 3    beclomethasone (QVAR) 80 MCG/ACT inhaler Inhale 1 puff into the lungs 2 times daily 1 Inhaler 3    blood glucose test strips (TRUE METRIX BLOOD GLUCOSE TEST) strip 1 each by In Vitro route 2 times daily As needed.  100 each 5    citalopram (CELEXA) 20 MG tablet TAKE ONE HALF TO ONE TABLET BY MOUTH DAILY 30 tablet 3    hydrALAZINE (APRESOLINE) 25 MG tablet TAKE ONE TABLET BY MOUTH FOUR TIMES A DAY ALONG WITH 50MG TABLET 120 tablet 3    vitamin D (ERGOCALCIFEROL) 84163 units CAPS capsule Take 1 capsule by mouth once a week 4 capsule 5    metoprolol tartrate (LOPRESSOR) 50 MG tablet TAKE 1 & 1/2 TABLETS BY MOUTH TWICE DAILY 90 tablet 4    hydrochlorothiazide (HYDRODIURIL) 50 MG tablet Take 50 mg by mouth daily      Lancets MISC 1 each by Does not apply route daily Use 4 times daily Diagnisis:250.0  Diabetes Mellitus 100 each 11    Alcohol Swabs (ALCOHOL WIPES) 70 % PADS Use up to 4 times a day when testing blood sugars 100 each 5    omeprazole (PRILOSEC) 20 MG delayed release capsule TAKE ONE CAPSULE BY MOUTH TWICE A DAY 60 capsule 3    acetaminophen (TYLENOL) 325 MG tablet Take 2 tablets by mouth every 6 hours as needed for Pain or Fever 40 tablet 0    amLODIPine (NORVASC) 5 MG tablet Take 5 mg by mouth daily      saxagliptin (ONGLYZA) 5 MG TABS tablet Take 1 tablet by mouth daily 30 tablet 3    Multiple Vitamins-Minerals (THERAPEUTIC MULTIVITAMIN-MINERALS) tablet Take 1 tablet by mouth daily 30 tablet 11     No current facility-administered medications for this visit. Social History   Substance Use Topics    Smoking status: Never Smoker    Smokeless tobacco: Never Used    Alcohol use No       Family History   Problem Relation Age of Onset    High Blood Pressure Mother     Seizures Mother     Heart Disease Father         REVIEW OF SYSTEMS:  Review of Systems  · Constitutional: Negative for Fever, chills  · Eyes: Negative for visual changes, diplopia  · ENT: Negative for mouth sores, sore throat.,  positive before vertigo and dizziness spinning-like sensations  · Cardiovascular: Negative for lightheadedness ,chest pain, palpitations   · Respiratory:Negative for Shortness of breath,cough or wheezing. · Gastrointestinal: Negative for nausea/vomiting, change in bowel habits, abdominal pain  · Genitourinary:Negative for change in bladder habits, dysuria, hematuria.   · Musculoskeletal: Negative for joint pain   · Neurological: Negative for headache, change in muscle strength numbness/tingling  · Psychiatric: negative for change in mood,

## 2018-12-31 ENCOUNTER — HOSPITAL ENCOUNTER (OUTPATIENT)
Dept: MRI IMAGING | Age: 63
Discharge: HOME | End: 2019-01-02
Payer: MEDICARE

## 2018-12-31 DIAGNOSIS — R42 DIZZINESS: ICD-10-CM

## 2018-12-31 PROCEDURE — 70551 MRI BRAIN STEM W/O DYE: CPT

## 2019-01-04 DIAGNOSIS — E11.9 TYPE 2 DIABETES MELLITUS WITHOUT COMPLICATION (HCC): ICD-10-CM

## 2019-01-04 DIAGNOSIS — I10 ESSENTIAL HYPERTENSION: ICD-10-CM

## 2019-01-04 DIAGNOSIS — R05.3 CHRONIC COUGH: ICD-10-CM

## 2019-01-04 DIAGNOSIS — J33.9 NASAL POLYP: ICD-10-CM

## 2019-01-04 DIAGNOSIS — Z13.31 POSITIVE DEPRESSION SCREENING: ICD-10-CM

## 2019-01-04 DIAGNOSIS — E78.5 HYPERLIPIDEMIA WITH TARGET LDL LESS THAN 100: ICD-10-CM

## 2019-01-07 RX ORDER — HYDRALAZINE HYDROCHLORIDE 50 MG/1
TABLET, FILM COATED ORAL
Qty: 120 TABLET | Refills: 0 | Status: SHIPPED | OUTPATIENT
Start: 2019-01-07 | End: 2019-02-04 | Stop reason: SDUPTHER

## 2019-01-07 RX ORDER — LORATADINE 10 MG/1
TABLET ORAL
Qty: 30 TABLET | Refills: 0 | Status: SHIPPED | OUTPATIENT
Start: 2019-01-07 | End: 2019-02-04 | Stop reason: SDUPTHER

## 2019-01-07 RX ORDER — ATORVASTATIN CALCIUM 40 MG/1
TABLET, FILM COATED ORAL
Qty: 30 TABLET | Refills: 0 | Status: SHIPPED | OUTPATIENT
Start: 2019-01-07 | End: 2019-02-04 | Stop reason: SDUPTHER

## 2019-01-07 RX ORDER — GLIPIZIDE 5 MG/1
TABLET ORAL
Qty: 60 TABLET | Refills: 0 | Status: SHIPPED | OUTPATIENT
Start: 2019-01-07 | End: 2019-02-04 | Stop reason: SDUPTHER

## 2019-01-07 RX ORDER — ASPIRIN 81 MG
TABLET,CHEWABLE ORAL
Qty: 30 TABLET | Refills: 0 | Status: SHIPPED | OUTPATIENT
Start: 2019-01-07 | End: 2019-02-04 | Stop reason: SDUPTHER

## 2019-01-07 RX ORDER — CITALOPRAM 20 MG/1
TABLET ORAL
Qty: 28 TABLET | Refills: 2 | Status: SHIPPED | OUTPATIENT
Start: 2019-01-07 | End: 2019-03-27 | Stop reason: SDUPTHER

## 2019-01-07 RX ORDER — HYDRALAZINE HYDROCHLORIDE 25 MG/1
TABLET, FILM COATED ORAL
Qty: 112 TABLET | Refills: 2 | Status: SHIPPED | OUTPATIENT
Start: 2019-01-07 | End: 2019-02-05 | Stop reason: ALTCHOICE

## 2019-01-07 RX ORDER — HYDROCHLOROTHIAZIDE 50 MG/1
TABLET ORAL
Qty: 30 TABLET | Refills: 2 | Status: SHIPPED | OUTPATIENT
Start: 2019-01-07 | End: 2019-03-27 | Stop reason: SDUPTHER

## 2019-01-18 ENCOUNTER — OFFICE VISIT (OUTPATIENT)
Dept: INTERNAL MEDICINE | Age: 64
End: 2019-01-18
Payer: MEDICARE

## 2019-01-18 VITALS
WEIGHT: 266 LBS | HEIGHT: 64 IN | BODY MASS INDEX: 45.41 KG/M2 | SYSTOLIC BLOOD PRESSURE: 121 MMHG | HEART RATE: 86 BPM | DIASTOLIC BLOOD PRESSURE: 61 MMHG

## 2019-01-18 DIAGNOSIS — Z12.11 SCREENING FOR COLON CANCER: ICD-10-CM

## 2019-01-18 DIAGNOSIS — Z23 NEED FOR PROPHYLACTIC VACCINATION AND INOCULATION AGAINST VARICELLA: ICD-10-CM

## 2019-01-18 DIAGNOSIS — Z13.220 SCREENING FOR HYPERLIPIDEMIA: ICD-10-CM

## 2019-01-18 DIAGNOSIS — R84.9 ABNORMAL RESPIRATORY LABORATORY RESULTS: ICD-10-CM

## 2019-01-18 DIAGNOSIS — E11.9 TYPE 2 DIABETES MELLITUS WITHOUT COMPLICATION, WITH LONG-TERM CURRENT USE OF INSULIN (HCC): Primary | ICD-10-CM

## 2019-01-18 DIAGNOSIS — Z79.4 TYPE 2 DIABETES MELLITUS WITHOUT COMPLICATION, WITH LONG-TERM CURRENT USE OF INSULIN (HCC): Primary | ICD-10-CM

## 2019-01-18 DIAGNOSIS — H61.21 HEARING LOSS DUE TO CERUMEN IMPACTION, RIGHT: ICD-10-CM

## 2019-01-18 PROCEDURE — 3017F COLORECTAL CA SCREEN DOC REV: CPT | Performed by: INTERNAL MEDICINE

## 2019-01-18 PROCEDURE — 2022F DILAT RTA XM EVC RTNOPTHY: CPT | Performed by: INTERNAL MEDICINE

## 2019-01-18 PROCEDURE — G8482 FLU IMMUNIZE ORDER/ADMIN: HCPCS | Performed by: INTERNAL MEDICINE

## 2019-01-18 PROCEDURE — G8417 CALC BMI ABV UP PARAM F/U: HCPCS | Performed by: INTERNAL MEDICINE

## 2019-01-18 PROCEDURE — 1036F TOBACCO NON-USER: CPT | Performed by: INTERNAL MEDICINE

## 2019-01-18 PROCEDURE — 99213 OFFICE O/P EST LOW 20 MIN: CPT | Performed by: INTERNAL MEDICINE

## 2019-01-18 PROCEDURE — 99211 OFF/OP EST MAY X REQ PHY/QHP: CPT | Performed by: INTERNAL MEDICINE

## 2019-01-18 PROCEDURE — 3046F HEMOGLOBIN A1C LEVEL >9.0%: CPT | Performed by: INTERNAL MEDICINE

## 2019-01-18 PROCEDURE — G8427 DOCREV CUR MEDS BY ELIG CLIN: HCPCS | Performed by: INTERNAL MEDICINE

## 2019-01-18 ASSESSMENT — ENCOUNTER SYMPTOMS
CHEST TIGHTNESS: 0
SHORTNESS OF BREATH: 0
ABDOMINAL PAIN: 0

## 2019-01-31 ENCOUNTER — HOSPITAL ENCOUNTER (EMERGENCY)
Age: 64
Discharge: HOME | End: 2019-01-31
Attending: EMERGENCY MEDICINE
Payer: MEDICARE

## 2019-01-31 VITALS
HEART RATE: 91 BPM | RESPIRATION RATE: 17 BRPM | BODY MASS INDEX: 45.32 KG/M2 | SYSTOLIC BLOOD PRESSURE: 145 MMHG | WEIGHT: 264 LBS | OXYGEN SATURATION: 96 % | TEMPERATURE: 98.4 F | DIASTOLIC BLOOD PRESSURE: 82 MMHG

## 2019-01-31 DIAGNOSIS — M79.604 PAIN OF RIGHT LOWER EXTREMITY: Primary | ICD-10-CM

## 2019-01-31 PROCEDURE — 99283 EMERGENCY DEPT VISIT LOW MDM: CPT

## 2019-01-31 PROCEDURE — 93971 EXTREMITY STUDY: CPT

## 2019-01-31 PROCEDURE — 6370000000 HC RX 637 (ALT 250 FOR IP): Performed by: EMERGENCY MEDICINE

## 2019-01-31 RX ORDER — ACETAMINOPHEN 325 MG/1
650 TABLET ORAL ONCE
Status: COMPLETED | OUTPATIENT
Start: 2019-01-31 | End: 2019-01-31

## 2019-01-31 RX ORDER — CYCLOBENZAPRINE HCL 10 MG
10 TABLET ORAL ONCE
Status: COMPLETED | OUTPATIENT
Start: 2019-01-31 | End: 2019-01-31

## 2019-01-31 RX ORDER — CYCLOBENZAPRINE HCL 5 MG
5 TABLET ORAL 2 TIMES DAILY PRN
Qty: 12 TABLET | Refills: 0 | Status: SHIPPED | OUTPATIENT
Start: 2019-01-31 | End: 2019-02-10

## 2019-01-31 RX ORDER — ACETAMINOPHEN 325 MG/1
650 TABLET ORAL EVERY 6 HOURS PRN
Qty: 40 TABLET | Refills: 3 | Status: SHIPPED | OUTPATIENT
Start: 2019-01-31 | End: 2019-09-26

## 2019-01-31 RX ADMIN — ACETAMINOPHEN 650 MG: 325 TABLET ORAL at 15:18

## 2019-01-31 RX ADMIN — CYCLOBENZAPRINE HYDROCHLORIDE 10 MG: 10 TABLET, FILM COATED ORAL at 15:18

## 2019-01-31 ASSESSMENT — PAIN DESCRIPTION - ORIENTATION: ORIENTATION: RIGHT

## 2019-01-31 ASSESSMENT — ENCOUNTER SYMPTOMS
SHORTNESS OF BREATH: 0
CONSTIPATION: 0
COUGH: 0
NAUSEA: 0
VOMITING: 0
RHINORRHEA: 0
DIARRHEA: 0
EYE PAIN: 0
ABDOMINAL PAIN: 0

## 2019-01-31 ASSESSMENT — PAIN SCALES - GENERAL: PAINLEVEL_OUTOF10: 8

## 2019-01-31 ASSESSMENT — PAIN DESCRIPTION - DESCRIPTORS: DESCRIPTORS: SPASM

## 2019-01-31 ASSESSMENT — PAIN DESCRIPTION - LOCATION: LOCATION: LEG

## 2019-01-31 ASSESSMENT — PAIN DESCRIPTION - PAIN TYPE: TYPE: ACUTE PAIN

## 2019-02-01 ENCOUNTER — CARE COORDINATION (OUTPATIENT)
Dept: CARE COORDINATION | Age: 64
End: 2019-02-01

## 2019-02-01 ENCOUNTER — TELEPHONE (OUTPATIENT)
Dept: INTERNAL MEDICINE | Age: 64
End: 2019-02-01

## 2019-02-04 DIAGNOSIS — J33.9 NASAL POLYP: ICD-10-CM

## 2019-02-04 DIAGNOSIS — E11.9 TYPE 2 DIABETES MELLITUS WITHOUT COMPLICATION (HCC): ICD-10-CM

## 2019-02-04 DIAGNOSIS — E78.5 HYPERLIPIDEMIA WITH TARGET LDL LESS THAN 100: ICD-10-CM

## 2019-02-04 DIAGNOSIS — I10 ESSENTIAL HYPERTENSION: ICD-10-CM

## 2019-02-04 DIAGNOSIS — R05.3 CHRONIC COUGH: ICD-10-CM

## 2019-02-05 RX ORDER — ASPIRIN 81 MG
TABLET,CHEWABLE ORAL
Qty: 30 TABLET | Refills: 0 | Status: SHIPPED | OUTPATIENT
Start: 2019-02-05 | End: 2019-02-22 | Stop reason: SDUPTHER

## 2019-02-05 RX ORDER — GLIPIZIDE 5 MG/1
TABLET ORAL
Qty: 60 TABLET | Refills: 0 | Status: SHIPPED | OUTPATIENT
Start: 2019-02-05 | End: 2019-02-06 | Stop reason: SDUPTHER

## 2019-02-05 RX ORDER — HYDRALAZINE HYDROCHLORIDE 50 MG/1
TABLET, FILM COATED ORAL
Qty: 120 TABLET | Refills: 0 | Status: SHIPPED | OUTPATIENT
Start: 2019-02-05 | End: 2019-02-22 | Stop reason: SDUPTHER

## 2019-02-05 RX ORDER — LORATADINE 10 MG/1
TABLET ORAL
Qty: 30 TABLET | Refills: 0 | Status: SHIPPED | OUTPATIENT
Start: 2019-02-05 | End: 2019-02-22 | Stop reason: SDUPTHER

## 2019-02-05 RX ORDER — ATORVASTATIN CALCIUM 40 MG/1
TABLET, FILM COATED ORAL
Qty: 30 TABLET | Refills: 0 | Status: SHIPPED | OUTPATIENT
Start: 2019-02-05 | End: 2019-02-22 | Stop reason: SDUPTHER

## 2019-02-05 RX ORDER — LOSARTAN POTASSIUM 100 MG/1
TABLET ORAL
Qty: 28 TABLET | Refills: 1 | Status: SHIPPED | OUTPATIENT
Start: 2019-02-05 | End: 2019-03-27 | Stop reason: SDUPTHER

## 2019-02-06 ENCOUNTER — OFFICE VISIT (OUTPATIENT)
Dept: ENDOCRINOLOGY | Age: 64
End: 2019-02-06
Payer: MEDICARE

## 2019-02-06 VITALS
SYSTOLIC BLOOD PRESSURE: 132 MMHG | HEIGHT: 64 IN | WEIGHT: 265 LBS | OXYGEN SATURATION: 98 % | BODY MASS INDEX: 45.24 KG/M2 | HEART RATE: 82 BPM | DIASTOLIC BLOOD PRESSURE: 80 MMHG | TEMPERATURE: 98.2 F

## 2019-02-06 DIAGNOSIS — E66.01 MORBID OBESITY (HCC): ICD-10-CM

## 2019-02-06 DIAGNOSIS — E11.9 TYPE 2 DIABETES MELLITUS WITHOUT COMPLICATION, WITHOUT LONG-TERM CURRENT USE OF INSULIN (HCC): ICD-10-CM

## 2019-02-06 DIAGNOSIS — E04.2 MULTINODULAR GOITER: Primary | ICD-10-CM

## 2019-02-06 LAB — HBA1C MFR BLD: 6.1 %

## 2019-02-06 PROCEDURE — 3046F HEMOGLOBIN A1C LEVEL >9.0%: CPT | Performed by: INTERNAL MEDICINE

## 2019-02-06 PROCEDURE — 1036F TOBACCO NON-USER: CPT | Performed by: INTERNAL MEDICINE

## 2019-02-06 PROCEDURE — G8427 DOCREV CUR MEDS BY ELIG CLIN: HCPCS | Performed by: INTERNAL MEDICINE

## 2019-02-06 PROCEDURE — 3017F COLORECTAL CA SCREEN DOC REV: CPT | Performed by: INTERNAL MEDICINE

## 2019-02-06 PROCEDURE — G8482 FLU IMMUNIZE ORDER/ADMIN: HCPCS | Performed by: INTERNAL MEDICINE

## 2019-02-06 PROCEDURE — 83036 HEMOGLOBIN GLYCOSYLATED A1C: CPT | Performed by: INTERNAL MEDICINE

## 2019-02-06 PROCEDURE — 99214 OFFICE O/P EST MOD 30 MIN: CPT | Performed by: INTERNAL MEDICINE

## 2019-02-06 PROCEDURE — 2022F DILAT RTA XM EVC RTNOPTHY: CPT | Performed by: INTERNAL MEDICINE

## 2019-02-06 PROCEDURE — G8417 CALC BMI ABV UP PARAM F/U: HCPCS | Performed by: INTERNAL MEDICINE

## 2019-02-06 RX ORDER — GLIPIZIDE 5 MG/1
TABLET ORAL
Qty: 60 TABLET | Refills: 0
Start: 2019-02-06 | End: 2019-02-25 | Stop reason: SDUPTHER

## 2019-02-13 ENCOUNTER — HOSPITAL ENCOUNTER (OUTPATIENT)
Age: 64
Discharge: HOME | End: 2019-02-13
Payer: MEDICARE

## 2019-02-13 DIAGNOSIS — E11.9 TYPE 2 DIABETES MELLITUS WITHOUT COMPLICATION, WITH LONG-TERM CURRENT USE OF INSULIN (HCC): ICD-10-CM

## 2019-02-13 DIAGNOSIS — Z79.4 TYPE 2 DIABETES MELLITUS WITHOUT COMPLICATION, WITH LONG-TERM CURRENT USE OF INSULIN (HCC): ICD-10-CM

## 2019-02-13 DIAGNOSIS — Z13.220 SCREENING FOR HYPERLIPIDEMIA: ICD-10-CM

## 2019-02-13 LAB
CHOLESTEROL, FASTING: 138 MG/DL
CHOLESTEROL/HDL RATIO: 2.8
ESTIMATED AVERAGE GLUCOSE: 123 MG/DL
HBA1C MFR BLD: 5.9 % (ref 4–6)
HDLC SERPL-MCNC: 49 MG/DL
LDL CHOLESTEROL: 78 MG/DL (ref 0–130)
TRIGLYCERIDE, FASTING: 57 MG/DL
VLDLC SERPL CALC-MCNC: NORMAL MG/DL (ref 1–30)

## 2019-02-13 PROCEDURE — 83036 HEMOGLOBIN GLYCOSYLATED A1C: CPT

## 2019-02-13 PROCEDURE — 80061 LIPID PANEL: CPT

## 2019-02-13 PROCEDURE — 36415 COLL VENOUS BLD VENIPUNCTURE: CPT

## 2019-02-15 ENCOUNTER — OFFICE VISIT (OUTPATIENT)
Dept: INTERNAL MEDICINE | Age: 64
End: 2019-02-15
Payer: MEDICARE

## 2019-02-15 VITALS
HEIGHT: 64 IN | HEART RATE: 92 BPM | BODY MASS INDEX: 44.66 KG/M2 | WEIGHT: 261.6 LBS | DIASTOLIC BLOOD PRESSURE: 71 MMHG | SYSTOLIC BLOOD PRESSURE: 125 MMHG

## 2019-02-15 DIAGNOSIS — K52.9 GASTROENTERITIS, ACUTE: Primary | ICD-10-CM

## 2019-02-15 DIAGNOSIS — I10 ESSENTIAL HYPERTENSION: ICD-10-CM

## 2019-02-15 DIAGNOSIS — M19.90 CHRONIC ARTHRITIS: ICD-10-CM

## 2019-02-15 DIAGNOSIS — H91.91 HEARING LOSS OF RIGHT EAR, UNSPECIFIED HEARING LOSS TYPE: ICD-10-CM

## 2019-02-15 DIAGNOSIS — Z12.11 COLON CANCER SCREENING: ICD-10-CM

## 2019-02-15 DIAGNOSIS — E11.00 TYPE 2 DIABETES MELLITUS WITH HYPEROSMOLARITY WITHOUT COMA, WITHOUT LONG-TERM CURRENT USE OF INSULIN (HCC): ICD-10-CM

## 2019-02-15 DIAGNOSIS — J45.20 INTERMITTENT ASTHMA WITHOUT COMPLICATION, UNSPECIFIED ASTHMA SEVERITY: ICD-10-CM

## 2019-02-15 DIAGNOSIS — Z00.00 PERIODIC HEALTH ASSESSMENT, GENERAL SCREENING, ADULT: ICD-10-CM

## 2019-02-15 PROCEDURE — 99214 OFFICE O/P EST MOD 30 MIN: CPT | Performed by: INTERNAL MEDICINE

## 2019-02-15 PROCEDURE — G8427 DOCREV CUR MEDS BY ELIG CLIN: HCPCS | Performed by: INTERNAL MEDICINE

## 2019-02-15 PROCEDURE — G8482 FLU IMMUNIZE ORDER/ADMIN: HCPCS | Performed by: INTERNAL MEDICINE

## 2019-02-15 PROCEDURE — 3017F COLORECTAL CA SCREEN DOC REV: CPT | Performed by: INTERNAL MEDICINE

## 2019-02-15 PROCEDURE — G8417 CALC BMI ABV UP PARAM F/U: HCPCS | Performed by: INTERNAL MEDICINE

## 2019-02-15 PROCEDURE — 1036F TOBACCO NON-USER: CPT | Performed by: INTERNAL MEDICINE

## 2019-02-15 PROCEDURE — 82274 ASSAY TEST FOR BLOOD FECAL: CPT | Performed by: INTERNAL MEDICINE

## 2019-02-15 PROCEDURE — 2022F DILAT RTA XM EVC RTNOPTHY: CPT | Performed by: INTERNAL MEDICINE

## 2019-02-15 PROCEDURE — 99211 OFF/OP EST MAY X REQ PHY/QHP: CPT | Performed by: INTERNAL MEDICINE

## 2019-02-15 PROCEDURE — 3044F HG A1C LEVEL LT 7.0%: CPT | Performed by: INTERNAL MEDICINE

## 2019-02-15 ASSESSMENT — ENCOUNTER SYMPTOMS
VOMITING: 0
SHORTNESS OF BREATH: 0
NAUSEA: 1
ABDOMINAL PAIN: 1
CHEST TIGHTNESS: 0

## 2019-02-25 RX ORDER — GLIPIZIDE 5 MG/1
TABLET ORAL
Qty: 30 TABLET | Refills: 3 | Status: SHIPPED | OUTPATIENT
Start: 2019-02-25 | End: 2019-06-21 | Stop reason: SDUPTHER

## 2019-03-01 ENCOUNTER — TELEPHONE (OUTPATIENT)
Dept: INTERNAL MEDICINE | Age: 64
End: 2019-03-01

## 2019-03-21 ENCOUNTER — HOSPITAL ENCOUNTER (OUTPATIENT)
Age: 64
Setting detail: SPECIMEN
Discharge: HOME | End: 2019-03-21
Payer: MEDICARE

## 2019-03-21 DIAGNOSIS — Z12.11 SCREENING FOR COLON CANCER: ICD-10-CM

## 2019-03-21 LAB
CONTROL: NORMAL
HEMOCCULT STL QL: NEGATIVE

## 2019-03-27 DIAGNOSIS — I10 ESSENTIAL HYPERTENSION: ICD-10-CM

## 2019-03-27 DIAGNOSIS — Z13.31 POSITIVE DEPRESSION SCREENING: ICD-10-CM

## 2019-03-29 RX ORDER — LOSARTAN POTASSIUM 100 MG/1
TABLET ORAL
Qty: 28 TABLET | Refills: 1 | Status: SHIPPED | OUTPATIENT
Start: 2019-03-29 | End: 2019-05-24 | Stop reason: SDUPTHER

## 2019-03-29 RX ORDER — CITALOPRAM 20 MG/1
TABLET ORAL
Qty: 28 TABLET | Refills: 2 | Status: SHIPPED | OUTPATIENT
Start: 2019-03-29 | End: 2019-05-24 | Stop reason: SDUPTHER

## 2019-03-29 RX ORDER — HYDROCHLOROTHIAZIDE 50 MG/1
TABLET ORAL
Qty: 30 TABLET | Refills: 2 | Status: SHIPPED | OUTPATIENT
Start: 2019-03-29 | End: 2019-05-24 | Stop reason: SDUPTHER

## 2019-04-10 ENCOUNTER — TELEPHONE (OUTPATIENT)
Dept: INTERNAL MEDICINE | Age: 64
End: 2019-04-10

## 2019-04-10 DIAGNOSIS — N31.9 NEUROGENIC BLADDER: Primary | ICD-10-CM

## 2019-04-10 NOTE — TELEPHONE ENCOUNTER
Patient is calling stating that her incontinence supplies were discontinued and stopped being mailed to her -- when she called the pharmacy they told her the doctor needed to send orders to start it back up. Patient would like the order to go to home care delivery so they will deliver them to her. Please advise        Health Maintenance   Topic Date Due    Hepatitis C screen  1955    HIV screen  10/26/1972    DTaP/Tdap/Td vaccine (1 - Tdap) 10/26/1976    Shingles Vaccine (1 of 2) 10/26/2007    Diabetic foot exam  08/31/2016    Diabetic retinal exam  01/01/2017    Cervical cancer screen  10/24/2017    Breast cancer screen  03/28/2019    Diabetic microalbuminuria test  08/20/2019    Potassium monitoring  12/14/2019    Creatinine monitoring  12/14/2019    A1C test (Diabetic or Prediabetic)  02/13/2020    Lipid screen  02/13/2020    Colon Cancer Screen FIT/FOBT  03/21/2020    Flu vaccine  Completed    Pneumococcal 0-64 years Vaccine  Completed             (applicable per patient's age: Cancer Screenings, Depression Screening, Fall Risk Screening, Immunizations)    Hemoglobin A1C (%)   Date Value   02/13/2019 5.9   02/06/2019 6.1   08/20/2018 7.1     Microalb/Crt.  Ratio (mcg/mg creat)   Date Value   08/20/2018 CANNOT BE CALCULATED     LDL Cholesterol (mg/dL)   Date Value   02/13/2019 78     AST (U/L)   Date Value   11/27/2018 31     ALT (U/L)   Date Value   11/27/2018 24     BUN (mg/dL)   Date Value   12/14/2018 13      (goal A1C is < 7)   (goal LDL is <100) need 30-50% reduction from baseline     BP Readings from Last 3 Encounters:   02/15/19 125/71   02/06/19 132/80   01/31/19 (!) 145/82    (goal /80)      All Future Testing planned in [facility]:  Lab Frequency Next Occurrence   Lipid, Fasting Once 09/19/2018   EMG Once 09/30/2018   HIV-1 And HIV-2 Antibodies Once 05/15/2019   Hepatitis C Antibody Once 05/15/2019       Next Visit Date:  Future Appointments   Date Time Provider Department Center   5/17/2019 10:30 AM Jayant Ramirez MD [address] IM [id number]   8/7/2019  1:00 PM Hue Rahman MD [facility] 49            Patient Active Problem List:     Type 2 diabetes mellitus with hyperosmolarity without coma, without long-term current use of insulin (HCC)     HTN (hypertension)     Depression     Uncomplicated asthma     Onychomycosis     Sinus tarsi syndrome     Multinodular goiter (nontoxic)     Obesity (BMI 30-39. 9)     Vitamin D deficiency disease     Hyperlipidemia with target LDL less than 100     Hallux limitus, bilateral      Depressive disorder, not elsewhere classified     Edema of right lower extremity     Benign positional vertigo     Dizzy

## 2019-04-15 NOTE — TELEPHONE ENCOUNTER
PC to patient in regards to what incontinence supplies were needed -- unable to get ahold of patient

## 2019-04-18 NOTE — TELEPHONE ENCOUNTER
PC to [facility] and they will fax a new order for briefs. Patient is out of briefs so will ask Dr. Tea Beebe to sign forms as he precepted at last visit.

## 2019-04-22 RX ORDER — DIAPER,BRIEF,ADULT, DISPOSABLE
EACH MISCELLANEOUS
Qty: 120 BOTTLE | Refills: 11 | Status: SHIPPED | OUTPATIENT
Start: 2019-04-22 | End: 2019-09-26

## 2019-05-24 ENCOUNTER — OFFICE VISIT (OUTPATIENT)
Dept: INTERNAL MEDICINE | Age: 64
End: 2019-05-24
Payer: MEDICARE

## 2019-05-24 ENCOUNTER — TELEPHONE (OUTPATIENT)
Dept: INTERNAL MEDICINE | Age: 64
End: 2019-05-24

## 2019-05-24 VITALS
HEART RATE: 90 BPM | HEIGHT: 64 IN | SYSTOLIC BLOOD PRESSURE: 131 MMHG | WEIGHT: 266 LBS | DIASTOLIC BLOOD PRESSURE: 84 MMHG | BODY MASS INDEX: 45.41 KG/M2

## 2019-05-24 DIAGNOSIS — R05.3 CHRONIC COUGH: ICD-10-CM

## 2019-05-24 DIAGNOSIS — J45.30 MILD PERSISTENT ASTHMA WITHOUT COMPLICATION: ICD-10-CM

## 2019-05-24 DIAGNOSIS — Z12.39 BREAST CANCER SCREENING: ICD-10-CM

## 2019-05-24 DIAGNOSIS — F32.0 CURRENT MILD EPISODE OF MAJOR DEPRESSIVE DISORDER, UNSPECIFIED WHETHER RECURRENT (HCC): ICD-10-CM

## 2019-05-24 DIAGNOSIS — E11.9 TYPE 2 DIABETES MELLITUS WITHOUT COMPLICATION, WITHOUT LONG-TERM CURRENT USE OF INSULIN (HCC): ICD-10-CM

## 2019-05-24 DIAGNOSIS — J02.9 SORE THROAT: ICD-10-CM

## 2019-05-24 DIAGNOSIS — J33.9 NASAL POLYP: ICD-10-CM

## 2019-05-24 DIAGNOSIS — I10 ESSENTIAL HYPERTENSION: ICD-10-CM

## 2019-05-24 DIAGNOSIS — H91.8X1 OTHER SPECIFIED HEARING LOSS OF RIGHT EAR, UNSPECIFIED HEARING STATUS ON CONTRALATERAL SIDE: Primary | ICD-10-CM

## 2019-05-24 DIAGNOSIS — E78.5 HYPERLIPIDEMIA WITH TARGET LDL LESS THAN 100: ICD-10-CM

## 2019-05-24 DIAGNOSIS — K21.9 GASTROESOPHAGEAL REFLUX DISEASE WITHOUT ESOPHAGITIS: ICD-10-CM

## 2019-05-24 PROCEDURE — G8417 CALC BMI ABV UP PARAM F/U: HCPCS | Performed by: INTERNAL MEDICINE

## 2019-05-24 PROCEDURE — 99213 OFFICE O/P EST LOW 20 MIN: CPT | Performed by: INTERNAL MEDICINE

## 2019-05-24 PROCEDURE — G8428 CUR MEDS NOT DOCUMENT: HCPCS | Performed by: INTERNAL MEDICINE

## 2019-05-24 PROCEDURE — 3044F HG A1C LEVEL LT 7.0%: CPT | Performed by: INTERNAL MEDICINE

## 2019-05-24 PROCEDURE — 99211 OFF/OP EST MAY X REQ PHY/QHP: CPT | Performed by: INTERNAL MEDICINE

## 2019-05-24 PROCEDURE — 2022F DILAT RTA XM EVC RTNOPTHY: CPT | Performed by: INTERNAL MEDICINE

## 2019-05-24 PROCEDURE — 3017F COLORECTAL CA SCREEN DOC REV: CPT | Performed by: INTERNAL MEDICINE

## 2019-05-24 PROCEDURE — 1036F TOBACCO NON-USER: CPT | Performed by: INTERNAL MEDICINE

## 2019-05-24 RX ORDER — AMLODIPINE BESYLATE 5 MG/1
5 TABLET ORAL DAILY
Qty: 30 TABLET | Refills: 3 | Status: SHIPPED | OUTPATIENT
Start: 2019-05-24 | End: 2019-09-18 | Stop reason: SDUPTHER

## 2019-05-24 RX ORDER — OMEPRAZOLE 20 MG/1
CAPSULE, DELAYED RELEASE ORAL
Qty: 60 CAPSULE | Refills: 3 | Status: SHIPPED | OUTPATIENT
Start: 2019-05-24 | End: 2019-09-18 | Stop reason: SDUPTHER

## 2019-05-24 RX ORDER — FLUTICASONE PROPIONATE 50 MCG
1 SPRAY, SUSPENSION (ML) NASAL DAILY
Qty: 1 BOTTLE | Refills: 0 | Status: SHIPPED | OUTPATIENT
Start: 2019-05-24 | End: 2019-09-26

## 2019-05-24 RX ORDER — HYDROCHLOROTHIAZIDE 50 MG/1
50 TABLET ORAL DAILY
Qty: 30 TABLET | Refills: 2 | Status: SHIPPED | OUTPATIENT
Start: 2019-05-24 | End: 2019-08-21 | Stop reason: SDUPTHER

## 2019-05-24 RX ORDER — LOSARTAN POTASSIUM 100 MG/1
100 TABLET ORAL DAILY
Qty: 30 TABLET | Refills: 3 | Status: CANCELLED | OUTPATIENT
Start: 2019-05-24

## 2019-05-24 RX ORDER — LOSARTAN POTASSIUM 100 MG/1
TABLET ORAL
Qty: 28 TABLET | Refills: 3 | Status: SHIPPED | OUTPATIENT
Start: 2019-05-24 | End: 2019-09-18 | Stop reason: SDUPTHER

## 2019-05-24 RX ORDER — METOPROLOL TARTRATE 50 MG/1
TABLET, FILM COATED ORAL
Qty: 90 TABLET | Refills: 4 | Status: SHIPPED | OUTPATIENT
Start: 2019-05-24 | End: 2019-10-15 | Stop reason: SDUPTHER

## 2019-05-24 RX ORDER — CITALOPRAM 10 MG/1
10 TABLET ORAL DAILY
Qty: 30 TABLET | Refills: 3 | Status: SHIPPED | OUTPATIENT
Start: 2019-05-24 | End: 2019-09-18 | Stop reason: SDUPTHER

## 2019-05-24 RX ORDER — HYDRALAZINE HYDROCHLORIDE 50 MG/1
TABLET, FILM COATED ORAL
Qty: 120 TABLET | Refills: 2 | Status: CANCELLED | OUTPATIENT
Start: 2019-05-24

## 2019-05-24 RX ORDER — LORATADINE 10 MG/1
10 TABLET ORAL DAILY
Qty: 30 TABLET | Refills: 3 | Status: SHIPPED | OUTPATIENT
Start: 2019-05-24 | End: 2019-09-26

## 2019-05-24 RX ORDER — ASPIRIN 81 MG/1
81 TABLET, CHEWABLE ORAL DAILY
Qty: 30 TABLET | Refills: 3 | Status: SHIPPED | OUTPATIENT
Start: 2019-05-24 | End: 2019-09-18 | Stop reason: SDUPTHER

## 2019-05-24 RX ORDER — HYDRALAZINE HYDROCHLORIDE 50 MG/1
TABLET, FILM COATED ORAL
Qty: 120 TABLET | Refills: 2 | Status: SHIPPED | OUTPATIENT
Start: 2019-05-24 | End: 2019-08-21 | Stop reason: SDUPTHER

## 2019-05-24 RX ORDER — ALBUTEROL SULFATE 90 UG/1
2 AEROSOL, METERED RESPIRATORY (INHALATION) EVERY 6 HOURS PRN
Qty: 1 INHALER | Refills: 3 | Status: SHIPPED | OUTPATIENT
Start: 2019-05-24

## 2019-05-24 RX ORDER — ATORVASTATIN CALCIUM 40 MG/1
40 TABLET, FILM COATED ORAL NIGHTLY
Qty: 30 TABLET | Refills: 3 | Status: SHIPPED | OUTPATIENT
Start: 2019-05-24 | End: 2019-09-18 | Stop reason: SDUPTHER

## 2019-05-24 ASSESSMENT — ENCOUNTER SYMPTOMS
SORE THROAT: 1
CHEST TIGHTNESS: 0
WHEEZING: 0
SHORTNESS OF BREATH: 0
ABDOMINAL PAIN: 0
VOICE CHANGE: 0
TROUBLE SWALLOWING: 0
NAUSEA: 0
VOMITING: 0

## 2019-05-24 NOTE — PATIENT INSTRUCTIONS
Follow-up appointment scheduled for 08/28/19    , AVS given to patient. Medications e-scribe to pharmacy of pt's choice.        Order for mammogram faxed to scheduling, they will contact patient with an appt original order given to patient along with scheduling phone number       tv

## 2019-05-24 NOTE — LETTER
[address]  [address] [facility] [address]  Phone: [phone]  Fax: [phone]    Chanel Joshi MD        May 24, 2019    Zaid Almodovar  [address] [facility] [address]      Dear Vineet Messer: We are sending this letter because your PCP ordered [facility] for you to have done at your last visit here and they have not yet been completed. If you can please come to our office on the 2nd floor to  your orders to have them compelted. If you do not have a follow-up appointment scheduled you can either contact the office to make an appointment with us or you can make one when you come in to pick-up your orders. If you have any questions or concerns, please don't hesitate to call.     Sincerely,        Chanel Joshi MD

## 2019-05-24 NOTE — PROGRESS NOTES
DIABETES and HYPERTENSION visit    BP Readings from Last 3 Encounters:   02/15/19 125/71   02/06/19 132/80   01/31/19 (!) 145/82        Hemoglobin A1C (%)   Date Value   02/13/2019 5.9   02/06/2019 6.1   08/20/2018 7.1     Microalb/Crt. Ratio (mcg/mg creat)   Date Value   08/20/2018 CANNOT BE CALCULATED     LDL Cholesterol (mg/dL)   Date Value   02/13/2019 78     HDL (mg/dL)   Date Value   02/13/2019 49     BUN (mg/dL)   Date Value   12/14/2018 13     CREATININE (mg/dL)   Date Value   12/14/2018 0.56     Glucose (mg/dL)   Date Value   12/14/2018 109 (H)   05/14/2012 176 (H)            Have you changed or started any medications since your last visit including any over-the-counter medicines, vitamins, or herbal medicines? no   Have you stopped taking any of your medications? Is so, why? -  no  Are you having any side effects from any of your medications? - no    Have you seen any other physician or provider since your last visit? yes - ent   Have you had any other diagnostic tests since your last visit?  no   Have you been seen in the emergency room and/or had an admission in a hospital since we last saw you?  no   Have you had your routine dental cleaning in the past 6 months?  no     Have you had your annual diabetic retinal (eye) exam? No   (ensure copy of exam is in the chart)    Do you have an active MyChart account? If no, what is the barrier?   No:     Patient Care Team:  Jared Garcia MD as PCP - General (Internal Medicine)  Micheal Herbert MD as PCP - S Attributed Provider    Medical History Review  Past Medical, Family, and Social History reviewed and does not contribute to the patient presenting condition    Health Maintenance   Topic Date Due    Hepatitis C screen  1955    HIV screen  10/26/1972    DTaP/Tdap/Td vaccine (1 - Tdap) 10/26/1976    Shingles Vaccine (1 of 2) 10/26/2007    Diabetic foot exam  08/31/2016    Diabetic retinal exam  01/01/2017    Cervical cancer screen 10/24/2017    Breast cancer screen  03/28/2019    Diabetic microalbuminuria test  08/20/2019    Potassium monitoring  12/14/2019    Creatinine monitoring  12/14/2019    A1C test (Diabetic or Prediabetic)  02/13/2020    Lipid screen  02/13/2020    Colon Cancer Screen FIT/FOBT  03/21/2020    Flu vaccine  Completed    Pneumococcal 0-64 years Vaccine  Completed

## 2019-05-24 NOTE — PROGRESS NOTES
[facility]    Progress Note    Date of patient's visit: 5/24/2019  Date ofBirth: 1955  Patient's Name:  Jeffery Adames    Patient Care Team:  Anderson Garcia MD as PCP - General (Internal Medicine)  Indu Cummings MD as PCP - [facility] Attributed Provider    REASON FOR VISIT: Routine outpatient follow for hypertension, Dm and hearing loss    HISTORY OFPRESENT ILLNESS:    History was obtained from the patient. Jeffery Adames is a 66 y.o. is here for a hypertension, diabetes, hearing loss    BP is controlled- Losartan 100 mg daily, HCTZ 50 mg daily, Norvasc 5 mg, Hydralazine 50 mg 4 times. Was to Dr. Brittany Lincoln for diabetes and.  Her last A1c was 5. 9. She missed her last appointment with endocrinologist told will make another one. On last since she complained of loss of hearing on the right ear. Advised to go to ENT but her insurance did not cover Dr Jena Vega and hence need another external referral.    Last mammogram in 2017    REVIEW OF SYSTEMS:    Review of Systems   Constitutional: Negative for chills and fever. HENT: Positive for hearing loss and sore throat. Negative for ear discharge, ear pain, trouble swallowing and voice change. Eyes: Negative for visual disturbance. Respiratory: Negative for chest tightness, shortness of breath and wheezing. Cardiovascular: Negative for chest pain, palpitations and leg swelling. Gastrointestinal: Negative for abdominal pain, nausea and vomiting. Genitourinary: Negative for dysuria. Musculoskeletal: Negative for joint swelling. Skin: Negative for rash. Neurological: Negative for speech difficulty and weakness. Psychiatric/Behavioral: Negative for agitation and confusion.          Patient Active Problem List   Diagnosis    Type 2 diabetes mellitus with hyperosmolarity without coma, without long-term current use of insulin ([facility] [address]    HTN (hypertension)    Depression    Uncomplicated asthma    Onychomycosis    Sinus tarsi syndrome    Multinodular goiter (nontoxic)    Obesity (BMI 30-39. 9)    Vitamin D deficiency disease    Hyperlipidemia with target LDL less than 100    Hallux limitus, bilateral     Depressive disorder, not elsewhere classified    Edema of right lower extremity    Benign positional vertigo    Dizzy       ALLERGIES      Allergies   Allergen Reactions    Amoxicillin     Pcn [Penicillins] Hives and Itching         MEDICATIONS:      Current Outpatient Medications on File Prior to Visit   Medication Sig Dispense Refill    Incontinence Supply Disposable (DISPOSABLE BRIEF LARGE) MISC Use as directed 3-4 times /day 120 Bottle 11    citalopram (CELEXA) 20 MG tablet TAKE 1/2 TO 1 TABLET BY MOUTH ONCE DAILY 28 tablet 2    hydrochlorothiazide (HYDRODIURIL) 50 MG tablet TAKE 1 TABLET BY MOUTH ONE TIME A DAY 30 tablet 2    losartan (COZAAR) 100 MG tablet TAKE 1 TABLET BY MOUTH ONE TIME A DAY 28 tablet 1    atorvastatin (LIPITOR) 40 MG tablet TAKE 1 TABLET BY MOUTH ONE TIME A DAY 30 tablet 3    ASPIRIN LOW DOSE 81 MG chewable tablet CHEW AND SWALLOW 1 TABLET BY MOUTH ONE TIME A DAY 30 tablet 3    loratadine (CLARITIN) 10 MG tablet TAKE 1 TABLET BY MOUTH ONE TIME A DAY 30 tablet 3    hydrALAZINE (APRESOLINE) 50 MG tablet TAKE 1 TABLET BY MOUTH 4 TIMES A  tablet 2    glipiZIDE (GLUCOTROL) 5 MG tablet ONE HALF TAB BID  BREAKFAST AND SUPPER 30 tablet 3    acetaminophen (TYLENOL) 325 MG tablet Take 2 tablets by mouth every 6 hours as needed for Pain 40 tablet 3    albuterol sulfate  (90 Base) MCG/ACT inhaler Inhale 2 puffs into the lungs every 6 hours as needed for Wheezing 1 Inhaler 3    beclomethasone (QVAR) 80 MCG/ACT inhaler Inhale 1 puff into the lungs 2 times daily 1 Inhaler 3    blood glucose test strips (TRUE METRIX BLOOD GLUCOSE TEST) strip 1 each by In Vitro route 2 times daily As needed.  100 each 5    vitamin D (ERGOCALCIFEROL) 70378 units CAPS capsule Take 1 capsule by mouth once a week 4 capsule 5    metoprolol tartrate (LOPRESSOR) 50 MG tablet TAKE 1 & 1/2 TABLETS BY MOUTH TWICE DAILY 90 tablet 4    Alcohol Swabs (ALCOHOL WIPES) 70 % PADS Use up to 4 times a day when testing blood sugars 100 each 5    Lancets MISC 1 each by Does not apply route daily Use 4 times daily Diagnisis:250.0  Diabetes Mellitus 100 each 11    omeprazole (PRILOSEC) 20 MG delayed release capsule TAKE ONE CAPSULE BY MOUTH TWICE A DAY 60 capsule 3    amLODIPine (NORVASC) 5 MG tablet Take 1 tablet by mouth daily 30 tablet 3    Multiple Vitamins-Minerals (THERAPEUTIC MULTIVITAMIN-MINERALS) tablet Take 1 tablet by mouth daily 30 tablet 11    nystatin (MYCOSTATIN) 179865 UNIT/GM cream Apply topically 2 times daily. 1 Tube 0    fluticasone (FLONASE) 50 MCG/ACT nasal spray 1 spray by Each Nare route daily 1 Bottle 0     No current facility-administered medications on file prior to visit. SOCIAL HISTORY    Reviewed and no change from previous record. Chemo Vanegas  reports that she has never smoked. She has never used smokeless tobacco.    FAMILY HISTORY:    Reviewed and No change from previous visit      PHYSICAL EXAM:      Vitals:    05/24/19 1059   BP: 131/84   Pulse: 90     Physical Exam   Constitutional: She is oriented to person, place, and time. No distress. HENT:   Mouth/Throat: Oropharynx is clear and moist.   Eyes: Conjunctivae are normal.   Cardiovascular: Normal rate, regular rhythm and normal heart sounds. Exam reveals no friction rub. No murmur heard. Pulmonary/Chest: Effort normal and breath sounds normal. No respiratory distress. She has no wheezes. She exhibits no tenderness. Abdominal: Soft. She exhibits no distension. There is no tenderness. Musculoskeletal: She exhibits no edema. Neurological: She is alert and oriented to person, place, and time. She exhibits normal muscle tone.          LABORATORY FINDINGS:    CBC:  Lab Results   Component Value Date    WBC 6.4 12/14/2018    HGB 11.7 12/14/2018     12/14/2018     02/21/2012     BMP:    Lab Results   Component Value Date     12/14/2018    K 3.6 12/14/2018     12/14/2018    CO2 22 12/14/2018    BUN 13 12/14/2018    CREATININE 0.56 12/14/2018    GLUCOSE 109 12/14/2018    GLUCOSE 176 05/14/2012     HEMOGLOBIN A1C:   Lab Results   Component Value Date    LABA1C 5.9 02/13/2019     MICROALBUMIN URINE:   Lab Results   Component Value Date    MICROALBUR <12 08/20/2018     FASTINGLIPID PANEL:  Lab Results   Component Value Date    CHOL 116 05/01/2017    HDL 49 02/13/2019    TRIG 54 05/01/2017     LIVER [email or web address]   THYROID FUNCTION:   Lab Results   Component Value Date    TSH 1.51 06/07/2018      URINE ANALYSIS: No results found for: 138[address] James Cohn:      Health Maintenance Due   Topic Date Due    Hepatitis C screen  1955    HIV screen  10/26/1972    DTaP/Tdap/Td vaccine (1 - Tdap) 10/26/1976    Shingles Vaccine (1 of 2) 10/26/2007    Diabetic foot exam  08/31/2016    Diabetic retinal exam  01/01/2017    Cervical cancer screen  10/24/2017    Breast cancer screen  03/28/2019       ASSESSMENT AND PLAN:    1. Current mild episode of major depressive disorder, unspecified whether recurrent (HCC)    - citalopram (CELEXA) 10 MG tablet; Take 1 tablet by mouth daily  Dispense: 30 tablet; Refill: 3    2. Essential hypertension    - hydrALAZINE (APRESOLINE) 50 MG tablet; TAKE 1 TABLET BY MOUTH 4 TIMES A DAY  Dispense: 120 tablet; Refill: 2  - hydrochlorothiazide (HYDRODIURIL) 50 MG tablet; Take 1 tablet by mouth daily  Dispense: 30 tablet; Refill: 2  - losartan (COZAAR) 100 MG tablet; Take 1 tablet by mouth daily  Dispense: 30 tablet; Refill: 3  - metoprolol tartrate (LOPRESSOR) 50 MG tablet; TAKE 1 & 1/2 TABLETS BY MOUTH TWICE DAILY  Dispense: 90 tablet; Refill: 4  - amLODIPine (NORVASC) 5 MG tablet; Take 1 tablet by mouth daily  Dispense: 30 tablet; Refill: 3    3. Hyperlipidemia with target LDL less than 100    - atorvastatin (LIPITOR) 40 MG tablet; Take 1 tablet by mouth nightly  Dispense: 30 tablet; Refill: 3    4. Type [facility] diabetes mellitus without complication ([facility]    Follow with Dr. Kyle Vides and continue Glipizide as prescribed by him  -   5. Sore throat      - fluticasone (FLONASE) 50 MCG/ACT nasal spray; 1 spray by Each Nostril route daily  Dispense: 1 Bottle; Refill: 0    6. Nasal polyp    - loratadine (CLARITIN) 10 MG tablet; Take 1 tablet by mouth daily  Dispense: 30 tablet; Refill: 3    7. Mild persistent asthma without complication    - beclomethasone (QVAR) 80 MCG/ACT inhaler; Inhale 1 puff into the lungs 2 times daily  Dispense: 1 Inhaler; Refill: 3  - albuterol sulfate  (90 Base) MCG/ACT inhaler; Inhale 2 puffs into the lungs every 6 hours as needed for Wheezing  Dispense: 1 Inhaler; Refill: 3    8. Gastroesophageal reflux disease without esophagitis    - omeprazole (PRILOSEC) 20 MG delayed release capsule; TAKE ONE CAPSULE BY MOUTH TWICE A DAY  Dispense: 60 capsule; Refill: 3    9. Other specified hearing loss of right ear, unspecified hearing status on contralateral side    - External Referral To ENT    10. Breast cancer screening    - KECIA DIGITAL SCREEN W OR WO CAD BILATERAL; Future      FOLLOW UP:       1. Kaye receivedcounseling on the following healthy behaviors: nutrition, exercise and medication adherence  2. Patient given educational materials - see patient instructions  3. Discussed use, benefit, and side effects of prescribed medications. Barriers to medication compliance addressed. All patient questions answered. Pt voiced understanding. 4.  Reviewed prior labs and healthmaintenance  5. Continue current medications, diet and exercise.          Flor Piper MD  PGY-3 Internal Medicine Resident  [facility]  5/24/2019  11:29 AM

## 2019-05-25 ENCOUNTER — TELEPHONE (OUTPATIENT)
Dept: INTERNAL MEDICINE | Age: 64
End: 2019-05-25

## 2019-05-25 NOTE — TELEPHONE ENCOUNTER
PA request for Albuterol Sulfate     PA processed and submitted to pt insurance, waiting for response in regards to coverage

## 2019-05-25 NOTE — TELEPHONE ENCOUNTER
PA request for Qvar Redihaler     PA processed and submitted to pt insurance, waiting for response in regards to coverage

## 2019-06-06 ENCOUNTER — HOSPITAL ENCOUNTER (OUTPATIENT)
Dept: MAMMOGRAPHY | Age: 64
Discharge: HOME | End: 2019-06-08
Payer: MEDICARE

## 2019-06-06 DIAGNOSIS — Z12.39 BREAST CANCER SCREENING: ICD-10-CM

## 2019-06-06 PROCEDURE — 77063 BREAST TOMOSYNTHESIS BI: CPT

## 2019-06-21 RX ORDER — GLIPIZIDE 5 MG/1
TABLET ORAL
Qty: 30 TABLET | Refills: 5 | Status: SHIPPED | OUTPATIENT
Start: 2019-06-21 | End: 2019-12-04 | Stop reason: SDUPTHER

## 2019-08-06 NOTE — PROGRESS NOTES
CAPSULE BY MOUTH TWICE A DAY 60 capsule 3    amLODIPine (NORVASC) 5 MG tablet Take 1 tablet by mouth daily 30 tablet 3    fluticasone (FLONASE) 50 MCG/ACT nasal spray 1 spray by Each Nostril route daily 1 Bottle 0    menthol-cetylpyridinium (CEPACOL REGULAR STRENGTH) 3 MG lozenge Take 1 lozenge by mouth as needed for Sore Throat 30 lozenge 2    losartan (COZAAR) 100 MG tablet TAKE 1 TABLET BY MOUTH ONE TIME A DAY 28 tablet 3    Incontinence Supply Disposable (DISPOSABLE BRIEF LARGE) MISC Use as directed 3-4 times /day 120 Bottle 11    acetaminophen (TYLENOL) 325 MG tablet Take 2 tablets by mouth every 6 hours as needed for Pain 40 tablet 3    blood glucose test strips (TRUE METRIX BLOOD GLUCOSE TEST) strip 1 each by In Vitro route 2 times daily As needed. 100 each 5    vitamin D (ERGOCALCIFEROL) 75076 units CAPS capsule Take 1 capsule by mouth once a week 4 capsule 5    Alcohol Swabs (ALCOHOL WIPES) 70 % PADS Use up to 4 times a day when testing blood sugars 100 each 5    Lancets MISC 1 each by Does not apply route daily Use 4 times daily Diagnisis:250.0  Diabetes Mellitus 100 each 11    Multiple Vitamins-Minerals (THERAPEUTIC MULTIVITAMIN-MINERALS) tablet Take 1 tablet by mouth daily 30 tablet 11    nystatin (MYCOSTATIN) 846215 UNIT/GM cream Apply topically 2 times daily. 1 Tube 0     No current facility-administered medications for this visit. Allergies   Allergen Reactions    Amoxicillin     Pcn [Penicillins] Hives and Itching       Subjective:      Review of Systems      AS NOTED IN HPI       Objective:      Physical Exam     66YEAR OLD  FEMALE MORBIDLY OBESE IN NO DISTRESS  VITALS REVIEWED. Eyes: CARLOS.   ENT: THROAT CLEAR. Annamary Ripa HEARING DECREASED. Mishel Ghazi. FEELS MULTINODULAR. 3 TIMES NORMAL SIZE. RT THYROID LOBE NODULES LARGER THAN LEFT THYROID NODULES. NO TENDERNESS. NO BRUIT OVER THYROID. NO SUBSTERNAL EXSTENSION NOTED. NO ADENOPATHY IN NECK.   NO CAROTID thyroid nodules are present including a dominant nodule on   the right side with increased vascularity of 6.52 x 6.03 x 4.31 cm.  A nodule   in the isthmus of 1.67 x 1.07 x 1.4 cm is noted.       In the left thyroid lobe up, nodule lateral upper pole 120 2 5 x 1.92 x 0.8   cm is noted with medial upper pole nodule of 0.8 x 0.9 x 0.7 cm with   calcification.  Inferior pole nodule left lobe of the thyroid of 2.79 x 3.28   x 3.0 cm is present.       Cervical lymphadenopathy: No abnormal lymph nodes in the imaged portions of   the neck.            Multiple thyroid nodules compatible with multi nodular goiter. Sidney Maker has   been interval enlargement of the dominant nodule in the inferior pole of the   right lobe of the thyroid, previously 5 cm maximum diameter.       RECOMMENDATIONS:   Tissue sampling of the dominant nodule in the right lobe of the thyroid is   advised.  Also suggest tissue sampling of the largest nodule in the inferior   pole of the left lobe of the thyroid.          EXAMINATION:   THYROID ULTRASOUND       11/2/2016       COMPARISON:   04/19/2013       HISTORY:   ORDERING SYSTEM PROVIDED HISTORY: THYROID CANCER       FINDINGS:   Right thyroid lobe:  6.9 x 4.2 x 4.7 cm       Left thyroid lobe:  5.2 x 2.7 x 2.7 cm       Isthmus:  1.3 cm       Thyroid Gland:  Thyroid gland demonstrates normal echotexture and vascularity.       Nodules:  There are multiple nodules throughout both thyroid lobes.  The   largest nodule is a dominant mass in the right thyroid lobe measuring up to 5   cm.  This is heterogeneous with minimal cystic change and a few punctate   calcifications.  The largest nodule on the left measures up to 1.3 cm.       Cervical lymphadenopathy: No abnormal lymph nodes in the imaged portions of   the neck.      PREVIOUS THYROID ULTRASOUND 11/2/16     EXAMINATION:   THYROID ULTRASOUND       11/2/2016       COMPARISON:   04/19/2013       HISTORY:   ORDERING SYSTEM PROVIDED HISTORY: THYROID CANCER     FINDINGS:   Right thyroid lobe:  6.9 x 4.2 x 4.7 cm       Left thyroid lobe:  5.2 x 2.7 x 2.7 cm       Isthmus:  1.3 cm       Thyroid Gland:  Thyroid gland demonstrates normal echotexture and vascularity.       Nodules: There are multiple nodules throughout both thyroid lobes.  The   largest nodule is a dominant mass in the right thyroid lobe measuring up to 5   cm.  This is heterogeneous with minimal cystic change and a few punctate   calcifications.  The largest nodule on the left measures up to 1.3 cm.       Cervical lymphadenopathy: No abnormal lymph nodes in the imaged portions of   the neck. IMPRESSION :      LARGE MULTINODULAR GOITER.  INCREASE IN RT THYROID NODULE  REPORTED IN RECENT THYROID ULTRASOUND ON  11/17/17. PATIENT HAS NO SIGNIFICANT PRESSURE SYMPTOMS IN NECK AND IS EUTHYROID  FINE NEEDLE ASPIRATION  7/6/18   CONSISTENT WITH A BENIGN FOLLICULAR NODULE (INCLUDES         ADENOMATOID NODULE, COLLOID NODULE, ETC.)      TYPE 2 DIABETES MELLITUS . HAS INTOLERANCE TO METFORMIN    OTHER PROBLEMS :      HYPERTENSION ON MULTIPLE B.P MEDS      HYPERCHOLESTEROLEMIA ON LIPITOR     MORBID OBESITY     HX OF ASTHMA    DEG, ARTHRITIS BOTH KNEES     POST MENOPAUSAL. S/P PARTIAL HYSTERECTOMY. HAS HOT FLASHES.     HX OF VIT D  DEFICIENCY     HX OF DEPRESSION STABLE ON CITALOPRAM       1. Multinodular goiter    2. Type 2 diabetes mellitus without complication, without long-term current use of insulin ([address]    3. Morbid obesity ([address]              Plan:      1. A1C RESULTS FROM TODAY  DISCUSSED  2. GET  THYROID ULTRASOUND AND TSH  3. CONTINUE GLUCATROL TO 2.5 MG ( ONE HALF OF 5 MG  BID)  4. ADD JANUVIA 50 MG DAILY  5. CONTINUE ALL MEDS AS PER OTHER M.D  DISCUSSED  6. DIET  AND EXERCISE AS TOLERATED  7. CHECK OTHER LAB AS ORDERED.   8. RETURN 5  MONTHS  9. TO FOLLOW WITH PCP     Orders Placed This Encounter   Procedures    POCT glycosylated hemoglobin (Hb A1C)     No orders of the defined types were placed in this

## 2019-08-07 ENCOUNTER — OFFICE VISIT (OUTPATIENT)
Dept: ENDOCRINOLOGY | Age: 64
End: 2019-08-07
Payer: MEDICARE

## 2019-08-07 VITALS
HEART RATE: 76 BPM | SYSTOLIC BLOOD PRESSURE: 150 MMHG | WEIGHT: 279 LBS | BODY MASS INDEX: 47.63 KG/M2 | DIASTOLIC BLOOD PRESSURE: 74 MMHG | OXYGEN SATURATION: 99 % | TEMPERATURE: 98.4 F | HEIGHT: 64 IN

## 2019-08-07 DIAGNOSIS — E66.01 MORBID OBESITY (HCC): ICD-10-CM

## 2019-08-07 DIAGNOSIS — E11.9 TYPE 2 DIABETES MELLITUS WITHOUT COMPLICATION, WITHOUT LONG-TERM CURRENT USE OF INSULIN (HCC): ICD-10-CM

## 2019-08-07 DIAGNOSIS — E04.2 MULTINODULAR GOITER: Primary | ICD-10-CM

## 2019-08-07 LAB — HBA1C MFR BLD: 6.9 %

## 2019-08-07 PROCEDURE — 83036 HEMOGLOBIN GLYCOSYLATED A1C: CPT | Performed by: INTERNAL MEDICINE

## 2019-08-07 PROCEDURE — 3044F HG A1C LEVEL LT 7.0%: CPT | Performed by: INTERNAL MEDICINE

## 2019-08-07 PROCEDURE — 3017F COLORECTAL CA SCREEN DOC REV: CPT | Performed by: INTERNAL MEDICINE

## 2019-08-07 PROCEDURE — 2022F DILAT RTA XM EVC RTNOPTHY: CPT | Performed by: INTERNAL MEDICINE

## 2019-08-07 PROCEDURE — 99214 OFFICE O/P EST MOD 30 MIN: CPT | Performed by: INTERNAL MEDICINE

## 2019-08-07 PROCEDURE — G8417 CALC BMI ABV UP PARAM F/U: HCPCS | Performed by: INTERNAL MEDICINE

## 2019-08-07 PROCEDURE — G8427 DOCREV CUR MEDS BY ELIG CLIN: HCPCS | Performed by: INTERNAL MEDICINE

## 2019-08-07 PROCEDURE — 1036F TOBACCO NON-USER: CPT | Performed by: INTERNAL MEDICINE

## 2019-08-07 RX ORDER — DIPHENHYDRAMINE HYDROCHLORIDE 25 MG/1
TABLET ORAL
COMMUNITY

## 2019-08-21 DIAGNOSIS — I10 ESSENTIAL HYPERTENSION: ICD-10-CM

## 2019-08-21 RX ORDER — HYDRALAZINE HYDROCHLORIDE 50 MG/1
TABLET, FILM COATED ORAL
Qty: 120 TABLET | Refills: 2 | Status: SHIPPED | OUTPATIENT
Start: 2019-08-21 | End: 2019-11-08 | Stop reason: SDUPTHER

## 2019-08-21 RX ORDER — HYDROCHLOROTHIAZIDE 50 MG/1
50 TABLET ORAL DAILY
Qty: 30 TABLET | Refills: 2 | Status: SHIPPED | OUTPATIENT
Start: 2019-08-21 | End: 2019-11-07 | Stop reason: SDUPTHER

## 2019-09-16 DIAGNOSIS — K21.9 GASTROESOPHAGEAL REFLUX DISEASE WITHOUT ESOPHAGITIS: ICD-10-CM

## 2019-09-16 DIAGNOSIS — I10 ESSENTIAL HYPERTENSION: ICD-10-CM

## 2019-09-16 DIAGNOSIS — E78.5 HYPERLIPIDEMIA WITH TARGET LDL LESS THAN 100: ICD-10-CM

## 2019-09-16 DIAGNOSIS — E11.9 TYPE 2 DIABETES MELLITUS WITHOUT COMPLICATION, WITHOUT LONG-TERM CURRENT USE OF INSULIN (HCC): ICD-10-CM

## 2019-09-16 DIAGNOSIS — F32.0 CURRENT MILD EPISODE OF MAJOR DEPRESSIVE DISORDER, UNSPECIFIED WHETHER RECURRENT (HCC): ICD-10-CM

## 2019-09-18 RX ORDER — OMEPRAZOLE 20 MG/1
CAPSULE, DELAYED RELEASE ORAL
Qty: 60 CAPSULE | Refills: 3 | Status: SHIPPED | OUTPATIENT
Start: 2019-09-18 | End: 2020-01-02 | Stop reason: SDUPTHER

## 2019-09-18 RX ORDER — ATORVASTATIN CALCIUM 40 MG/1
40 TABLET, FILM COATED ORAL NIGHTLY
Qty: 30 TABLET | Refills: 3 | Status: SHIPPED | OUTPATIENT
Start: 2019-09-18 | End: 2020-01-02 | Stop reason: SDUPTHER

## 2019-09-18 RX ORDER — LOSARTAN POTASSIUM 100 MG/1
TABLET ORAL
Qty: 28 TABLET | Refills: 3 | Status: SHIPPED | OUTPATIENT
Start: 2019-09-18 | End: 2020-01-02 | Stop reason: SDUPTHER

## 2019-09-18 RX ORDER — ASPIRIN 81 MG/1
81 TABLET, CHEWABLE ORAL DAILY
Qty: 30 TABLET | Refills: 3 | Status: SHIPPED | OUTPATIENT
Start: 2019-09-18 | End: 2020-01-02 | Stop reason: SDUPTHER

## 2019-09-18 RX ORDER — AMLODIPINE BESYLATE 5 MG/1
5 TABLET ORAL DAILY
Qty: 30 TABLET | Refills: 3 | Status: SHIPPED | OUTPATIENT
Start: 2019-09-18 | End: 2020-01-02 | Stop reason: SDUPTHER

## 2019-09-18 RX ORDER — CITALOPRAM 10 MG/1
10 TABLET ORAL DAILY
Qty: 30 TABLET | Refills: 3 | Status: SHIPPED | OUTPATIENT
Start: 2019-09-18 | End: 2019-09-26

## 2019-09-26 ENCOUNTER — OFFICE VISIT (OUTPATIENT)
Dept: INTERNAL MEDICINE | Age: 64
End: 2019-09-26
Payer: MEDICARE

## 2019-09-26 VITALS
WEIGHT: 269.2 LBS | DIASTOLIC BLOOD PRESSURE: 75 MMHG | SYSTOLIC BLOOD PRESSURE: 123 MMHG | BODY MASS INDEX: 45.96 KG/M2 | HEIGHT: 64 IN | HEART RATE: 84 BPM

## 2019-09-26 DIAGNOSIS — Z13.31 POSITIVE DEPRESSION SCREENING: ICD-10-CM

## 2019-09-26 DIAGNOSIS — J45.30 MILD PERSISTENT ASTHMA WITHOUT COMPLICATION: ICD-10-CM

## 2019-09-26 DIAGNOSIS — I10 ESSENTIAL HYPERTENSION: ICD-10-CM

## 2019-09-26 DIAGNOSIS — E55.9 VITAMIN D DEFICIENCY DISEASE: ICD-10-CM

## 2019-09-26 DIAGNOSIS — E11.9 TYPE 2 DIABETES MELLITUS WITHOUT COMPLICATION, WITHOUT LONG-TERM CURRENT USE OF INSULIN (HCC): Primary | ICD-10-CM

## 2019-09-26 PROCEDURE — 99211 OFF/OP EST MAY X REQ PHY/QHP: CPT | Performed by: INTERNAL MEDICINE

## 2019-09-26 PROCEDURE — 1036F TOBACCO NON-USER: CPT | Performed by: INTERNAL MEDICINE

## 2019-09-26 PROCEDURE — 3017F COLORECTAL CA SCREEN DOC REV: CPT | Performed by: INTERNAL MEDICINE

## 2019-09-26 PROCEDURE — 99214 OFFICE O/P EST MOD 30 MIN: CPT | Performed by: INTERNAL MEDICINE

## 2019-09-26 PROCEDURE — G8417 CALC BMI ABV UP PARAM F/U: HCPCS | Performed by: INTERNAL MEDICINE

## 2019-09-26 PROCEDURE — G8427 DOCREV CUR MEDS BY ELIG CLIN: HCPCS | Performed by: INTERNAL MEDICINE

## 2019-09-26 PROCEDURE — 3044F HG A1C LEVEL LT 7.0%: CPT | Performed by: INTERNAL MEDICINE

## 2019-09-26 PROCEDURE — 2022F DILAT RTA XM EVC RTNOPTHY: CPT | Performed by: INTERNAL MEDICINE

## 2019-09-26 RX ORDER — ERGOCALCIFEROL 1.25 MG/1
50000 CAPSULE ORAL WEEKLY
Qty: 4 CAPSULE | Refills: 5 | Status: SHIPPED | OUTPATIENT
Start: 2019-09-26

## 2019-09-26 ASSESSMENT — ENCOUNTER SYMPTOMS
ALLERGIC/IMMUNOLOGIC NEGATIVE: 1
GASTROINTESTINAL NEGATIVE: 1
COUGH: 1
EYE REDNESS: 0

## 2019-10-10 DIAGNOSIS — J33.9 NASAL POLYP: ICD-10-CM

## 2019-10-10 DIAGNOSIS — I10 ESSENTIAL HYPERTENSION: ICD-10-CM

## 2019-10-10 DIAGNOSIS — R05.3 CHRONIC COUGH: ICD-10-CM

## 2019-10-15 RX ORDER — METOPROLOL TARTRATE 50 MG/1
TABLET, FILM COATED ORAL
Qty: 90 TABLET | Refills: 4 | Status: SHIPPED | OUTPATIENT
Start: 2019-10-15

## 2019-10-15 RX ORDER — LORATADINE 10 MG/1
TABLET ORAL
Qty: 30 TABLET | Refills: 3 | Status: SHIPPED | OUTPATIENT
Start: 2019-10-15

## 2019-11-06 DIAGNOSIS — I10 ESSENTIAL HYPERTENSION: ICD-10-CM

## 2019-11-07 RX ORDER — HYDROCHLOROTHIAZIDE 50 MG/1
50 TABLET ORAL DAILY
Qty: 30 TABLET | Refills: 2 | Status: SHIPPED | OUTPATIENT
Start: 2019-11-07 | End: 2020-01-31 | Stop reason: SDUPTHER

## 2019-11-08 DIAGNOSIS — I10 ESSENTIAL HYPERTENSION: ICD-10-CM

## 2019-11-08 RX ORDER — HYDRALAZINE HYDROCHLORIDE 50 MG/1
TABLET, FILM COATED ORAL
Qty: 120 TABLET | Refills: 2 | Status: CANCELLED | OUTPATIENT
Start: 2019-11-08

## 2019-11-08 RX ORDER — HYDRALAZINE HYDROCHLORIDE 50 MG/1
TABLET, FILM COATED ORAL
Qty: 120 TABLET | Refills: 2 | Status: SHIPPED | OUTPATIENT
Start: 2019-11-08 | End: 2020-01-31 | Stop reason: SDUPTHER

## 2019-11-25 ENCOUNTER — HOSPITAL ENCOUNTER (EMERGENCY)
Age: 64
Discharge: HOME | End: 2019-11-25
Attending: EMERGENCY MEDICINE
Payer: MEDICARE

## 2019-11-25 VITALS
OXYGEN SATURATION: 98 % | SYSTOLIC BLOOD PRESSURE: 104 MMHG | RESPIRATION RATE: 17 BRPM | DIASTOLIC BLOOD PRESSURE: 57 MMHG | HEART RATE: 84 BPM | TEMPERATURE: 97.5 F

## 2019-11-25 DIAGNOSIS — L30.4 INTERTRIGO: Primary | ICD-10-CM

## 2019-11-25 PROCEDURE — 99282 EMERGENCY DEPT VISIT SF MDM: CPT

## 2019-11-25 RX ORDER — CLOTRIMAZOLE 1 %
CREAM (GRAM) TOPICAL
Qty: 1 TUBE | Refills: 0 | Status: SHIPPED | OUTPATIENT
Start: 2019-11-25 | End: 2019-12-02

## 2019-11-25 ASSESSMENT — PAIN DESCRIPTION - DESCRIPTORS: DESCRIPTORS: ITCHING

## 2019-11-25 ASSESSMENT — PAIN SCALES - GENERAL: PAINLEVEL_OUTOF10: 7

## 2019-11-25 ASSESSMENT — ENCOUNTER SYMPTOMS
WHEEZING: 0
NAUSEA: 0
COLOR CHANGE: 0
VOMITING: 0
COUGH: 0
ABDOMINAL PAIN: 0

## 2019-11-25 ASSESSMENT — PAIN DESCRIPTION - FREQUENCY: FREQUENCY: CONTINUOUS

## 2019-11-25 ASSESSMENT — PAIN DESCRIPTION - PAIN TYPE: TYPE: ACUTE PAIN

## 2019-11-25 ASSESSMENT — PAIN DESCRIPTION - LOCATION: LOCATION: HIP

## 2019-11-25 ASSESSMENT — PAIN DESCRIPTION - ORIENTATION: ORIENTATION: LEFT;LOWER

## 2019-11-26 ENCOUNTER — CARE COORDINATION (OUTPATIENT)
Dept: CARE COORDINATION | Age: 64
End: 2019-11-26

## 2019-12-05 RX ORDER — GLIPIZIDE 5 MG/1
TABLET ORAL
Qty: 30 TABLET | Refills: 5 | Status: SHIPPED | OUTPATIENT
Start: 2019-12-05 | End: 2020-05-18

## 2020-01-02 RX ORDER — ASPIRIN 81 MG/1
81 TABLET, CHEWABLE ORAL DAILY
Qty: 30 TABLET | Refills: 3 | Status: SHIPPED | OUTPATIENT
Start: 2020-01-02

## 2020-01-02 RX ORDER — LOSARTAN POTASSIUM 100 MG/1
TABLET ORAL
Qty: 28 TABLET | Refills: 3 | Status: SHIPPED | OUTPATIENT
Start: 2020-01-02

## 2020-01-02 RX ORDER — ATORVASTATIN CALCIUM 40 MG/1
40 TABLET, FILM COATED ORAL NIGHTLY
Qty: 30 TABLET | Refills: 3 | Status: SHIPPED | OUTPATIENT
Start: 2020-01-02

## 2020-01-02 RX ORDER — CITALOPRAM 10 MG/1
10 TABLET ORAL DAILY
Qty: 30 TABLET | Refills: 3 | Status: SHIPPED | OUTPATIENT
Start: 2020-01-02

## 2020-01-02 RX ORDER — AMLODIPINE BESYLATE 5 MG/1
5 TABLET ORAL DAILY
Qty: 30 TABLET | Refills: 3 | Status: SHIPPED | OUTPATIENT
Start: 2020-01-02

## 2020-01-02 RX ORDER — OMEPRAZOLE 20 MG/1
CAPSULE, DELAYED RELEASE ORAL
Qty: 60 CAPSULE | Refills: 3 | Status: SHIPPED | OUTPATIENT
Start: 2020-01-02

## 2020-01-08 RX ORDER — SITAGLIPTIN 50 MG/1
TABLET, FILM COATED ORAL
Qty: 30 TABLET | Refills: 1 | Status: SHIPPED | OUTPATIENT
Start: 2020-01-08 | End: 2020-02-26

## 2020-01-31 RX ORDER — HYDRALAZINE HYDROCHLORIDE 50 MG/1
TABLET, FILM COATED ORAL
Qty: 120 TABLET | Refills: 2 | Status: SHIPPED | OUTPATIENT
Start: 2020-01-31

## 2020-01-31 RX ORDER — HYDROCHLOROTHIAZIDE 50 MG/1
50 TABLET ORAL DAILY
Qty: 30 TABLET | Refills: 2 | Status: SHIPPED | OUTPATIENT
Start: 2020-01-31

## 2020-01-31 NOTE — TELEPHONE ENCOUNTER
Depression     Uncomplicated asthma     Onychomycosis     Sinus tarsi syndrome     Multinodular goiter (nontoxic)     Obesity (BMI 30-39. 9)     Vitamin D deficiency disease     Hyperlipidemia with target LDL less than 100     Hallux limitus, bilateral      Depressive disorder, not elsewhere classified     Edema of right lower extremity     Benign positional vertigo     Dizzy

## 2020-02-26 RX ORDER — SITAGLIPTIN 50 MG/1
TABLET, FILM COATED ORAL
Qty: 30 TABLET | Refills: 1 | Status: SHIPPED | OUTPATIENT
Start: 2020-02-26 | End: 2020-04-17

## 2020-02-26 NOTE — TELEPHONE ENCOUNTER
Last visit: 8/7/19  Last Med refill: 1/8/2020  Does patient have enough medication for 72 hours: NA    Next Visit Date:  Future Appointments   Date Time Provider [place]   3/26/2020 11:20 AM Yves Spencer MD [id number]   4/14/2020  3:30 PM Rich Dunlap MD [address] Maintenance   Topic Date Due    Hepatitis C screen  1955    DTaP/Tdap/Td vaccine (1 - Tdap) 10/26/1968    HIV screen  10/26/1972    Hepatitis B vaccine (1 of 3 - Risk 3-dose series) 10/26/1976    Shingles Vaccine (1 of 2) 10/26/2007    Diabetic foot exam  08/31/2016    Diabetic retinal exam  01/01/2017    Cervical cancer screen  10/24/2017    Diabetic microalbuminuria test  08/20/2019    Potassium monitoring  12/14/2019    Creatinine monitoring  12/14/2019    Lipid screen  02/13/2020    Colon Cancer Screen FIT/FOBT  03/21/2020    A1C test (Diabetic or Prediabetic)  08/07/2020    Breast cancer screen  06/06/2021    Flu vaccine  Completed    Pneumococcal 0-64 years Vaccine  Completed    Hepatitis A vaccine  Aged Out    Hib vaccine  Aged Out    Meningococcal (ACWY) vaccine  Aged Out       Hemoglobin A1C (%)   Date Value   08/07/2019 6.9   02/13/2019 5.9   02/06/2019 6.1             ( goal A1C is < 7)   Microalb/Crt.  Ratio (mcg/mg creat)   Date Value   08/20/2018 CANNOT BE CALCULATED     LDL Cholesterol (mg/dL)   Date Value   02/13/2019 78   05/01/2017 52       (goal LDL is <100)   AST (U/L)   Date Value   11/27/2018 31     ALT (U/L)   Date Value   11/27/2018 24     BUN (mg/dL)   Date Value   12/14/2018 13     BP Readings from Last 3 Encounters:   11/25/19 (!) 104/57   09/26/19 123/75   08/07/19 (!) 150/74          (goal 120/80)    All Future Testing planned in [facility]  Lab Frequency Next Occurrence   US Thyroid Once 09/06/2019   TSH Once 08/07/2019   Urinalysis Once 08/21/2019               Patient Active Problem List:     Type 2 diabetes mellitus with hyperosmolarity without coma,

## 2020-03-26 RX ORDER — ERGOCALCIFEROL 1.25 MG/1
CAPSULE ORAL
Qty: 4 CAPSULE | Refills: 5 | OUTPATIENT
Start: 2020-03-26

## 2020-04-17 RX ORDER — HYDROCHLOROTHIAZIDE 50 MG/1
TABLET ORAL
Qty: 30 TABLET | Refills: 2 | OUTPATIENT
Start: 2020-04-17

## 2020-04-17 RX ORDER — OMEPRAZOLE 20 MG/1
CAPSULE, DELAYED RELEASE ORAL
Qty: 60 CAPSULE | Refills: 3 | OUTPATIENT
Start: 2020-04-17

## 2020-04-17 RX ORDER — SITAGLIPTIN 50 MG/1
TABLET, FILM COATED ORAL
Qty: 30 TABLET | Refills: 3 | Status: SHIPPED | OUTPATIENT
Start: 2020-04-17

## 2020-04-17 RX ORDER — ASPIRIN 81 MG
TABLET,CHEWABLE ORAL
Qty: 30 TABLET | Refills: 3 | OUTPATIENT
Start: 2020-04-17

## 2020-04-17 RX ORDER — ERGOCALCIFEROL 1.25 MG/1
CAPSULE ORAL
Qty: 4 CAPSULE | Refills: 5 | OUTPATIENT
Start: 2020-04-17

## 2020-04-17 RX ORDER — CITALOPRAM 10 MG/1
TABLET ORAL
Qty: 30 TABLET | Refills: 3 | OUTPATIENT
Start: 2020-04-17

## 2020-04-17 RX ORDER — ATORVASTATIN CALCIUM 40 MG/1
TABLET, FILM COATED ORAL
Qty: 30 TABLET | Refills: 3 | OUTPATIENT
Start: 2020-04-17

## 2020-04-17 RX ORDER — AMLODIPINE BESYLATE 5 MG/1
TABLET ORAL
Qty: 30 TABLET | Refills: 3 | OUTPATIENT
Start: 2020-04-17

## 2020-04-17 RX ORDER — HYDRALAZINE HYDROCHLORIDE 50 MG/1
TABLET, FILM COATED ORAL
Qty: 120 TABLET | Refills: 2 | OUTPATIENT
Start: 2020-04-17

## 2020-04-17 NOTE — TELEPHONE ENCOUNTER
Last filled 2/26/2020 #30 with 1 RF  Last seen 8/7/2020    Next Visit Date:  Future Appointments   Date Time Provider [place]   7/20/2020  1:15 PM Kristie Turner MD [address] Maintenance   Topic Date Due    Hepatitis C screen  1955    HIV screen  10/26/1972    DTaP/Tdap/Td vaccine (1 - Tdap) 10/26/1976    Shingles Vaccine (1 of 2) 10/26/2007    Diabetic foot exam  08/31/2016    Diabetic retinal exam  01/01/2017    Cervical cancer screen  10/24/2017    Diabetic microalbuminuria test  08/20/2019    Potassium monitoring  12/14/2019    Creatinine monitoring  12/14/2019    Lipid screen  02/13/2020    Colon Cancer Screen FIT/FOBT  03/21/2020    A1C test (Diabetic or Prediabetic)  08/07/2020    Breast cancer screen  06/06/2021    Flu vaccine  Completed    Pneumococcal 0-64 years Vaccine  Completed    Hepatitis A vaccine  Aged Out    Hib vaccine  Aged Out    Meningococcal (ACWY) vaccine  Aged Out       Hemoglobin A1C (%)   Date Value   08/07/2019 6.9   02/13/2019 5.9   02/06/2019 6.1             ( goal A1C is < 7)   Microalb/Crt.  Ratio (mcg/mg creat)   Date Value   08/20/2018 CANNOT BE CALCULATED     LDL Cholesterol (mg/dL)   Date Value   02/13/2019 78   05/01/2017 52       (goal LDL is <100)   AST (U/L)   Date Value   11/27/2018 31     ALT (U/L)   Date Value   11/27/2018 24     BUN (mg/dL)   Date Value   12/14/2018 13     BP Readings from Last 3 Encounters:   11/25/19 (!) 104/57   09/26/19 123/75   08/07/19 (!) 150/74          (goal 120/80)    All Future Testing planned in [facility]  Lab Frequency Next Occurrence   US Thyroid Once 09/06/2019   TSH Once 08/07/2019   Urinalysis Once 08/21/2019               Patient Active Problem List:     Type 2 diabetes mellitus with hyperosmolarity without coma, without long-term current use of insulin (HCC)     HTN (hypertension)     Depression     Uncomplicated asthma     Onychomycosis     Sinus tarsi syndrome Multinodular goiter (nontoxic)     Obesity (BMI 30-39. 9)     Vitamin D deficiency disease     Hyperlipidemia with target LDL less than 100     Hallux limitus, bilateral      Depressive disorder, not elsewhere classified     Edema of right lower extremity     Benign positional vertigo     Dizzy

## 2020-07-15 RX ORDER — OMEPRAZOLE 20 MG/1
CAPSULE, DELAYED RELEASE ORAL
Qty: 60 CAPSULE | Refills: 3 | OUTPATIENT
Start: 2020-07-15

## 2020-08-19 ENCOUNTER — HOSPITAL ENCOUNTER (OUTPATIENT)
Dept: MAMMOGRAPHY | Age: 65
Discharge: HOME | End: 2020-08-21
Payer: MEDICARE

## 2020-08-19 PROCEDURE — 77063 BREAST TOMOSYNTHESIS BI: CPT

## 2020-08-22 ENCOUNTER — HOSPITAL ENCOUNTER (EMERGENCY)
Age: 65
Discharge: HOME | End: 2020-08-22
Attending: EMERGENCY MEDICINE
Payer: MEDICARE

## 2020-08-22 VITALS
RESPIRATION RATE: 16 BRPM | WEIGHT: 279.6 LBS | HEIGHT: 64 IN | OXYGEN SATURATION: 97 % | DIASTOLIC BLOOD PRESSURE: 64 MMHG | TEMPERATURE: 99.5 F | HEART RATE: 100 BPM | SYSTOLIC BLOOD PRESSURE: 147 MMHG | BODY MASS INDEX: 47.73 KG/M2

## 2020-08-22 PROCEDURE — 6370000000 HC RX 637 (ALT 250 FOR IP): Performed by: EMERGENCY MEDICINE

## 2020-08-22 PROCEDURE — 99282 EMERGENCY DEPT VISIT SF MDM: CPT

## 2020-08-22 RX ORDER — CLOTRIMAZOLE 1 %
CREAM (GRAM) TOPICAL 2 TIMES DAILY
Status: DISCONTINUED | OUTPATIENT
Start: 2020-08-22 | End: 2020-08-22 | Stop reason: HOSPADM

## 2020-08-22 RX ORDER — BACITRACIN, NEOMYCIN, POLYMYXIN B 400; 3.5; 5 [USP'U]/G; MG/G; [USP'U]/G
OINTMENT TOPICAL
Qty: 1 TUBE | Refills: 0 | Status: SHIPPED | OUTPATIENT
Start: 2020-08-22 | End: 2020-09-01

## 2020-08-22 RX ORDER — DIPHENHYDRAMINE HCL 25 MG
25 CAPSULE ORAL EVERY 6 HOURS PRN
Qty: 20 CAPSULE | Refills: 0 | Status: SHIPPED | OUTPATIENT
Start: 2020-08-22 | End: 2020-08-27

## 2020-08-22 RX ADMIN — CLOTRIMAZOLE: 10 CREAM TOPICAL at 15:03

## 2020-08-22 ASSESSMENT — ENCOUNTER SYMPTOMS
EYE DISCHARGE: 0
SHORTNESS OF BREATH: 0
BACK PAIN: 0
CHEST TIGHTNESS: 0
ABDOMINAL DISTENTION: 0
FACIAL SWELLING: 0
EYE PAIN: 0
ABDOMINAL PAIN: 0

## 2020-08-22 ASSESSMENT — PAIN DESCRIPTION - FREQUENCY: FREQUENCY: CONTINUOUS

## 2020-08-22 ASSESSMENT — PAIN SCALES - GENERAL: PAINLEVEL_OUTOF10: 4

## 2020-08-22 ASSESSMENT — PAIN DESCRIPTION - LOCATION: LOCATION: KNEE

## 2020-08-22 ASSESSMENT — PAIN DESCRIPTION - DESCRIPTORS: DESCRIPTORS: TENDER

## 2020-08-22 ASSESSMENT — PAIN DESCRIPTION - ORIENTATION: ORIENTATION: POSTERIOR;LEFT;RIGHT

## 2020-08-22 NOTE — ED PROVIDER NOTES
EMERGENCY DEPARTMENT ENCOUNTER    Pt Name: Galen Hodgkins  MRN: [id number]  [place] 1955  Date of evaluation: 8/22/20  CHIEF COMPLAINT       Chief Complaint   Patient presents with    Rash     behind both knees onset yesterday     HISTORY OF PRESENT ILLNESS   HPI   The patient is a 56-year-old female with a history of diabetes who presented to the emergency department secondary to rash behind both of her knees. She denied any new lotions, soaps, detergents or medications, however she never last 2 days after taking a shower the area is red and stings. No itching. No burning. Patient said her blood sugars have been slightly elevated at 189 and when this happens sometimes she will get a fungal infection. Patient is complaining of burning patient behind both needs. Said she knows that she is overweight and sometimes her annual folds will get inflamed and her blood sugars are elevated. Patient denied difficulty breathing, shortness of breath, nausea, vomiting, fevers or chills. REVIEW OF SYSTEMS     Review of Systems   Constitutional: Negative for chills, diaphoresis and fever. HENT: Negative for congestion, ear pain and facial swelling. Eyes: Negative for pain, discharge and visual disturbance. Respiratory: Negative for chest tightness and shortness of breath. Cardiovascular: Negative for chest pain and palpitations. Gastrointestinal: Negative for abdominal distention and abdominal pain. Genitourinary: Negative for difficulty urinating and flank pain. Musculoskeletal: Negative for back pain. Skin: Positive for rash. Negative for wound. Neurological: Negative for dizziness, light-headedness and headaches.      PASTMEDICAL HISTORY     Past Medical History:   Diagnosis Date    Anxiety     Asthma     CAD (coronary artery disease)     Depression     GERD (gastroesophageal reflux disease)     Hyperlipidemia     Hypertension     MI (myocardial infarction) ([facility] [address]     Obesity     Osteoarthritis     left knee    Sinus tarsi syndrome     Thyroid cyst     Type II or unspecified type diabetes mellitus without mention of complication, not stated as uncontrolled     Urinary incontinence     Vertigo      SURGICAL HISTORY       Past Surgical History:   Procedure Laterality Date    BREAST CYST EXCISION      HYSTERECTOMY       CURRENT MEDICATIONS       Previous Medications    ALBUTEROL SULFATE  (90 BASE) MCG/ACT INHALER    Inhale 2 puffs into the lungs every 6 hours as needed for Wheezing    ALCOHOL SWABS (ALCOHOL WIPES) 70 % PADS    Use up to 4 times a day when testing blood sugars    AMLODIPINE (NORVASC) 5 MG TABLET    Take 1 tablet by mouth daily    ASPIRIN (ASPIRIN LOW DOSE) 81 MG CHEWABLE TABLET    Take 1 tablet by mouth daily    ATORVASTATIN (LIPITOR) 40 MG TABLET    Take 1 tablet by mouth nightly    BECLOMETHASONE (QVAR) 80 MCG/ACT INHALER    Inhale 1 puff into the lungs 2 times daily    BIOTIN (BIOTIN 5000) 5 MG CAPS    Take by mouth    BLOOD GLUCOSE TEST STRIPS (TRUE METRIX BLOOD GLUCOSE TEST) STRIP    1 each by In Vitro route 2 times daily As needed. CITALOPRAM (CELEXA) 10 MG TABLET    Take 1 tablet by mouth daily    GLIPIZIDE (GLUCOTROL) 5 MG TABLET    TAKE ONE-HALF TABLET BY MOUTH TWO TIMES A DAY WITH BREAKFAST AND SUPPER. HYDRALAZINE (APRESOLINE) 50 MG TABLET    TAKE 1 TABLET BY MOUTH 4 TIMES A DAY    HYDROCHLOROTHIAZIDE (HYDRODIURIL) 50 MG TABLET    Take 1 tablet by mouth daily    JANUVIA 50 MG TABLET    TAKE 1 TABLET BY MOUTH ONE TIME A DAY    LANCETS MISC    1 each by Does not apply route daily Use 4 times daily Diagnisis:250.0  Diabetes Mellitus    LORATADINE (CLARITIN) 10 MG TABLET    TAKE 1 TABLET BY MOUTH ONE TIME A DAY    LOSARTAN (COZAAR) 100 MG TABLET    TAKE 1 TABLET BY MOUTH ONE TIME A DAY    METOPROLOL TARTRATE (LOPRESSOR) 50 MG TABLET    TAKE 1 & 1/2 TABLETS BY MOUTH TWICE DAILY    MICONAZOLE (ZEASORB-AF) 2 % POWDER    Apply topically 2 times daily. MULTIPLE VITAMINS-MINERALS (THERAPEUTIC MULTIVITAMIN-MINERALS) TABLET    Take 1 tablet by mouth daily    OMEPRAZOLE (PRILOSEC) 20 MG DELAYED RELEASE CAPSULE    TAKE ONE CAPSULE BY MOUTH TWICE A DAY    VITAMIN D (ERGOCALCIFEROL) 98899 UNITS CAPS CAPSULE    Take 1 capsule by mouth once a week     ALLERGIES     is allergic to amoxicillin and pcn [penicillins]. FAMILY HISTORY     She indicated that her mother is alive. She indicated that her father is . SOCIAL HISTORY       Social History     Tobacco Use    Smoking status: Never Smoker    Smokeless tobacco: Never Used   Substance Use Topics    Alcohol use: No     Alcohol/week: 0.0 standard drinks    Drug use: No     PHYSICAL EXAM     INITIAL VITALS: BP (!) 147/64   Pulse 100   Temp 99.5 °F (37.5 °C)   Resp 16   Ht 5' 4\" (1.626 m)   Wt 279 lb 9.6 oz (126.8 kg)   SpO2 97%   BMI 47.99 kg/m²    Physical Exam  Vitals signs and nursing note reviewed. Constitutional:       General: She is not in acute distress. Appearance: She is well-developed. She is not diaphoretic. HENT:      Head: Normocephalic and atraumatic. Eyes:      Pupils: Pupils are equal, round, and reactive to light. Neck:      Musculoskeletal: Normal range of motion and neck supple. Cardiovascular:      Rate and Rhythm: Normal rate and regular rhythm. Pulmonary:      Effort: Pulmonary effort is normal.      Breath sounds: Normal breath sounds. Abdominal:      General: Bowel sounds are normal.      Palpations: Abdomen is soft. Musculoskeletal: Normal range of motion. Skin:     General: Skin is warm. Capillary Refill: Capillary refill takes less than 2 seconds. Comments: Skin: Right posterior knee area erythematous raised consistent with tinea corporis similar findings behind the right posterior knee. Neurological:      Mental Status: She is alert and oriented to person, place, and time.          MEDICAL DECISION MAKING:   The patient is a 71-year-old female who presented to the emergency department secondary to rash. Physical examination consistent with tinea corporis. No petechia no purpura, no erythema. Patient will be given a prescription for clotrimazole. Given outpatient follow-up with primary care doctor and parameters to return to the emergency department. All patient's question's and concerns were answered prior to disposition and patient and/or family expressed understanding and agreement of treatment plan. CRITICAL CARE:              NIH STROKE SCALE:            PROCEDURES:    Procedures    DIAGNOSTIC RESULTS   EKG:All EKG's are interpreted by the Emergency Department Physician who either signs or Co-signs this chart in the absence of a cardiologist.        RADIOLOGY:All plain film, CT, MRI, and formal ultrasound images (except ED bedside ultrasound) are read by the radiologist, see reports below, unless otherwisenoted in MDM or here. No orders to display     LABS: All lab results were reviewed by myself, and all abnormals are listed below. Labs Reviewed - No data to display    EMERGENCY DEPARTMENTCOURSE:         Vitals:    Vitals:    08/22/20 1424 08/22/20 1436   BP:  (!) 147/64   Pulse:  100   Resp:  16   Temp:  99.5 °F (37.5 °C)   SpO2:  97%   Weight: 279 lb 9.6 oz (126.8 kg)    Height: 5' 4\" (1.626 m)        The patient was given the following medications while in the emergency department:  Orders Placed This Encounter   Medications    clotrimazole (LOTRIMIN) 1 % cream    diphenhydrAMINE (BENADRYL) 25 MG capsule     Sig: Take 1 capsule by mouth every 6 hours as needed for Itching     Dispense:  20 capsule     Refill:  0    neomycin-bacitracin-polymyxin (NEOSPORIN) 400-5-5000 ointment     Sig: Apply topically 2 times daily. Dispense:  1 Tube     Refill:  0     CONSULTS:  None    FINAL IMPRESSION      1.  Tinea corporis          DISPOSITION/PLAN   DISPOSITION Decision To Discharge 08/22/2020 02:37:39 PM      PATIENT REFERRED TO:  Shahzad Trejo MD  [place] [id number]  [phone]          DISCHARGE MEDICATIONS:  New Prescriptions    DIPHENHYDRAMINE (BENADRYL) 25 MG CAPSULE    Take 1 capsule by mouth every 6 hours as needed for Itching    NEOMYCIN-BACITRACIN-POLYMYXIN (NEOSPORIN) 400-5-5000 OINTMENT    Apply topically 2 times daily. Jagdish Miller MD  Attending Emergency Physician    This note was created with the assistance of a speech-recognition program. While intending to generate a document that actually reflects the content of the visit, no guarantees can be provided that every mistake has been identified and corrected by editing.                     Jagdish Miller MD  40/08/74 0201

## 2020-08-24 ENCOUNTER — CARE COORDINATION (OUTPATIENT)
Dept: CARE COORDINATION | Age: 65
End: 2020-08-24

## 2020-08-24 NOTE — CARE COORDINATION
provided contact information for future reference. Patient/family/caregiver given information for [employer] and agrees to enroll- no declined  Patient's preferred e-mail: n/a   Patient's preferred phone number: n/a  Based on Loop alert triggers, patient will be contacted by nurse care manager for worsening symptoms. Pt is applying the neosporin ointment 2 times a day and taking benadryl as needed. She will follow up with PCP within 3-4 days if no improvement. Pt does not have any other concerns and declined LOOP today.

## 2020-09-08 ENCOUNTER — CARE COORDINATION (OUTPATIENT)
Dept: CARE COORDINATION | Age: 65
End: 2020-09-08

## 2020-09-08 NOTE — CARE COORDINATION
2 week ER follow up, attempt to reach patient. There was no answer. A message was left to have patient call back. Office number left. [phone].

## 2020-11-13 ENCOUNTER — OFFICE VISIT (OUTPATIENT)
Dept: PRIMARY CARE CLINIC | Age: 65
End: 2020-11-13
Payer: MEDICARE

## 2020-11-13 VITALS — HEART RATE: 84 BPM | OXYGEN SATURATION: 96 % | WEIGHT: 279 LBS | BODY MASS INDEX: 47.89 KG/M2 | TEMPERATURE: 98.4 F

## 2020-11-13 PROCEDURE — G8427 DOCREV CUR MEDS BY ELIG CLIN: HCPCS | Performed by: NURSE PRACTITIONER

## 2020-11-13 PROCEDURE — G8417 CALC BMI ABV UP PARAM F/U: HCPCS | Performed by: NURSE PRACTITIONER

## 2020-11-13 PROCEDURE — 1036F TOBACCO NON-USER: CPT | Performed by: NURSE PRACTITIONER

## 2020-11-13 PROCEDURE — 3017F COLORECTAL CA SCREEN DOC REV: CPT | Performed by: NURSE PRACTITIONER

## 2020-11-13 PROCEDURE — 99213 OFFICE O/P EST LOW 20 MIN: CPT | Performed by: NURSE PRACTITIONER

## 2020-11-13 PROCEDURE — G8484 FLU IMMUNIZE NO ADMIN: HCPCS | Performed by: NURSE PRACTITIONER

## 2020-11-13 ASSESSMENT — PATIENT HEALTH QUESTIONNAIRE - PHQ9
SUM OF ALL RESPONSES TO PHQ QUESTIONS 1-9: 1
1. LITTLE INTEREST OR PLEASURE IN DOING THINGS: 0
SUM OF ALL RESPONSES TO PHQ QUESTIONS 1-9: 1
SUM OF ALL RESPONSES TO PHQ9 QUESTIONS 1 & 2: 1
2. FEELING DOWN, DEPRESSED OR HOPELESS: 1
SUM OF ALL RESPONSES TO PHQ QUESTIONS 1-9: 1

## 2020-11-13 NOTE — PATIENT INSTRUCTIONS
You were tested for COVID today. We will call you with results when they are available. If you have not heard from us in 7 days, please call our office. Patient was evaluated carside today during this pandemic covid 19 situation. Quarantine as directed  Await results  Increase fluids- stay hydrated  Good handwashing  Supportive care as discussed    If having symptoms- you need to be fever free for 24 hours, other symptoms resolved and at least 10 days passed since first symptom appeared before discontinuing  quarantine- CDC guidelines    tylenol as directed as needed over the counter if able to take  go to the ER for chest pain, short of breath, hard time breathing, persistent vomiting, feeling weaker or dehydrated, any worsening, change or concern  Follow up with primary care for re evaluation  [place] covid 19 call center - [phone]- [phone]  Another good resource for information is coronavirus. [place].gov    If exposure, it is recommended 14 day quarantine

## 2020-11-13 NOTE — PROGRESS NOTES
[address]  [address] [facility] [address] [address]  Dept: [phone]  Dept Fax: [phone]    Constance Ireland a 63 y.o. female who presents to the urgent care today for her medical conditions/complaintsas noted below. Arabellabunny Villanueva is c/o of Concern For COVID-19 (cough, fever x 4 days; known exposure )      HPI:     Cc cough, fever for 4 days  Has known covid 19 exposure   is in the hospital with covid 19  She states she feels pretty good  Not coughing too bad    Cough   This is a new problem. Episode onset: 4 days. The problem has been waxing and waning. The cough is non-productive. Associated symptoms include a fever. Pertinent negatives include no chest pain, rash or wheezing. Nothing aggravates the symptoms. She has tried nothing for the symptoms. Past Medical History:   Diagnosis Date    Anxiety     Asthma     CAD (coronary artery disease)     Depression     GERD (gastroesophageal reflux disease)     Hyperlipidemia     Hypertension     MI (myocardial infarction) ([address]     Obesity     Osteoarthritis     left knee    Sinus tarsi syndrome     Thyroid cyst     Type II or unspecified type diabetes mellitus without mention of complication, not stated as uncontrolled     Urinary incontinence     Vertigo        Current Outpatient Medications   Medication Sig Dispense Refill    glipiZIDE (GLUCOTROL) 5 MG tablet TAKE ONE-HALF TABLET BY MOUTH TWO TIMES A DAY WITH BREAKFAST AND SUPPER.  30 tablet 3    JANUVIA 50 MG tablet TAKE 1 TABLET BY MOUTH ONE TIME A DAY 30 tablet 3    hydrALAZINE (APRESOLINE) 50 MG tablet TAKE 1 TABLET BY MOUTH 4 TIMES A  tablet 2    hydrochlorothiazide (HYDRODIURIL) 50 MG tablet Take 1 tablet by mouth daily 30 tablet 2    losartan (COZAAR) 100 MG tablet TAKE 1 TABLET BY MOUTH ONE TIME A DAY 28 tablet 3    amLODIPine (NORVASC) 5 MG tablet Take 1 tablet by mouth daily 30 tablet 3    omeprazole (PRILOSEC) 20 MG delayed release capsule TAKE ONE CAPSULE BY MOUTH TWICE A DAY 60 capsule 3    atorvastatin (LIPITOR) 40 MG tablet Take 1 tablet by mouth nightly 30 tablet 3    citalopram (CELEXA) 10 MG tablet Take 1 tablet by mouth daily 30 tablet 3    aspirin (ASPIRIN LOW DOSE) 81 MG chewable tablet Take 1 tablet by mouth daily 30 tablet 3    miconazole (ZEASORB-AF) 2 % powder Apply topically 2 times daily. 45 g 1    loratadine (CLARITIN) 10 MG tablet TAKE 1 TABLET BY MOUTH ONE TIME A DAY 30 tablet 3    metoprolol tartrate (LOPRESSOR) 50 MG tablet TAKE 1 & 1/2 TABLETS BY MOUTH TWICE DAILY 90 tablet 4    vitamin D (ERGOCALCIFEROL) 02875 units CAPS capsule Take 1 capsule by mouth once a week 4 capsule 5    Biotin (BIOTIN 5000) 5 MG CAPS Take by mouth      beclomethasone (QVAR) 80 MCG/ACT inhaler Inhale 1 puff into the lungs 2 times daily 1 Inhaler 3    albuterol sulfate  (90 Base) MCG/ACT inhaler Inhale 2 puffs into the lungs every 6 hours as needed for Wheezing 1 Inhaler 3    blood glucose test strips (TRUE METRIX BLOOD GLUCOSE TEST) strip 1 each by In Vitro route 2 times daily As needed. 100 each 5    Alcohol Swabs (ALCOHOL WIPES) 70 % PADS Use up to 4 times a day when testing blood sugars 100 each 5    Lancets MISC 1 each by Does not apply route daily Use 4 times daily Diagnisis:250.0  Diabetes Mellitus 100 each 11    Multiple Vitamins-Minerals (THERAPEUTIC MULTIVITAMIN-MINERALS) tablet Take 1 tablet by mouth daily 30 tablet 11     No current facility-administered medications for this visit. Allergies   Allergen Reactions    Amoxicillin     Pcn [Penicillins] Hives and Itching       Subjective:     Review of Systems   Constitutional: Positive for fever. Respiratory: Positive for cough. Negative for wheezing. Cardiovascular: Negative for chest pain. Gastrointestinal: Negative for diarrhea and vomiting. Skin: Negative for rash.        Objective:      Physical Exam  Vitals signs and nursing note reviewed. Constitutional:       General: She is not in acute distress. Appearance: Normal appearance. She is well-developed. She is not ill-appearing, toxic-appearing or diaphoretic. HENT:      Head: Normocephalic and atraumatic. Nose: Nose normal.      Mouth/Throat:      Mouth: Mucous membranes are moist.   Eyes:      General: No scleral icterus. Right eye: No discharge. Left eye: No discharge. Neck:      Musculoskeletal: Normal range of motion and neck supple. No neck rigidity. Cardiovascular:      Rate and Rhythm: Normal rate and regular rhythm. Heart sounds: Normal heart sounds. Pulmonary:      Effort: Pulmonary effort is normal. No respiratory distress. Breath sounds: Normal breath sounds. No stridor. No wheezing, rhonchi or rales. Abdominal:      General: Bowel sounds are normal.      Palpations: Abdomen is soft. Tenderness: There is no abdominal tenderness. Musculoskeletal: Normal range of motion. General: No signs of injury. Skin:     General: Skin is warm and dry. Coloration: Skin is not jaundiced or pale. Findings: No erythema or rash. Neurological:      General: No focal deficit present. Mental Status: She is alert and oriented to person, place, and time. Psychiatric:         Mood and Affect: Mood normal.         Behavior: Behavior normal.       Pulse 84   Temp 98.4 °F (36.9 °C) (Infrared)   Wt 279 lb (126.6 kg)   SpO2 96%   BMI 47.89 kg/m²     Assessment:          Diagnosis Orders   1. Suspected COVID-19 virus infection  COVID-19 Ambulatory   2. Exposure to COVID-19 virus  COVID-19 Ambulatory       Plan: You were tested for COVID today. We will call you with results when they are available. If you have not heard from us in 7 days, please call our office. Patient was evaluated carside today during this pandemic covid 19 situation.     Quarantine as directed  Await results  Increase fluids- stay hydrated  Good handwashing  Supportive care as discussed    If having symptoms- you need to be fever free for 24 hours, other symptoms resolved and at least 10 days passed since first symptom appeared before discontinuing  quarantine- CDC guidelines    tylenol as directed as needed over the counter if able to take  go to the ER for chest pain, short of breath, hard time breathing, persistent vomiting, feeling weaker or dehydrated, any worsening, change or concern  Follow up with primary care for re evaluation  [place] covid 19 [facility] - [phone] (1-)  Another good resource for information is coronavirus. [place]    If exposure, it is recommended 14 day   Return for follow up with primary care in 7 days, go to the ER for worsening, change or concern. Patient given educational materials - see patientinstructions. Discussed use, benefit, and side effects of prescribed medications. All patient questions answered. Pt voiced understanding.     Electronically signed by SHAYY Oliva 11/13/2020 at 2:43 PM

## 2020-11-14 ENCOUNTER — HOSPITAL ENCOUNTER (OUTPATIENT)
Age: 65
Setting detail: SPECIMEN
Discharge: HOME | End: 2020-11-14
Payer: MEDICARE

## 2020-11-14 ASSESSMENT — ENCOUNTER SYMPTOMS
WHEEZING: 0
COUGH: 1
VOMITING: 0
DIARRHEA: 0

## 2020-11-18 LAB — SARS-COV-2, NAA: NOT DETECTED

## 2021-01-13 ENCOUNTER — TELEPHONE (OUTPATIENT)
Dept: FAMILY MEDICINE CLINIC | Age: 66
End: 2021-01-13

## 2021-01-13 NOTE — TELEPHONE ENCOUNTER
Patient was calling trying to get evita with Dr. Nicholas Portillo, explain not our patient, would need to contact PCP and see if can be referred to a therapist.

## 2021-01-23 ENCOUNTER — HOSPITAL ENCOUNTER (EMERGENCY)
Age: 66
Discharge: HOME | End: 2021-01-23
Attending: EMERGENCY MEDICINE
Payer: MEDICARE

## 2021-01-23 ENCOUNTER — APPOINTMENT (OUTPATIENT)
Dept: CT IMAGING | Age: 66
End: 2021-01-23
Payer: MEDICARE

## 2021-01-23 VITALS
DIASTOLIC BLOOD PRESSURE: 71 MMHG | TEMPERATURE: 98.2 F | HEIGHT: 64 IN | OXYGEN SATURATION: 98 % | WEIGHT: 260 LBS | SYSTOLIC BLOOD PRESSURE: 146 MMHG | BODY MASS INDEX: 44.39 KG/M2 | RESPIRATION RATE: 18 BRPM | HEART RATE: 91 BPM

## 2021-01-23 DIAGNOSIS — R51.9 ACUTE NONINTRACTABLE HEADACHE, UNSPECIFIED HEADACHE TYPE: ICD-10-CM

## 2021-01-23 DIAGNOSIS — I10 HYPERTENSION, UNSPECIFIED TYPE: Primary | ICD-10-CM

## 2021-01-23 DIAGNOSIS — R77.8 ELEVATED TROPONIN: ICD-10-CM

## 2021-01-23 LAB
ABSOLUTE EOS #: 0.32 K/UL (ref 0–0.44)
ABSOLUTE IMMATURE GRANULOCYTE: 0.01 K/UL (ref 0–0.3)
ABSOLUTE LYMPH #: 1.96 K/UL (ref 1.1–3.7)
ABSOLUTE MONO #: 0.73 K/UL (ref 0.1–1.2)
ANION GAP SERPL CALCULATED.3IONS-SCNC: 11 MMOL/L (ref 9–17)
BASOPHILS # BLD: 2 % (ref 0–2)
BASOPHILS ABSOLUTE: 0.09 K/UL (ref 0–0.2)
BUN BLDV-MCNC: 14 MG/DL (ref 8–23)
BUN/CREAT BLD: 23 (ref 9–20)
CALCIUM SERPL-MCNC: 9.6 MG/DL (ref 8.6–10.4)
CHLORIDE BLD-SCNC: 105 MMOL/L (ref 98–107)
CO2: 24 MMOL/L (ref 20–31)
CREAT SERPL-MCNC: 0.61 MG/DL (ref 0.5–0.9)
DIFFERENTIAL TYPE: ABNORMAL
EOSINOPHILS RELATIVE PERCENT: 5 % (ref 1–4)
GFR AFRICAN AMERICAN: >60 ML/MIN
GFR NON-AFRICAN AMERICAN: >60 ML/MIN
GFR SERPL CREATININE-BSD FRML MDRD: ABNORMAL ML/MIN/{1.73_M2}
GFR SERPL CREATININE-BSD FRML MDRD: ABNORMAL ML/MIN/{1.73_M2}
GLUCOSE BLD-MCNC: 94 MG/DL (ref 70–99)
HCT VFR BLD CALC: 38.6 % (ref 36.3–47.1)
HEMOGLOBIN: 12.4 G/DL (ref 11.9–15.1)
IMMATURE GRANULOCYTES: 0 %
LYMPHOCYTES # BLD: 32 % (ref 24–43)
MCH RBC QN AUTO: 27.2 PG (ref 25.2–33.5)
MCHC RBC AUTO-ENTMCNC: 32.1 G/DL (ref 28.4–34.8)
MCV RBC AUTO: 84.6 FL (ref 82.6–102.9)
MONOCYTES # BLD: 12 % (ref 3–12)
NRBC AUTOMATED: 0 PER 100 WBC
PDW BLD-RTO: 17.2 % (ref 11.8–14.4)
PLATELET # BLD: 245 K/UL (ref 138–453)
PLATELET ESTIMATE: ABNORMAL
PMV BLD AUTO: 11.4 FL (ref 8.1–13.5)
POTASSIUM SERPL-SCNC: 4.2 MMOL/L (ref 3.7–5.3)
RBC # BLD: 4.56 M/UL (ref 3.95–5.11)
RBC # BLD: ABNORMAL 10*6/UL
SEG NEUTROPHILS: 49 % (ref 36–65)
SEGMENTED NEUTROPHILS ABSOLUTE COUNT: 2.98 K/UL (ref 1.5–8.1)
SODIUM BLD-SCNC: 140 MMOL/L (ref 135–144)
TROPONIN INTERP: ABNORMAL
TROPONIN INTERP: ABNORMAL
TROPONIN T: ABNORMAL NG/ML
TROPONIN T: ABNORMAL NG/ML
TROPONIN, HIGH SENSITIVITY: 29 NG/L (ref 0–14)
TROPONIN, HIGH SENSITIVITY: 29 NG/L (ref 0–14)
WBC # BLD: 6.1 K/UL (ref 3.5–11.3)
WBC # BLD: ABNORMAL 10*3/UL

## 2021-01-23 PROCEDURE — 85025 COMPLETE CBC W/AUTO DIFF WBC: CPT

## 2021-01-23 PROCEDURE — 99283 EMERGENCY DEPT VISIT LOW MDM: CPT

## 2021-01-23 PROCEDURE — 70450 CT HEAD/BRAIN W/O DYE: CPT

## 2021-01-23 PROCEDURE — 80048 BASIC METABOLIC PNL TOTAL CA: CPT

## 2021-01-23 PROCEDURE — 6370000000 HC RX 637 (ALT 250 FOR IP): Performed by: EMERGENCY MEDICINE

## 2021-01-23 PROCEDURE — 93005 ELECTROCARDIOGRAM TRACING: CPT | Performed by: EMERGENCY MEDICINE

## 2021-01-23 PROCEDURE — 84484 ASSAY OF TROPONIN QUANT: CPT

## 2021-01-23 RX ORDER — ACETAMINOPHEN 325 MG/1
650 TABLET ORAL ONCE
Status: COMPLETED | OUTPATIENT
Start: 2021-01-23 | End: 2021-01-23

## 2021-01-23 RX ADMIN — ACETAMINOPHEN 650 MG: 325 TABLET ORAL at 16:17

## 2021-01-23 ASSESSMENT — ENCOUNTER SYMPTOMS
SHORTNESS OF BREATH: 0
TROUBLE SWALLOWING: 0
ABDOMINAL PAIN: 0
VOMITING: 0

## 2021-01-23 ASSESSMENT — PAIN DESCRIPTION - LOCATION: LOCATION: HEAD

## 2021-01-23 ASSESSMENT — PAIN SCALES - GENERAL: PAINLEVEL_OUTOF10: 7

## 2021-01-23 ASSESSMENT — PAIN DESCRIPTION - DESCRIPTORS: DESCRIPTORS: ACHING

## 2021-01-23 NOTE — ED NOTES
Pt came to ED c/o HA and HBP. Pt stated that she went to her local [facility] when the headache started and her blood pressure read 184/97 so she was advised to come to the ED. On arrival her BP was 153/80 and on recheck it was 146/71. Pt does not appear to be distressed, she is ambulatory and breathing in  Non-labored.      Alec Chin RN  01/23/21 [id number]

## 2021-01-23 NOTE — ED PROVIDER NOTES
EMERGENCY DEPARTMENT ENCOUNTER    Pt Name: Justin Valerio  MRN: [id number]  [place] 1955  Date of evaluation: 1/23/21  CHIEF COMPLAINT       Chief Complaint   Patient presents with    Hypertension    Headache     HISTORY OF PRESENT ILLNESS   Patient is a 61-year-old female with history of hypertension, CAD, MI, diabetes, vertigo will be states she is having a headache and high blood pressure. She states she was at [facility] today and it was in the 808 systolic. States she has been compliant with her medications and has all of her antihypertensives. On anticoagulation. No history of brain aneurysm. States that headache started today at [facility] around 1 PM not sudden onset its been gradual not the worst of her life states she has had them in the past with high blood pressure. Denies any falls or head injury. Denies any neck pain or stiffness. Denies any blurred or double vision trouble speech focal weakness numbness tingling. She does states she had some dizziness few days ago but she has vertigo. She is unsure if her blood sugars been high. Denies any chest pain shortness of breath abdominal pain fevers chills vomiting or nausea. REVIEW OF SYSTEMS     Review of Systems   Constitutional: Negative for chills and fever. HENT: Negative for trouble swallowing. Eyes: Negative for visual disturbance. Respiratory: Negative for shortness of breath. Cardiovascular: Negative for chest pain. Gastrointestinal: Negative for abdominal pain and vomiting. Genitourinary: Negative for difficulty urinating. Musculoskeletal: Negative for neck pain. Skin: Negative for rash. Neurological: Positive for dizziness and headaches. Negative for weakness. Psychiatric/Behavioral: Negative for confusion.      PASTMEDICAL HISTORY     Past Medical History:   Diagnosis Date    Anxiety     Asthma     CAD (coronary artery disease)     Depression     GERD (gastroesophageal reflux disease)     Hyperlipidemia  Hypertension     MI [facility]my[facility] infarction) ([facility] [address]     Obesity     Osteoarthritis     left knee    Sinus tarsi syndrome     Thyroid cyst     Type II or unspecified type diabetes mellitus without mention of complication, not stated as uncontrolled     Urinary incontinence     Vertigo      SURGICAL HISTORY       Past Surgical History:   Procedure Laterality Date    BREAST CYST EXCISION      HYSTERECTOMY       CURRENT MEDICATIONS       Previous Medications    ALBUTEROL SULFATE  (90 BASE) MCG/ACT INHALER    Inhale 2 puffs into the lungs every 6 hours as needed for Wheezing    ALCOHOL SWABS (ALCOHOL WIPES) 70 % PADS    Use up to 4 times a day when testing blood sugars    AMLODIPINE (NORVASC) 5 MG TABLET    Take 1 tablet by mouth daily    ASPIRIN (ASPIRIN LOW DOSE) 81 MG CHEWABLE TABLET    Take 1 tablet by mouth daily    ATORVASTATIN (LIPITOR) 40 MG TABLET    Take 1 tablet by mouth nightly    BECLOMETHASONE (QVAR) 80 MCG/ACT INHALER    Inhale 1 puff into the lungs 2 times daily    BIOTIN (BIOTIN 5000) 5 MG CAPS    Take by mouth    BLOOD GLUCOSE TEST STRIPS (TRUE METRIX BLOOD GLUCOSE TEST) STRIP    1 each by In Vitro route 2 times daily As needed. CITALOPRAM (CELEXA) 10 MG TABLET    Take 1 tablet by mouth daily    GLIPIZIDE (GLUCOTROL) 5 MG TABLET    TAKE ONE-HALF TABLET BY MOUTH TWO TIMES A DAY WITH BREAKFAST AND SUPPER.     HYDRALAZINE (APRESOLINE) 50 MG TABLET    TAKE 1 TABLET BY MOUTH 4 TIMES A DAY    HYDROCHLOROTHIAZIDE (HYDRODIURIL) 50 MG TABLET    Take 1 tablet by mouth daily    JANUVIA 50 MG TABLET    TAKE 1 TABLET BY MOUTH ONE TIME A DAY    LANCETS MISC    1 each by Does not apply route daily Use 4 times daily Diagnisis:250.0  Diabetes Mellitus    LORATADINE (CLARITIN) 10 MG TABLET    TAKE 1 TABLET BY MOUTH ONE TIME A DAY    LOSARTAN (COZAAR) 100 MG TABLET    TAKE 1 TABLET BY MOUTH ONE TIME A DAY    METOPROLOL TARTRATE (LOPRESSOR) 50 MG TABLET    TAKE 1 & 1/2 TABLETS BY MOUTH TWICE DAILY MICONAZOLE (ZEASORB-AF) 2 % POWDER    Apply topically 2 times daily. MULTIPLE VITAMINS-MINERALS (THERAPEUTIC MULTIVITAMIN-MINERALS) TABLET    Take 1 tablet by mouth daily    NONFORMULARY    Sea [id number] as directed    OMEPRAZOLE (PRILOSEC) 20 MG DELAYED RELEASE CAPSULE    TAKE ONE CAPSULE BY MOUTH TWICE A DAY    VITAMIN D (ERGOCALCIFEROL) 01002 UNITS CAPS CAPSULE    Take 1 capsule by mouth once a week     ALLERGIES     is allergic to amoxicillin and pcn [penicillins]. FAMILY HISTORY     She indicated that her mother is alive. She indicated that her father is . SOCIAL HISTORY       Social History     Tobacco Use    Smoking status: Never Smoker    Smokeless tobacco: Never Used   Substance Use Topics    Alcohol use: No     Alcohol/week: 0.0 standard drinks    Drug use: No     PHYSICAL EXAM     INITIAL VITALS: BP (!) 146/71   Pulse 91   Temp 98.2 °F (36.8 °C)   Resp 18   Ht 5' 4\" (1.626 m)   Wt 260 lb (117.9 kg)   SpO2 98%   BMI 44.63 kg/m²    Physical Exam  Vitals signs and nursing note reviewed. Constitutional:       General: She is not in acute distress. Appearance: She is obese. She is not ill-appearing. HENT:      Head: Normocephalic and atraumatic. Mouth/Throat:      Mouth: Mucous membranes are moist.      Pharynx: Oropharynx is clear. Eyes:      General: No scleral icterus. Extraocular Movements: Extraocular movements intact. Pupils: Pupils are equal, round, and reactive to light. Neck:      Musculoskeletal: Normal range of motion and neck supple. No neck rigidity. Vascular: No carotid bruit. Cardiovascular:      Rate and Rhythm: Normal rate and regular rhythm. Pulses: Normal pulses. Heart sounds: Normal heart sounds. No murmur. No friction rub. No gallop. Pulmonary:      Effort: Pulmonary effort is normal. No respiratory distress. Breath sounds: Normal breath sounds. Abdominal:      General: There is no distension.       Palpations: changes    RADIOLOGY:All plain film, CT, MRI, and formal ultrasound images (except ED bedside ultrasound) are read by the radiologist, see reports below, unless otherwisenoted in MDM or here. CT HEAD WO CONTRAST   Final Result   No acute intracranial abnormality. LABS: All lab results were reviewed by myself, and all abnormals are listed below. Labs Reviewed   CBC WITH AUTO DIFFERENTIAL - Abnormal; Notable for the following components:       Result Value    RDW 17.2 (*)     Eosinophils % 5 (*)     All other components within normal limits   BASIC METABOLIC PANEL - Abnormal; Notable for the following components:    Bun/Cre Ratio 23 (*)     All other components within normal limits   TROPONIN - Abnormal; Notable for the following components:    Troponin, High Sensitivity 29 (*)     All other components within normal limits   TROPONIN - Abnormal; Notable for the following components:    Troponin, High Sensitivity 29 (*)     All other components within normal limits       EMERGENCY DEPARTMENTCOURSE:     Patient is a 30-year-old female here with headache and high blood pressure. She is on several antihypertensives. Denies any falls not on anticoagulation. She states her headache is 7 out of 10 currently, was gradual in onset not the worst of her life. Neurologically intact. Repeat blood pressures have improved from before. No stroke deficits. Will check cardiac work-up, CT head, treat her headache and reassess. On reassessment patient states she is not having any dizziness and her headaches completely gone. Her CT is negative. Her blood work does show elevated troponin at 29 and this was rechecked. Her EKG shows no signs of ischemia. She is chest pain-free. No shortness of breath. She states she would like to go home. Her repeat blood pressures have improved. Informed discharge at this time with strict return precautions and follow-up with her PCP as soon as possible.     Vitals:    Vitals: 01/23/21 1423 01/23/21 1726 01/23/21 1832   BP: (!) 153/80 (!) 146/71    Pulse: 91     Resp: 18     Temp:   98.2 °F (36.8 °C)   SpO2: 98%     Weight: 260 lb (117.9 kg)     Height: 5' 4\" (1.626 m)         The patient was given the following medications while in the emergency department:  Orders Placed This Encounter   Medications    acetaminophen (TYLENOL) tablet 650 mg     CONSULTS:  None    FINAL IMPRESSION      1. Hypertension, unspecified type    2. Acute nonintractable headache, unspecified headache type    3. Elevated troponin          DISPOSITION/PLAN   DISPOSITION Decision To Discharge 01/23/2021 06:28:26 PM      PATIENT REFERRED TO:  Rojas Olivo, [address]  [phone]    Schedule an appointment as soon as possible for a visit       [facility] ED  [address] [facility]  [phone]    If symptoms worsen    DISCHARGE MEDICATIONS:  New Prescriptions    No medications on file     Issac Ackerman MD  Attending Emergency Physician    This note was created with the assistance of a speech-recognition program. While intending to generate a document that actually reflects the content of the visit, no guarantees can be provided that every mistake has been identified and corrected by editing.                     Issac Ackerman MD  01/23/21 8026

## 2021-01-25 LAB
EKG ATRIAL RATE: 70 BPM
EKG P AXIS: 7 DEGREES
EKG P-R INTERVAL: 180 MS
EKG Q-T INTERVAL: 440 MS
EKG QRS DURATION: 92 MS
EKG QTC CALCULATION (BAZETT): 475 MS
EKG R AXIS: -26 DEGREES
EKG T AXIS: 14 DEGREES
EKG VENTRICULAR RATE: 70 BPM

## 2021-01-25 PROCEDURE — 93010 ELECTROCARDIOGRAM REPORT: CPT | Performed by: INTERNAL MEDICINE

## 2021-07-07 ENCOUNTER — APPOINTMENT (OUTPATIENT)
Dept: CT IMAGING | Age: 66
End: 2021-07-07
Payer: MEDICARE

## 2021-07-07 ENCOUNTER — HOSPITAL ENCOUNTER (EMERGENCY)
Age: 66
Discharge: HOME | End: 2021-07-07
Attending: EMERGENCY MEDICINE
Payer: MEDICARE

## 2021-07-07 VITALS
SYSTOLIC BLOOD PRESSURE: 144 MMHG | RESPIRATION RATE: 17 BRPM | WEIGHT: 260 LBS | BODY MASS INDEX: 44.39 KG/M2 | TEMPERATURE: 98.5 F | HEIGHT: 64 IN | DIASTOLIC BLOOD PRESSURE: 83 MMHG | HEART RATE: 72 BPM | OXYGEN SATURATION: 97 %

## 2021-07-07 DIAGNOSIS — H81.10 BENIGN PAROXYSMAL POSITIONAL VERTIGO, UNSPECIFIED LATERALITY: Primary | ICD-10-CM

## 2021-07-07 DIAGNOSIS — R42 DIZZINESS: ICD-10-CM

## 2021-07-07 LAB
ABSOLUTE EOS #: 0.24 K/UL (ref 0–0.44)
ABSOLUTE IMMATURE GRANULOCYTE: 0.01 K/UL (ref 0–0.3)
ABSOLUTE LYMPH #: 1.58 K/UL (ref 1.1–3.7)
ABSOLUTE MONO #: 0.62 K/UL (ref 0.1–1.2)
ALBUMIN SERPL-MCNC: 4 G/DL (ref 3.5–5.2)
ALBUMIN/GLOBULIN RATIO: ABNORMAL (ref 1–2.5)
ALP BLD-CCNC: 52 U/L (ref 35–104)
ALT SERPL-CCNC: 25 U/L (ref 5–33)
ANION GAP SERPL CALCULATED.3IONS-SCNC: 12 MMOL/L (ref 9–17)
AST SERPL-CCNC: 29 U/L
BASOPHILS # BLD: 1 % (ref 0–2)
BASOPHILS ABSOLUTE: 0.06 K/UL (ref 0–0.2)
BILIRUB SERPL-MCNC: 0.25 MG/DL (ref 0.3–1.2)
BUN BLDV-MCNC: 16 MG/DL (ref 8–23)
BUN/CREAT BLD: 25 (ref 9–20)
CALCIUM SERPL-MCNC: 9.4 MG/DL (ref 8.6–10.4)
CHLORIDE BLD-SCNC: 105 MMOL/L (ref 98–107)
CO2: 23 MMOL/L (ref 20–31)
CREAT SERPL-MCNC: 0.63 MG/DL (ref 0.5–0.9)
DIFFERENTIAL TYPE: ABNORMAL
EKG ATRIAL RATE: 72 BPM
EKG P AXIS: -28 DEGREES
EKG P-R INTERVAL: 158 MS
EKG Q-T INTERVAL: 410 MS
EKG QRS DURATION: 74 MS
EKG QTC CALCULATION (BAZETT): 448 MS
EKG R AXIS: -34 DEGREES
EKG T AXIS: -18 DEGREES
EKG VENTRICULAR RATE: 72 BPM
EOSINOPHILS RELATIVE PERCENT: 4 % (ref 1–4)
GFR AFRICAN AMERICAN: >60 ML/MIN
GFR NON-AFRICAN AMERICAN: >60 ML/MIN
GFR SERPL CREATININE-BSD FRML MDRD: ABNORMAL ML/MIN/{1.73_M2}
GFR SERPL CREATININE-BSD FRML MDRD: ABNORMAL ML/MIN/{1.73_M2}
GLUCOSE BLD-MCNC: 161 MG/DL (ref 70–99)
HCT VFR BLD CALC: 38.1 % (ref 36.3–47.1)
HEMOGLOBIN: 12.6 G/DL (ref 11.9–15.1)
IMMATURE GRANULOCYTES: 0 %
LYMPHOCYTES # BLD: 29 % (ref 24–43)
MCH RBC QN AUTO: 27.4 PG (ref 25.2–33.5)
MCHC RBC AUTO-ENTMCNC: 33.1 G/DL (ref 28.4–34.8)
MCV RBC AUTO: 82.8 FL (ref 82.6–102.9)
MONOCYTES # BLD: 12 % (ref 3–12)
MYOGLOBIN: 294 NG/ML (ref 25–58)
MYOGLOBIN: 369 NG/ML (ref 25–58)
NRBC AUTOMATED: 0 PER 100 WBC
PDW BLD-RTO: 15.9 % (ref 11.8–14.4)
PLATELET # BLD: 222 K/UL (ref 138–453)
PLATELET ESTIMATE: ABNORMAL
PMV BLD AUTO: 11.6 FL (ref 8.1–13.5)
POTASSIUM SERPL-SCNC: 3.8 MMOL/L (ref 3.7–5.3)
RBC # BLD: 4.6 M/UL (ref 3.95–5.11)
RBC # BLD: ABNORMAL 10*6/UL
SEG NEUTROPHILS: 54 % (ref 36–65)
SEGMENTED NEUTROPHILS ABSOLUTE COUNT: 2.9 K/UL (ref 1.5–8.1)
SODIUM BLD-SCNC: 140 MMOL/L (ref 135–144)
TOTAL PROTEIN: 7.2 G/DL (ref 6.4–8.3)
TROPONIN INTERP: ABNORMAL
TROPONIN INTERP: ABNORMAL
TROPONIN T: ABNORMAL NG/ML
TROPONIN T: ABNORMAL NG/ML
TROPONIN, HIGH SENSITIVITY: 25 NG/L (ref 0–14)
TROPONIN, HIGH SENSITIVITY: 27 NG/L (ref 0–14)
WBC # BLD: 5.4 K/UL (ref 3.5–11.3)
WBC # BLD: ABNORMAL 10*3/UL

## 2021-07-07 PROCEDURE — 70450 CT HEAD/BRAIN W/O DYE: CPT

## 2021-07-07 PROCEDURE — 6370000000 HC RX 637 (ALT 250 FOR IP): Performed by: PHYSICIAN ASSISTANT

## 2021-07-07 PROCEDURE — 80053 COMPREHEN METABOLIC PANEL: CPT

## 2021-07-07 PROCEDURE — 96374 THER/PROPH/DIAG INJ IV PUSH: CPT

## 2021-07-07 PROCEDURE — 83874 ASSAY OF MYOGLOBIN: CPT

## 2021-07-07 PROCEDURE — 6360000002 HC RX W HCPCS: Performed by: PHYSICIAN ASSISTANT

## 2021-07-07 PROCEDURE — 99284 EMERGENCY DEPT VISIT MOD MDM: CPT

## 2021-07-07 PROCEDURE — 2580000003 HC RX 258: Performed by: PHYSICIAN ASSISTANT

## 2021-07-07 PROCEDURE — 93005 ELECTROCARDIOGRAM TRACING: CPT | Performed by: PHYSICIAN ASSISTANT

## 2021-07-07 PROCEDURE — 84484 ASSAY OF TROPONIN QUANT: CPT

## 2021-07-07 PROCEDURE — 85025 COMPLETE CBC W/AUTO DIFF WBC: CPT

## 2021-07-07 RX ORDER — SODIUM CHLORIDE 9 MG/ML
INJECTION, SOLUTION INTRAVENOUS CONTINUOUS
Status: DISCONTINUED | OUTPATIENT
Start: 2021-07-07 | End: 2021-07-07 | Stop reason: HOSPADM

## 2021-07-07 RX ORDER — ONDANSETRON 2 MG/ML
4 INJECTION INTRAMUSCULAR; INTRAVENOUS ONCE
Status: COMPLETED | OUTPATIENT
Start: 2021-07-07 | End: 2021-07-07

## 2021-07-07 RX ORDER — ONDANSETRON 4 MG/1
4 TABLET, ORALLY DISINTEGRATING ORAL EVERY 8 HOURS PRN
Qty: 20 TABLET | Refills: 0 | Status: SHIPPED | OUTPATIENT
Start: 2021-07-07

## 2021-07-07 RX ORDER — MECLIZINE HCL 12.5 MG/1
25 TABLET ORAL ONCE
Status: COMPLETED | OUTPATIENT
Start: 2021-07-07 | End: 2021-07-07

## 2021-07-07 RX ORDER — MECLIZINE HYDROCHLORIDE 25 MG/1
25 TABLET ORAL 3 TIMES DAILY PRN
Qty: 20 TABLET | Refills: 0 | Status: SHIPPED | OUTPATIENT
Start: 2021-07-07 | End: 2021-07-17

## 2021-07-07 RX ADMIN — MECLIZINE HCL 12.5 MG 25 MG: 12.5 TABLET ORAL at 09:53

## 2021-07-07 RX ADMIN — SODIUM CHLORIDE: 9 INJECTION, SOLUTION INTRAVENOUS at 09:54

## 2021-07-07 RX ADMIN — ONDANSETRON 4 MG: 2 INJECTION INTRAMUSCULAR; INTRAVENOUS at 09:53

## 2021-07-07 ASSESSMENT — PAIN DESCRIPTION - FREQUENCY: FREQUENCY: CONTINUOUS

## 2021-07-07 ASSESSMENT — PAIN SCALES - GENERAL: PAINLEVEL_OUTOF10: 8

## 2021-07-07 ASSESSMENT — PAIN DESCRIPTION - DESCRIPTORS: DESCRIPTORS: ACHING

## 2021-07-07 ASSESSMENT — PAIN DESCRIPTION - LOCATION: LOCATION: HEAD

## 2021-07-07 NOTE — ED PROVIDER NOTES
[address] [place] [address] ED  [facility]      Pt Name: Sierra Gill  MRN: [id number]  [place] 1955  Date ofevaluation: 7/7/2021  Provider: Karol Spicer Dr       Chief Complaint   Patient presents with    Dizziness     onset 5:30 this am    Nausea    Headache         HISTORY OF PRESENT ILLNESS  (Location/Symptom, Timing/Onset, Context/Setting, Quality, Duration, Modifying Factors, Severity.)   Sierra Gill is a 63 y.o. female who presents to the emergency department with dizziness that started around 5:00 this morning. Denies any chest pain shortness of breath. Reports history of vertigo and states this feels the same. Reports a sense of movement along with nausea. Sense of movement described as being spinning and worsened with change of position. Denies any ear pain or ear ringing. Nursing Notes were reviewed.     ALLERGIES     Amoxicillin and Pcn [penicillins]    CURRENT MEDICATIONS       Discharge Medication List as of 7/7/2021 12:21 PM      CONTINUE these medications which have NOT CHANGED    Details   glipiZIDE (GLUCOTROL) 5 MG tablet TAKE ONE-HALF TABLET BY MOUTH TWO TIMES A DAY WITH BREAKFAST AND SUPPER., Disp-30 tablet,R-3Normal      JANUVIA 50 MG tablet TAKE 1 TABLET BY MOUTH ONE TIME A DAY, Disp-30 tablet,R-3Normal      hydrALAZINE (APRESOLINE) 50 MG tablet TAKE 1 TABLET BY MOUTH 4 TIMES A DAY, Disp-120 tablet, R-2Normal      hydrochlorothiazide (HYDRODIURIL) 50 MG tablet Take 1 tablet by mouth daily, Disp-30 tablet, R-2Normal      losartan (COZAAR) 100 MG tablet TAKE 1 TABLET BY MOUTH ONE TIME A DAY, Disp-28 tablet, R-3Normal      omeprazole (PRILOSEC) 20 MG delayed release capsule TAKE ONE CAPSULE BY MOUTH TWICE A DAY, Disp-60 capsule, R-3Normal      atorvastatin (LIPITOR) 40 MG tablet Take 1 tablet by mouth nightly, Disp-30 tablet, R-3Normal      aspirin (ASPIRIN LOW DOSE) 81 MG chewable tablet Take 1 tablet by mouth daily, Disp-30 tablet, R-3Normal      loratadine (CLARITIN) 10 MG tablet TAKE 1 TABLET BY MOUTH ONE TIME A DAY, Disp-30 tablet, R-3Normal      metoprolol tartrate (LOPRESSOR) 50 MG tablet TAKE 1 & 1/2 TABLETS BY MOUTH TWICE DAILY, Disp-90 tablet, R-4Normal      vitamin D (ERGOCALCIFEROL) 13751 units CAPS capsule Take 1 capsule by mouth once a week, Disp-4 capsule, R-5Normal      albuterol sulfate  (90 Base) MCG/ACT inhaler Inhale 2 puffs into the lungs every 6 hours as needed for Wheezing, Disp-1 Inhaler, R-3Normal      NONFORMULARY Sea [id number] as directedHistorical Med      amLODIPine (NORVASC) 5 MG tablet Take 1 tablet by mouth daily, Disp-30 tablet, R-3Normal      citalopram (CELEXA) 10 MG tablet Take 1 tablet by mouth daily, Disp-30 tablet, R-3Normal      miconazole (ZEASORB-AF) 2 % powder Apply topically 2 times daily. , Disp-45 g, R-1, Normal      Biotin (BIOTIN 5000) 5 MG CAPS Take by mouthHistorical Med      beclomethasone (QVAR) 80 MCG/ACT inhaler Inhale 1 puff into the lungs 2 times daily, Disp-1 Inhaler, R-3Normal      blood glucose test strips (TRUE METRIX BLOOD GLUCOSE TEST) strip 2 TIMES DAILY Starting Fri 12/21/2018, Disp-100 each, R-5, NormalAs needed.        Alcohol Swabs (ALCOHOL WIPES) 70 % PADS Disp-100 each, R-5, NormalUse up to 4 times a day when testing blood sugars      Lancets MISC DAILY Starting Fri 12/21/2018, Disp-100 each, R-11, NormalUse 4 times daily Diagnisis:250.0  Diabetes Mellitus      Multiple Vitamins-Minerals (THERAPEUTIC MULTIVITAMIN-MINERALS) tablet Take 1 tablet by mouth daily, Disp-30 tablet, R-11Normal             PAST MEDICAL HISTORY         Diagnosis Date    Anxiety     Asthma     CAD (coronary artery disease)     Depression     GERD (gastroesophageal reflux disease)     Hyperlipidemia     Hypertension     MI (myocardial infarction) ([facility] [address]     Obesity     Osteoarthritis     left knee    Sinus tarsi syndrome     Thyroid cyst     Type II or unspecified type diabetes mellitus without mention of complication, not stated as uncontrolled     Urinary incontinence     Vertigo        SURGICAL HISTORY           Procedure Laterality Date    BREAST CYST EXCISION      HYSTERECTOMY           FAMILY HISTORY           Problem Relation Age of Onset    High Blood Pressure Mother     Seizures Mother     Heart Disease Father      Family Status   Relation Name Status    Mother  Alive    Father          SOCIAL HISTORY      reports that she has never smoked. She has never used smokeless tobacco. She reports that she does not drink alcohol and does not use drugs. REVIEW OFSYSTEMS    (2-9 systems for level 4, 10 or more for level 5)   Review of Systems    Except as noted above the remainder of the review of systems was reviewed and negative. PHYSICAL EXAM    (up to 7 for level 4, 8 or more for level 5)     ED Triage Vitals [21 0906]   BP Temp Temp Source Pulse Resp SpO2 Height Weight   (!) 144/83 98.5 °F (36.9 °C) Oral 76 16 99 % 5' 4\" (1.626 m) 260 lb (117.9 kg)      Physical Exam  Constitutional:       Appearance: She is well-developed. HENT:      Head: Normocephalic and atraumatic. Cardiovascular:      Rate and Rhythm: Normal rate and regular rhythm. Pulmonary:      Effort: Pulmonary effort is normal. No respiratory distress. Breath sounds: Normal breath sounds. No wheezing. Abdominal:      Palpations: Abdomen is soft. Musculoskeletal:         General: Normal range of motion. Cervical back: Normal range of motion and neck supple. Skin:     General: Skin is warm. Findings: No rash. Neurological:      Mental Status: She is alert and oriented to person, place, and time.    Psychiatric:         Behavior: Behavior normal.                 DIAGNOSTIC RESULTS     EKG: All EKG's are interpreted by the Emergency Department Physician who either signs or Co-signs this chart in the absence of a cardiologist.        RADIOLOGY:   Non-plain film images such as CT, Ultrasound and MRI are read by the radiologist. Plain radiographic images arevisualized and preliminarily interpreted by the emergency physician with the below findings:        Interpretation per the Radiologist below, if available at thetime of this note:          ED BEDSIDE ULTRASOUND:   Performed by ED Physician - none    LABS:  Labs Reviewed   CBC WITH AUTO DIFFERENTIAL - Abnormal; Notable for the following components:       Result Value    RDW 15.9 (*)     All other components within normal limits   COMPREHENSIVE METABOLIC PANEL - Abnormal; Notable for the following components:    Glucose 161 (*)     Bun/Cre Ratio 25 (*)     Total Bilirubin 0.25 (*)     All other components within normal limits   TROP/MYOGLOBIN - Abnormal; Notable for the following components:    Troponin, High Sensitivity 27 (*)     Myoglobin 369 (*)     All other components within normal limits   TROP/MYOGLOBIN - Abnormal; Notable for the following components:    Troponin, High Sensitivity 25 (*)     Myoglobin 294 (*)     All other components within normal limits       All other labs were within normal range or not returned as of this dictation. EMERGENCY DEPARTMENT COURSE and DIFFERENTIAL DIAGNOSIS/MDM:   Vitals:    Vitals:    07/07/21 1015 07/07/21 1030 07/07/21 1045 07/07/21 1058   BP:       Pulse: 72 68 69 72   Resp: 16 16 15 17   Temp:       TempSrc:       SpO2: 96% 97% 100% 97%   Weight:       Height:         Will discharge home. Consistent vertigo. Patient has missed with normal gait in ER. Improved after Antivert and fluids. Will discharge home Antivert and Zofran    CONSULTS:  None    PROCEDURES:  Procedures        FINAL IMPRESSION      1. Benign paroxysmal positional vertigo, unspecified laterality    2.  Dizziness          DISPOSITION/PLAN   DISPOSITION Decision To Discharge 07/07/2021 12:20:57 PM      PATIENTREFERRED TO:   Reid Kirk MD  [address]  [phone]    In 3 days        DISCHARGE MEDICATIONS:     Discharge Medication List as of 7/7/2021 12:21 PM      START taking these medications    Details   ondansetron (ZOFRAN ODT) 4 MG disintegrating tablet Take 1 tablet by mouth every 8 hours as needed for Nausea, Disp-20 tablet, R-0Normal      meclizine (ANTIVERT) 25 MG tablet Take 1 tablet by mouth 3 times daily as needed for Dizziness or Nausea, Disp-20 tablet, R-0Normal                 (Please note that portions of this note were completed with a voice recognition program.  Efforts were made to edit thedictations but occasionally words are mis-transcribed.)    DARREN Santillan PA-C  07/07/21 6409

## 2021-07-07 NOTE — ED PROVIDER NOTES
EMERGENCY DEPARTMENT ENCOUNTER   ATTENDING ATTESTATION     Pt Name: Cecily Schlatter  MRN: [id number]  [place] 1955  Date of evaluation: 7/7/21   Cecily Schlatter is a 63 y.o. female with CC: Dizziness (onset 5:30 this am), Nausea, and Headache    MDM:   Patient is a 81-year-old female with history of diabetes hypertension CAD vertigo here with dizziness, headache, nausea. She states she woke up this morning and was feeling the symptoms. She states she has had vertigo before and this feels similar. She does not have any vertigo Antivert at home. She is hard of hearing did not bring her hearing aids. She states that headache is mild to moderate was not sudden onset and she has had similar ones before. She states it is frontal.  Denies any blurry or double vision or trouble speech. Denies any neck stiffness any fall and anticoagulation use. Denies any chest pain shortness of breath. She states she had a \"upset stomach\" and some nausea but she is feeling better after being given the Antivert and Zofran. She states the dizziness was when she stood up and moved her head. Denies any fevers or chills. On exam no distress resting comfortably afebrile she is hypertensive she is 99% on room air her neck is supple no meningismus pupils equal and reactive no nystagmus smile symmetric as well as face, cranial nerves II through XII grossly intact strength is equal 5 out of 5 all 4 extremities as well as sensation no pronator drift normal finger-nose-finger testing. Heart sounds regular lungs clear abdomen soft nontender no pulsatile mass. Plan is for symptomatic treatment, blood work, EKG, cardiac work-up, CT head given her age and reassessment.          CRITICAL CARE:       EKG: All EKG's are interpreted by the Emergency Department Physician who either signs or Co-signs this chart in the absence of a cardiologist.    Normal sinus rhythm rate of 72 normal intervals left axis LVH no ST elevations or depressions there is T wave inversion lead III, inferior infarct age undetermined, abnormal EKG; when compared to prior on 1/23/2021 no significant changes    RADIOLOGY:All plain film, CT, MRI, and formal ultrasound images (except ED bedside ultrasound) are read by the radiologist, see reports below, unless otherwise noted in MDM or here. CT HEAD WO CONTRAST    (Results Pending)     LABS: All lab results were reviewed by myself, and all abnormals are listed below. Labs Reviewed   CBC WITH AUTO DIFFERENTIAL   COMPREHENSIVE METABOLIC PANEL   TROP/MYOGLOBIN   TROP/MYOGLOBIN   TROP/MYOGLOBIN     CONSULTS:  None  FINAL IMPRESSION    No diagnosis found.         PASTMEDICAL HISTORY     Past Medical History:   Diagnosis Date    Anxiety     Asthma     CAD (coronary artery disease)     Depression     GERD (gastroesophageal reflux disease)     Hyperlipidemia     Hypertension     [facility] infarction) ([facility] [address]     Obesity     Osteoarthritis     left knee    Sinus tarsi syndrome     Thyroid cyst     Type II or unspecified type diabetes mellitus without mention of complication, not stated as uncontrolled     Urinary incontinence     Vertigo      SURGICAL HISTORY       Past Surgical History:   Procedure Laterality Date    BREAST CYST EXCISION      HYSTERECTOMY       CURRENT MEDICATIONS       Previous Medications    ALBUTEROL SULFATE  (90 BASE) MCG/ACT INHALER    Inhale 2 puffs into the lungs every 6 hours as needed for Wheezing    ALCOHOL SWABS (ALCOHOL WIPES) 70 % PADS    Use up to 4 times a day when testing blood sugars    AMLODIPINE (NORVASC) 5 MG TABLET    Take 1 tablet by mouth daily    ASPIRIN (ASPIRIN LOW DOSE) 81 MG CHEWABLE TABLET    Take 1 tablet by mouth daily    ATORVASTATIN (LIPITOR) 40 MG TABLET    Take 1 tablet by mouth nightly    BECLOMETHASONE (QVAR) 80 MCG/ACT INHALER    Inhale 1 puff into the lungs 2 times daily    BIOTIN (BIOTIN 5000) 5 MG CAPS    Take by mouth    BLOOD GLUCOSE TEST STRIPS (TRUE METRIX BLOOD GLUCOSE TEST) STRIP    1 each by In Vitro route 2 times daily As needed. CITALOPRAM (CELEXA) 10 MG TABLET    Take 1 tablet by mouth daily    GLIPIZIDE (GLUCOTROL) 5 MG TABLET    TAKE ONE-HALF TABLET BY MOUTH TWO TIMES A DAY WITH BREAKFAST AND SUPPER. HYDRALAZINE (APRESOLINE) 50 MG TABLET    TAKE 1 TABLET BY MOUTH 4 TIMES A DAY    HYDROCHLOROTHIAZIDE (HYDRODIURIL) 50 MG TABLET    Take 1 tablet by mouth daily    JANUVIA 50 MG TABLET    TAKE 1 TABLET BY MOUTH ONE TIME A DAY    LANCETS MISC    1 each by Does not apply route daily Use 4 times daily Diagnisis:250.0  Diabetes Mellitus    LORATADINE (CLARITIN) 10 MG TABLET    TAKE 1 TABLET BY MOUTH ONE TIME A DAY    LOSARTAN (COZAAR) 100 MG TABLET    TAKE 1 TABLET BY MOUTH ONE TIME A DAY    METOPROLOL TARTRATE (LOPRESSOR) 50 MG TABLET    TAKE 1 & 1/2 TABLETS BY MOUTH TWICE DAILY    MICONAZOLE (ZEASORB-AF) 2 % POWDER    Apply topically 2 times daily. MULTIPLE VITAMINS-MINERALS (THERAPEUTIC MULTIVITAMIN-MINERALS) TABLET    Take 1 tablet by mouth daily    NONFORMULARY    Sea [id number] as directed    OMEPRAZOLE (PRILOSEC) 20 MG DELAYED RELEASE CAPSULE    TAKE ONE CAPSULE BY MOUTH TWICE A DAY    VITAMIN D (ERGOCALCIFEROL) 32189 UNITS CAPS CAPSULE    Take 1 capsule by mouth once a week     ALLERGIES     is allergic to amoxicillin and pcn [penicillins]. FAMILY HISTORY     She indicated that her mother is alive. She indicated that her father is . SOCIAL HISTORY       Social History     Tobacco Use    Smoking status: Never Smoker    Smokeless tobacco: Never Used   Substance Use Topics    Alcohol use: No     Alcohol/week: 0.0 standard drinks    Drug use: No       I personally evaluated and examined the patient in conjunction with the APC and agree with the assessment, treatment plan, and disposition of the patient as recorded by the APC.    Cassie Thurman MD  Attending Emergency Physician         Cassie Thurman MD  21 [id number]

## 2021-08-23 ENCOUNTER — HOSPITAL ENCOUNTER (OUTPATIENT)
Dept: MAMMOGRAPHY | Age: 66
Discharge: HOME | End: 2021-08-25
Payer: MEDICARE

## 2021-08-23 DIAGNOSIS — Z12.39 SCREENING BREAST EXAMINATION: ICD-10-CM

## 2021-08-23 PROCEDURE — 77063 BREAST TOMOSYNTHESIS BI: CPT

## 2022-01-27 ENCOUNTER — HOSPITAL ENCOUNTER (OUTPATIENT)
Age: 67
Discharge: HOME | End: 2022-01-27
Payer: MEDICARE

## 2022-01-27 LAB
ALT SERPL-CCNC: 15 U/L (ref 5–33)
AST SERPL-CCNC: 16 U/L
CHOLESTEROL, FASTING: 141 MG/DL
CHOLESTEROL/HDL RATIO: 2.9
HDLC SERPL-MCNC: 49 MG/DL
LDL CHOLESTEROL: 77 MG/DL (ref 0–130)
TRIGLYCERIDE, FASTING: 73 MG/DL
VLDLC SERPL CALC-MCNC: NORMAL MG/DL (ref 1–30)

## 2022-01-27 PROCEDURE — 80061 LIPID PANEL: CPT

## 2022-01-27 PROCEDURE — 84460 ALANINE AMINO (ALT) (SGPT): CPT

## 2022-01-27 PROCEDURE — 84450 TRANSFERASE (AST) (SGOT): CPT

## 2022-01-27 PROCEDURE — 36415 COLL VENOUS BLD VENIPUNCTURE: CPT

## 2022-01-28 ENCOUNTER — HOSPITAL ENCOUNTER (OUTPATIENT)
Dept: NUCLEAR MEDICINE | Age: 67
Discharge: HOME | End: 2022-01-30
Payer: MEDICARE

## 2022-01-28 ENCOUNTER — HOSPITAL ENCOUNTER (OUTPATIENT)
Dept: NON INVASIVE DIAGNOSTICS | Age: 67
Discharge: HOME | End: 2022-01-28
Payer: MEDICARE

## 2022-01-28 DIAGNOSIS — R06.00 DYSPNEA, UNSPECIFIED TYPE: ICD-10-CM

## 2022-01-28 DIAGNOSIS — I10 HYPERTENSION, UNSPECIFIED TYPE: ICD-10-CM

## 2022-01-28 DIAGNOSIS — R42 DIZZY: ICD-10-CM

## 2022-01-28 LAB
LV EF: 61 %
LV EF: 69 %
LVEF MODALITY: NORMAL
LVEF MODALITY: NORMAL

## 2022-01-28 PROCEDURE — 2580000003 HC RX 258: Performed by: INTERNAL MEDICINE

## 2022-01-28 PROCEDURE — 93306 TTE W/DOPPLER COMPLETE: CPT

## 2022-01-28 PROCEDURE — 2580000003 HC RX 258: Performed by: FAMILY MEDICINE

## 2022-01-28 PROCEDURE — 6360000002 HC RX W HCPCS: Performed by: INTERNAL MEDICINE

## 2022-01-28 PROCEDURE — A9500 TC99M SESTAMIBI: HCPCS | Performed by: FAMILY MEDICINE

## 2022-01-28 PROCEDURE — 93017 CV STRESS TEST TRACING ONLY: CPT

## 2022-01-28 PROCEDURE — 93356 MYOCRD STRAIN IMG SPCKL TRCK: CPT

## 2022-01-28 PROCEDURE — 3430000000 HC RX DIAGNOSTIC RADIOPHARMACEUTICAL: Performed by: FAMILY MEDICINE

## 2022-01-28 PROCEDURE — 78452 HT MUSCLE IMAGE SPECT MULT: CPT

## 2022-01-28 RX ORDER — SODIUM CHLORIDE 0.9 % (FLUSH) 0.9 %
10 SYRINGE (ML) INJECTION PRN
Status: DISCONTINUED | OUTPATIENT
Start: 2022-01-28 | End: 2022-01-31 | Stop reason: HOSPADM

## 2022-01-28 RX ORDER — ALBUTEROL SULFATE 90 UG/1
2 AEROSOL, METERED RESPIRATORY (INHALATION) PRN
Status: DISCONTINUED | OUTPATIENT
Start: 2022-01-28 | End: 2022-01-28 | Stop reason: ALTCHOICE

## 2022-01-28 RX ORDER — AMINOPHYLLINE DIHYDRATE 25 MG/ML
50 INJECTION, SOLUTION INTRAVENOUS PRN
Status: DISCONTINUED | OUTPATIENT
Start: 2022-01-28 | End: 2022-01-28 | Stop reason: ALTCHOICE

## 2022-01-28 RX ORDER — SODIUM CHLORIDE 9 MG/ML
500 INJECTION, SOLUTION INTRAVENOUS CONTINUOUS PRN
Status: DISCONTINUED | OUTPATIENT
Start: 2022-01-28 | End: 2022-01-28 | Stop reason: ALTCHOICE

## 2022-01-28 RX ORDER — ATROPINE SULFATE 0.1 MG/ML
0.5 INJECTION INTRAVENOUS EVERY 5 MIN PRN
Status: DISCONTINUED | OUTPATIENT
Start: 2022-01-28 | End: 2022-01-28 | Stop reason: ALTCHOICE

## 2022-01-28 RX ORDER — SODIUM CHLORIDE 0.9 % (FLUSH) 0.9 %
5-40 SYRINGE (ML) INJECTION PRN
Status: DISCONTINUED | OUTPATIENT
Start: 2022-01-28 | End: 2022-01-28 | Stop reason: ALTCHOICE

## 2022-01-28 RX ORDER — METOPROLOL TARTRATE 5 MG/5ML
5 INJECTION INTRAVENOUS EVERY 5 MIN PRN
Status: DISCONTINUED | OUTPATIENT
Start: 2022-01-28 | End: 2022-01-28 | Stop reason: ALTCHOICE

## 2022-01-28 RX ORDER — NITROGLYCERIN 0.4 MG/1
0.4 TABLET SUBLINGUAL EVERY 5 MIN PRN
Status: DISCONTINUED | OUTPATIENT
Start: 2022-01-28 | End: 2022-01-28 | Stop reason: ALTCHOICE

## 2022-01-28 RX ADMIN — SODIUM CHLORIDE, PRESERVATIVE FREE 10 ML: 5 INJECTION INTRAVENOUS at 10:48

## 2022-01-28 RX ADMIN — REGADENOSON 0.4 MG: 0.08 INJECTION, SOLUTION INTRAVENOUS at 10:48

## 2022-01-28 RX ADMIN — TETRAKIS(2-METHOXYISOBUTYLISOCYANIDE)COPPER(I) TETRAFLUOROBORATE 17 MILLICURIE: 1 INJECTION, POWDER, LYOPHILIZED, FOR SOLUTION INTRAVENOUS at 09:06

## 2022-01-28 RX ADMIN — SODIUM CHLORIDE, PRESERVATIVE FREE 10 ML: 5 INJECTION INTRAVENOUS at 10:43

## 2022-01-28 RX ADMIN — TETRAKIS(2-METHOXYISOBUTYLISOCYANIDE)COPPER(I) TETRAFLUOROBORATE 43 MILLICURIE: 1 INJECTION, POWDER, LYOPHILIZED, FOR SOLUTION INTRAVENOUS at 10:48

## 2022-01-28 RX ADMIN — SODIUM CHLORIDE, PRESERVATIVE FREE 10 ML: 5 INJECTION INTRAVENOUS at 09:06

## 2022-01-28 NOTE — PROCEDURES
[address] [facility]                  [address] [facility], [place] [address]                              CARDIAC STRESS TEST    PATIENT NAME: Abelino Chen                     :        1955  MED REC NO:   [id number]                             ROOM:  ACCOUNT NO:   [de-identified]                           ADMIT DATE: 2022  PROVIDER:     Lalita Marina    DATE OF STUDY:  2022    ORDERING PROVIDER:  Miguel Carlisle MD  PRIMARY CARE PROVIDER:  Sandy Samaniego MD  INTERPRETING PHYSICIAN:  Lalita Marina MD    LEXISCAN STRESS STUDY:  Indication:  Other, dyspnea    Procedure was explained and consent form was signed    Medications:  Lexiscan, 0.4 mg  Resting heart rate:  64 bpm  Resting blood pressure:  147/81 mm/Hg  Infusion heart rate:  94 bpm  Infusion blood pressure:  147/81 mm/Hg  Resting EKG:  Normal sinus rhythm with anteroseptal infarct and  nonspecific T wave abnormality  Stress heart response:  Normal  Stress BP response:  Appropriate  Stress EKG(S):  No changes seen  Chest discomfort:  None  Ischemic EKG changes:  None    IMPRESSION:  Electrocardiographically negative Lexiscan stress study. Radio-isotope  results to follow from the department of Nuclear Medicine.           Fara Rehman [facility]    D: 2022 14:33:17       T: 2022 14:34:32     /JT  Job#: [id number]     Doc#: Unknown    CC:    ()

## 2022-10-19 ENCOUNTER — HOSPITAL ENCOUNTER (OUTPATIENT)
Dept: MAMMOGRAPHY | Age: 67
Discharge: HOME | End: 2022-10-21
Payer: MEDICARE

## 2022-10-19 DIAGNOSIS — Z78.0 POSTMENOPAUSAL: ICD-10-CM

## 2022-10-19 DIAGNOSIS — Z12.31 BREAST CANCER SCREENING BY MAMMOGRAM: ICD-10-CM

## 2022-10-19 PROCEDURE — 77063 BREAST TOMOSYNTHESIS BI: CPT

## 2022-10-19 PROCEDURE — 77080 DXA BONE DENSITY AXIAL: CPT

## 2023-01-02 ENCOUNTER — HOSPITAL ENCOUNTER (OUTPATIENT)
Age: 68
Discharge: HOME | End: 2023-01-02
Payer: MEDICARE

## 2023-01-02 LAB
ABSOLUTE EOS #: 0.13 K/UL (ref 0–0.44)
ABSOLUTE IMMATURE GRANULOCYTE: <0.03 K/UL (ref 0–0.3)
ABSOLUTE LYMPH #: 1.37 K/UL (ref 1.1–3.7)
ABSOLUTE MONO #: 0.67 K/UL (ref 0.1–1.2)
ALBUMIN SERPL-MCNC: 4 G/DL (ref 3.5–5.2)
ALBUMIN/GLOBULIN RATIO: 1.1 (ref 1–2.5)
ALP BLD-CCNC: 50 U/L (ref 35–104)
ALT SERPL-CCNC: 12 U/L (ref 5–33)
ANION GAP SERPL CALCULATED.3IONS-SCNC: 10 MMOL/L (ref 9–17)
AST SERPL-CCNC: 14 U/L
BASOPHILS # BLD: 1 % (ref 0–2)
BASOPHILS ABSOLUTE: 0.06 K/UL (ref 0–0.2)
BILIRUB SERPL-MCNC: 0.5 MG/DL (ref 0.3–1.2)
BUN BLDV-MCNC: 20 MG/DL (ref 8–23)
CALCIUM SERPL-MCNC: 9.1 MG/DL (ref 8.6–10.4)
CHLORIDE BLD-SCNC: 104 MMOL/L (ref 98–107)
CHOLESTEROL/HDL RATIO: 2.9
CHOLESTEROL: 146 MG/DL
CO2: 24 MMOL/L (ref 20–31)
CREAT SERPL-MCNC: 0.68 MG/DL (ref 0.5–0.9)
EOSINOPHILS RELATIVE PERCENT: 3 % (ref 1–4)
ESTIMATED AVERAGE GLUCOSE: 140 MG/DL
GFR SERPL CREATININE-BSD FRML MDRD: >60 ML/MIN/1.73M2
GLUCOSE BLD-MCNC: 152 MG/DL (ref 70–99)
HBA1C MFR BLD: 6.5 % (ref 4–6)
HCT VFR BLD CALC: 37.8 % (ref 36.3–47.1)
HDLC SERPL-MCNC: 51 MG/DL
HEMOGLOBIN: 12.5 G/DL (ref 11.9–15.1)
IMMATURE GRANULOCYTES: 0 %
LDL CHOLESTEROL: 81 MG/DL (ref 0–130)
LYMPHOCYTES # BLD: 27 % (ref 24–43)
MCH RBC QN AUTO: 27.7 PG (ref 25.2–33.5)
MCHC RBC AUTO-ENTMCNC: 33.1 G/DL (ref 28.4–34.8)
MCV RBC AUTO: 83.8 FL (ref 82.6–102.9)
MONOCYTES # BLD: 13 % (ref 3–12)
NRBC AUTOMATED: 0 PER 100 WBC
PDW BLD-RTO: 15.8 % (ref 11.8–14.4)
PLATELET # BLD: 238 K/UL (ref 138–453)
PMV BLD AUTO: 12.1 FL (ref 8.1–13.5)
POTASSIUM SERPL-SCNC: 3.8 MMOL/L (ref 3.7–5.3)
RBC # BLD: 4.51 M/UL (ref 3.95–5.11)
RBC # BLD: ABNORMAL 10*6/UL
SEG NEUTROPHILS: 56 % (ref 36–65)
SEGMENTED NEUTROPHILS ABSOLUTE COUNT: 2.77 K/UL (ref 1.5–8.1)
SODIUM BLD-SCNC: 138 MMOL/L (ref 135–144)
THYROXINE, FREE: 1.28 NG/DL (ref 0.93–1.7)
TOTAL PROTEIN: 7.8 G/DL (ref 6.4–8.3)
TRIGL SERPL-MCNC: 69 MG/DL
TSH SERPL DL<=0.05 MIU/L-ACNC: 1.2 UIU/ML (ref 0.3–5)
VITAMIN B-12: 570 PG/ML (ref 232–1245)
VITAMIN D 25-HYDROXY: 24.8 NG/ML
WBC # BLD: 5 K/UL (ref 3.5–11.3)

## 2023-01-02 PROCEDURE — 84443 ASSAY THYROID STIM HORMONE: CPT

## 2023-01-02 PROCEDURE — 82306 VITAMIN D 25 HYDROXY: CPT

## 2023-01-02 PROCEDURE — 83036 HEMOGLOBIN GLYCOSYLATED A1C: CPT

## 2023-01-02 PROCEDURE — 82607 VITAMIN B-12: CPT

## 2023-01-02 PROCEDURE — 80053 COMPREHEN METABOLIC PANEL: CPT

## 2023-01-02 PROCEDURE — 85027 COMPLETE CBC AUTOMATED: CPT

## 2023-01-02 PROCEDURE — 84439 ASSAY OF FREE THYROXINE: CPT

## 2023-01-02 PROCEDURE — 80061 LIPID PANEL: CPT

## 2023-07-11 ENCOUNTER — APPOINTMENT (OUTPATIENT)
Dept: GENERAL RADIOLOGY | Age: 68
End: 2023-07-11
Payer: COMMERCIAL

## 2023-07-11 ENCOUNTER — HOSPITAL ENCOUNTER (EMERGENCY)
Age: 68
Discharge: HOME | End: 2023-07-11
Attending: EMERGENCY MEDICINE
Payer: COMMERCIAL

## 2023-07-11 ENCOUNTER — APPOINTMENT (OUTPATIENT)
Dept: CT IMAGING | Age: 68
End: 2023-07-11
Payer: COMMERCIAL

## 2023-07-11 VITALS
HEIGHT: 64 IN | SYSTOLIC BLOOD PRESSURE: 106 MMHG | RESPIRATION RATE: 18 BRPM | TEMPERATURE: 98.8 F | DIASTOLIC BLOOD PRESSURE: 53 MMHG | OXYGEN SATURATION: 100 % | HEART RATE: 45 BPM | WEIGHT: 250 LBS | BODY MASS INDEX: 42.68 KG/M2

## 2023-07-11 DIAGNOSIS — G89.29 CHRONIC PAIN OF LEFT KNEE: ICD-10-CM

## 2023-07-11 DIAGNOSIS — M54.50 ACUTE MIDLINE LOW BACK PAIN WITHOUT SCIATICA: Primary | ICD-10-CM

## 2023-07-11 DIAGNOSIS — M25.562 CHRONIC PAIN OF LEFT KNEE: ICD-10-CM

## 2023-07-11 DIAGNOSIS — E27.8 ADRENAL NODULE (HCC): ICD-10-CM

## 2023-07-11 PROCEDURE — 96372 THER/PROPH/DIAG INJ SC/IM: CPT

## 2023-07-11 PROCEDURE — 6360000002 HC RX W HCPCS: Performed by: EMERGENCY MEDICINE

## 2023-07-11 PROCEDURE — 99284 EMERGENCY DEPT VISIT MOD MDM: CPT

## 2023-07-11 PROCEDURE — 73562 X-RAY EXAM OF KNEE 3: CPT

## 2023-07-11 PROCEDURE — 72131 CT LUMBAR SPINE W/O DYE: CPT

## 2023-07-11 RX ORDER — CYCLOBENZAPRINE HCL 10 MG
10 TABLET ORAL 3 TIMES DAILY PRN
Qty: 21 TABLET | Refills: 0 | Status: SHIPPED | OUTPATIENT
Start: 2023-07-11 | End: 2023-07-21

## 2023-07-11 RX ORDER — KETOROLAC TROMETHAMINE 30 MG/ML
30 INJECTION, SOLUTION INTRAMUSCULAR; INTRAVENOUS ONCE
Status: COMPLETED | OUTPATIENT
Start: 2023-07-11 | End: 2023-07-11

## 2023-07-11 RX ORDER — IBUPROFEN 600 MG/1
600 TABLET ORAL 3 TIMES DAILY PRN
Qty: 30 TABLET | Refills: 0 | Status: SHIPPED | OUTPATIENT
Start: 2023-07-11

## 2023-07-11 RX ADMIN — KETOROLAC TROMETHAMINE 30 MG: 30 INJECTION, SOLUTION INTRAMUSCULAR; INTRAVENOUS at 13:05

## 2023-07-11 ASSESSMENT — ENCOUNTER SYMPTOMS
BACK PAIN: 1
COLOR CHANGE: 0
PHOTOPHOBIA: 0
TROUBLE SWALLOWING: 0
VOICE CHANGE: 0
FACIAL SWELLING: 0
VOMITING: 0
NAUSEA: 0
CHEST TIGHTNESS: 0
ABDOMINAL PAIN: 0
SHORTNESS OF BREATH: 0
EYE PAIN: 0

## 2023-07-11 ASSESSMENT — PAIN DESCRIPTION - DESCRIPTORS: DESCRIPTORS: SHARP;ACHING

## 2023-07-11 ASSESSMENT — PAIN SCALES - GENERAL: PAINLEVEL_OUTOF10: 8

## 2023-07-11 ASSESSMENT — PAIN DESCRIPTION - LOCATION: LOCATION: KNEE

## 2023-07-11 ASSESSMENT — PAIN DESCRIPTION - ORIENTATION: ORIENTATION: LEFT

## 2023-07-11 NOTE — ED PROVIDER NOTES
EMERGENCY DEPARTMENT ENCOUNTER    Pt Name: Ynes Naranjo  MRN: [id number]  [address] [facility] [place] 1955  Date of evaluation: 7/11/23  CHIEF COMPLAINT       Chief Complaint   Patient presents with    Back Pain     Pt states she has lower back pain and Rt leg pain since this AM.     Leg Pain     HISTORY OF PRESENT ILLNESS   61-year-old female presenting to the ER complaining of left knee pain as well as lower back pain. The patient denies any fall or trauma but states the pain started yesterday in the back. The patient does have an underlying history of arthritis of both knees. The patient states the pain in the back is midline. The history is provided by the patient. Back Pain  Location:  Lumbar spine  Quality:  Aching  Associated symptoms: no abdominal pain, no chest pain, no dysuria and no headaches          REVIEW OF SYSTEMS     Review of Systems   Constitutional:  Negative for activity change, appetite change and fatigue. HENT:  Negative for facial swelling, trouble swallowing and voice change. Eyes:  Negative for photophobia and pain. Respiratory:  Negative for chest tightness and shortness of breath. Cardiovascular:  Negative for chest pain and palpitations. Gastrointestinal:  Negative for abdominal pain, nausea and vomiting. Genitourinary:  Negative for dysuria and urgency. Musculoskeletal:  Positive for arthralgias (L knee) and back pain. Skin:  Negative for color change and rash. Neurological:  Negative for dizziness, syncope and headaches. Psychiatric/Behavioral:  Negative for behavioral problems and hallucinations.     PASTMEDICAL HISTORY     Past Medical History:   Diagnosis Date    Anxiety     Asthma     CAD (coronary artery disease)     Depression     GERD (gastroesophageal reflux disease)     Hyperlipidemia     Hypertension     MI (myocardial infarction) ([address])     Obesity     Osteoarthritis     left knee    Sinus tarsi syndrome     Thyroid cyst     Type II or unspecified type

## 2023-11-11 ENCOUNTER — HOSPITAL ENCOUNTER (OUTPATIENT)
Dept: MAMMOGRAPHY | Age: 68
End: 2023-11-11
Payer: COMMERCIAL

## 2023-11-11 VITALS — BODY MASS INDEX: 42.68 KG/M2 | WEIGHT: 250 LBS | HEIGHT: 64 IN

## 2023-11-11 DIAGNOSIS — Z12.31 ENCOUNTER FOR SCREENING MAMMOGRAM FOR MALIGNANT NEOPLASM OF BREAST: ICD-10-CM

## 2023-11-11 PROCEDURE — 77063 BREAST TOMOSYNTHESIS BI: CPT

## 2024-02-12 ENCOUNTER — HOSPITAL ENCOUNTER (EMERGENCY)
Age: 69
Discharge: HOME | End: 2024-02-12
Attending: EMERGENCY MEDICINE
Payer: COMMERCIAL

## 2024-02-12 ENCOUNTER — APPOINTMENT (OUTPATIENT)
Dept: GENERAL RADIOLOGY | Age: 69
End: 2024-02-12
Payer: COMMERCIAL

## 2024-02-12 VITALS
OXYGEN SATURATION: 97 % | WEIGHT: 215 LBS | TEMPERATURE: 98.1 F | HEART RATE: 103 BPM | HEIGHT: 64 IN | BODY MASS INDEX: 36.7 KG/M2 | SYSTOLIC BLOOD PRESSURE: 142 MMHG | RESPIRATION RATE: 16 BRPM | DIASTOLIC BLOOD PRESSURE: 78 MMHG

## 2024-02-12 DIAGNOSIS — L03.032 CELLULITIS OF LEFT TOE: Primary | ICD-10-CM

## 2024-02-12 LAB
ANION GAP SERPL CALCULATED.3IONS-SCNC: 9 MMOL/L (ref 9–17)
BASOPHILS # BLD: 0.03 K/UL (ref 0–0.2)
BASOPHILS NFR BLD: 1 % (ref 0–2)
BUN SERPL-MCNC: 12 MG/DL (ref 8–23)
BUN/CREAT SERPL: ABNORMAL (ref 9–20)
CALCIUM SERPL-MCNC: 9.9 MG/DL (ref 8.6–10.4)
CHLORIDE SERPL-SCNC: 105 MMOL/L (ref 98–107)
CO2 SERPL-SCNC: 28 MMOL/L (ref 20–31)
CREAT SERPL-MCNC: <0.4 MG/DL (ref 0.5–0.9)
CRP SERPL HS-MCNC: <3 MG/L (ref 0–5)
EOSINOPHIL # BLD: 0.09 K/UL (ref 0–0.44)
EOSINOPHILS RELATIVE PERCENT: 2 % (ref 1–4)
ERYTHROCYTE [SEDIMENTATION RATE] IN BLOOD BY PHOTOMETRIC METHOD: 13 MM/HR (ref 0–30)
GFR SERPL CREATININE-BSD FRML MDRD: ABNORMAL ML/MIN/1.73M2
GLUCOSE SERPL-MCNC: 89 MG/DL (ref 70–99)
HGB BLD-MCNC: 10.9 G/DL (ref 11.9–15.1)
IMM GRANULOCYTES # BLD AUTO: 0.01 K/UL (ref 0–0.3)
IMM GRANULOCYTES NFR BLD: 0 %
LYMPHOCYTES NFR BLD: 1.63 K/UL (ref 1.1–3.7)
LYMPHOCYTES RELATIVE PERCENT: 35 % (ref 24–43)
MCH RBC QN AUTO: 27.3 PG (ref 25.2–33.5)
MCHC RBC AUTO-ENTMCNC: 32.7 G/DL (ref 28.4–34.8)
MCV RBC AUTO: 83.3 FL (ref 82.6–102.9)
MONOCYTES NFR BLD: 0.7 K/UL (ref 0.1–1.2)
MONOCYTES NFR BLD: 15 % (ref 3–12)
NEUTROPHILS NFR BLD: 47 % (ref 36–65)
NEUTS SEG NFR BLD: 2.27 K/UL (ref 1.5–8.1)
NRBC BLD-RTO: 0 PER 100 WBC
PLATELET # BLD AUTO: 189 K/UL (ref 138–453)
PMV BLD AUTO: 12.2 FL (ref 8.1–13.5)
POTASSIUM SERPL-SCNC: 4 MMOL/L (ref 3.7–5.3)
RBC # BLD AUTO: 4 M/UL (ref 3.95–5.11)
SODIUM SERPL-SCNC: 142 MMOL/L (ref 135–144)
URATE SERPL-MCNC: 5.8 MG/DL (ref 2.4–5.7)
WBC OTHER # BLD: 4.7 K/UL (ref 3.5–11.3)

## 2024-02-12 PROCEDURE — 85652 RBC SED RATE AUTOMATED: CPT

## 2024-02-12 PROCEDURE — 80048 BASIC METABOLIC PNL TOTAL CA: CPT

## 2024-02-12 PROCEDURE — 73630 X-RAY EXAM OF FOOT: CPT

## 2024-02-12 PROCEDURE — 84550 ASSAY OF BLOOD/URIC ACID: CPT

## 2024-02-12 PROCEDURE — 86140 C-REACTIVE PROTEIN: CPT

## 2024-02-12 PROCEDURE — 99284 EMERGENCY DEPT VISIT MOD MDM: CPT

## 2024-02-12 PROCEDURE — 85025 COMPLETE CBC W/AUTO DIFF WBC: CPT

## 2024-02-12 RX ORDER — NAPROXEN 500 MG/1
500 TABLET ORAL 2 TIMES DAILY WITH MEALS
Qty: 14 TABLET | Refills: 0 | Status: SHIPPED | OUTPATIENT
Start: 2024-02-12 | End: 2024-02-19

## 2024-02-12 RX ORDER — DOXYCYCLINE HYCLATE 100 MG
100 TABLET ORAL 2 TIMES DAILY
Qty: 14 TABLET | Refills: 0 | Status: SHIPPED | OUTPATIENT
Start: 2024-02-12 | End: 2024-02-19

## 2024-02-12 NOTE — ED NOTES
Pt to ed with son c/o left great toe pain. Pt denies injury/trauma. Pt states pain and swelling started Saturday. Pt denies hx gout. Pt a/o x4. Pt hard of hearing. Respirations equal and non labored. Pt speaking in full and complete sentences. Call light in reach. Bed locked and in lowest position.

## 2024-02-12 NOTE — ED PROVIDER NOTES
[facility] ED  eMERGENCY dEPARTMENT eNCOUnter      Pt Name: Kaye Danielle  MRN: [id number]  Birthdate 1955  Date of evaluation: 2/12/2024  Provider: SHAYY Piña CNP    CHIEF COMPLAINT       Chief Complaint   Patient presents with    Toe Pain     Left big toe cut in January and pain since then.          HISTORY OF PRESENT ILLNESS  (Location/Symptom, Timing/Onset, Context/Setting, Quality, Duration, Modifying Factors, Severity.)   Kaye Danielle is a 68 y.o. female who presents to the emergency department for evaluation of left great toe pain over the past several days.  Patient states she saw her podiatrist in January and had her nail trimmed.  Denies injury.  No fever or chills.  No history of gout.  Pain is aggravated with walking.      Nursing Notes were reviewed.    ALLERGIES     Amoxicillin and Pcn [penicillins]    CURRENT MEDICATIONS       Previous Medications    ALBUTEROL SULFATE  (90 BASE) MCG/ACT INHALER    Inhale 2 puffs into the lungs every 6 hours as needed for Wheezing    ALCOHOL SWABS (ALCOHOL WIPES) 70 % PADS    Use up to 4 times a day when testing blood sugars    AMLODIPINE (NORVASC) 5 MG TABLET    Take 1 tablet by mouth daily    ASPIRIN (ASPIRIN LOW DOSE) 81 MG CHEWABLE TABLET    Take 1 tablet by mouth daily    ATORVASTATIN (LIPITOR) 40 MG TABLET    Take 1 tablet by mouth nightly    BECLOMETHASONE (QVAR) 80 MCG/ACT INHALER    Inhale 1 puff into the lungs 2 times daily    BIOTIN (BIOTIN 5000) 5 MG CAPS    Take by mouth    BLOOD GLUCOSE TEST STRIPS (TRUE METRIX BLOOD GLUCOSE TEST) STRIP    1 each by In Vitro route 2 times daily As needed.    CITALOPRAM (CELEXA) 10 MG TABLET    Take 1 tablet by mouth daily    GLIPIZIDE (GLUCOTROL) 5 MG TABLET    TAKE ONE-HALF TABLET BY MOUTH TWO TIMES A DAY WITH BREAKFAST AND SUPPER.    HYDRALAZINE (APRESOLINE) 50 MG TABLET    TAKE 1 TABLET BY MOUTH 4 TIMES A DAY    HYDROCHLOROTHIAZIDE (HYDRODIURIL) 50 MG TABLET    Take 1 tablet by

## 2024-02-12 NOTE — DISCHARGE INSTRUCTIONS
Take medications as prescribed.  Follow-up with your podiatrist and your primary care provider.  Return to emergency department for worsening or new symptoms.

## 2024-02-28 ENCOUNTER — TELEPHONE (OUTPATIENT)
Dept: FAMILY MEDICINE CLINIC | Age: 69
End: 2024-02-28

## 2024-02-28 NOTE — TELEPHONE ENCOUNTER
----- Message from Chaz Garcia sent at 2/28/2024 11:25 AM EST -----  Subject: Appointment Request    Reason for Call: Established Patient Appointment needed: Urgent Abdominal   Pain    QUESTIONS    Reason for appointment request? Available appointments did not meet   patient need     Additional Information for Provider? Patient is requesting to be seen for   Back and constipation issues   ---------------------------------------------------------------------------  --------------  CALL BACK INFO  [phone]; OK to leave message on voicemail  ---------------------------------------------------------------------------  --------------  SCRIPT ANSWERS

## 2024-03-21 ENCOUNTER — OFFICE VISIT (OUTPATIENT)
Dept: FAMILY MEDICINE CLINIC | Age: 69
End: 2024-03-21
Payer: COMMERCIAL

## 2024-03-21 VITALS
HEIGHT: 64 IN | DIASTOLIC BLOOD PRESSURE: 60 MMHG | OXYGEN SATURATION: 99 % | TEMPERATURE: 97.7 F | HEART RATE: 93 BPM | BODY MASS INDEX: 35.07 KG/M2 | WEIGHT: 205.4 LBS | SYSTOLIC BLOOD PRESSURE: 100 MMHG

## 2024-03-21 DIAGNOSIS — R42 DIZZINESS: ICD-10-CM

## 2024-03-21 DIAGNOSIS — I10 PRIMARY HYPERTENSION: ICD-10-CM

## 2024-03-21 DIAGNOSIS — E11.69 TYPE 2 DIABETES MELLITUS WITH OTHER SPECIFIED COMPLICATION, WITHOUT LONG-TERM CURRENT USE OF INSULIN (HCC): ICD-10-CM

## 2024-03-21 DIAGNOSIS — Z59.48 FOOD DEPRIVATION: Primary | ICD-10-CM

## 2024-03-21 DIAGNOSIS — F39 MOOD DISORDER (HCC): ICD-10-CM

## 2024-03-21 DIAGNOSIS — E55.9 VITAMIN D DEFICIENCY DISEASE: ICD-10-CM

## 2024-03-21 DIAGNOSIS — R53.82 CHRONIC FATIGUE: ICD-10-CM

## 2024-03-21 DIAGNOSIS — E11.00 TYPE 2 DIABETES MELLITUS WITH HYPEROSMOLARITY WITHOUT COMA, WITHOUT LONG-TERM CURRENT USE OF INSULIN (HCC): ICD-10-CM

## 2024-03-21 DIAGNOSIS — E78.5 DYSLIPIDEMIA: ICD-10-CM

## 2024-03-21 DIAGNOSIS — E04.2 MULTINODULAR GOITER (NONTOXIC): ICD-10-CM

## 2024-03-21 DIAGNOSIS — E66.9 CLASS 2 OBESITY WITH BODY MASS INDEX (BMI) OF 35.0 TO 35.9 IN ADULT, UNSPECIFIED OBESITY TYPE, UNSPECIFIED WHETHER SERIOUS COMORBIDITY PRESENT: ICD-10-CM

## 2024-03-21 DIAGNOSIS — B35.1 ONYCHOMYCOSIS: ICD-10-CM

## 2024-03-21 DIAGNOSIS — E53.9 VITAMIN B DEFICIENCY: ICD-10-CM

## 2024-03-21 DIAGNOSIS — E66.9 OBESITY (BMI 30-39.9): ICD-10-CM

## 2024-03-21 DIAGNOSIS — E55.9 VITAMIN D DEFICIENCY: ICD-10-CM

## 2024-03-21 DIAGNOSIS — Z12.11 SCREEN FOR COLON CANCER: ICD-10-CM

## 2024-03-21 DIAGNOSIS — Z11.59 NEED FOR HEPATITIS C SCREENING TEST: ICD-10-CM

## 2024-03-21 DIAGNOSIS — E04.9 THYROID GOITER: ICD-10-CM

## 2024-03-21 DIAGNOSIS — E78.5 HYPERLIPIDEMIA WITH TARGET LDL LESS THAN 100: ICD-10-CM

## 2024-03-21 PROBLEM — R07.89 OTHER CHEST PAIN: Status: RESOLVED | Noted: 2023-11-26 | Resolved: 2024-03-21

## 2024-03-21 PROBLEM — R51.9 HEADACHE: Status: RESOLVED | Noted: 2023-11-26 | Resolved: 2024-03-21

## 2024-03-21 PROBLEM — R51.9 HEADACHE: Status: ACTIVE | Noted: 2023-11-26

## 2024-03-21 PROBLEM — R07.89 OTHER CHEST PAIN: Status: ACTIVE | Noted: 2023-11-26

## 2024-03-21 PROCEDURE — G8399 PT W/DXA RESULTS DOCUMENT: HCPCS | Performed by: FAMILY MEDICINE

## 2024-03-21 PROCEDURE — 2022F DILAT RTA XM EVC RTNOPTHY: CPT | Performed by: FAMILY MEDICINE

## 2024-03-21 PROCEDURE — G8427 DOCREV CUR MEDS BY ELIG CLIN: HCPCS | Performed by: FAMILY MEDICINE

## 2024-03-21 PROCEDURE — 1036F TOBACCO NON-USER: CPT | Performed by: FAMILY MEDICINE

## 2024-03-21 PROCEDURE — 1090F PRES/ABSN URINE INCON ASSESS: CPT | Performed by: FAMILY MEDICINE

## 2024-03-21 PROCEDURE — 3046F HEMOGLOBIN A1C LEVEL >9.0%: CPT | Performed by: FAMILY MEDICINE

## 2024-03-21 PROCEDURE — 3017F COLORECTAL CA SCREEN DOC REV: CPT | Performed by: FAMILY MEDICINE

## 2024-03-21 PROCEDURE — 1123F ACP DISCUSS/DSCN MKR DOCD: CPT | Performed by: FAMILY MEDICINE

## 2024-03-21 PROCEDURE — G8484 FLU IMMUNIZE NO ADMIN: HCPCS | Performed by: FAMILY MEDICINE

## 2024-03-21 PROCEDURE — 99204 OFFICE O/P NEW MOD 45 MIN: CPT | Performed by: FAMILY MEDICINE

## 2024-03-21 PROCEDURE — 3074F SYST BP LT 130 MM HG: CPT | Performed by: FAMILY MEDICINE

## 2024-03-21 PROCEDURE — G8417 CALC BMI ABV UP PARAM F/U: HCPCS | Performed by: FAMILY MEDICINE

## 2024-03-21 PROCEDURE — 3078F DIAST BP <80 MM HG: CPT | Performed by: FAMILY MEDICINE

## 2024-03-21 RX ORDER — OXYBUTYNIN CHLORIDE 5 MG/1
5 TABLET ORAL DAILY
COMMUNITY

## 2024-03-21 SDOH — ECONOMIC STABILITY: FOOD INSECURITY: WITHIN THE PAST 12 MONTHS, THE FOOD YOU BOUGHT JUST DIDN'T LAST AND YOU DIDN'T HAVE MONEY TO GET MORE.: SOMETIMES TRUE

## 2024-03-21 SDOH — ECONOMIC STABILITY: FOOD INSECURITY: WITHIN THE PAST 12 MONTHS, YOU WORRIED THAT YOUR FOOD WOULD RUN OUT BEFORE YOU GOT MONEY TO BUY MORE.: SOMETIMES TRUE

## 2024-03-21 SDOH — ECONOMIC STABILITY: HOUSING INSECURITY
IN THE LAST 12 MONTHS, WAS THERE A TIME WHEN YOU DID NOT HAVE A STEADY PLACE TO SLEEP OR SLEPT IN A SHELTER (INCLUDING NOW)?: NO

## 2024-03-21 SDOH — ECONOMIC STABILITY: INCOME INSECURITY: HOW HARD IS IT FOR YOU TO PAY FOR THE VERY BASICS LIKE FOOD, HOUSING, MEDICAL CARE, AND HEATING?: SOMEWHAT HARD

## 2024-03-21 SDOH — ECONOMIC STABILITY - FOOD INSECURITY: OTHER SPECIFIED LACK OF ADEQUATE FOOD: Z59.48

## 2024-03-21 ASSESSMENT — ENCOUNTER SYMPTOMS
CONSTIPATION: 0
EYE REDNESS: 0
EYE PAIN: 0
DIARRHEA: 0
ABDOMINAL PAIN: 0
SORE THROAT: 0
BLOOD IN STOOL: 0
STRIDOR: 0
EYE DISCHARGE: 0
BACK PAIN: 0
VOMITING: 0
SHORTNESS OF BREATH: 0

## 2024-03-21 ASSESSMENT — PATIENT HEALTH QUESTIONNAIRE - PHQ9
7. TROUBLE CONCENTRATING ON THINGS, SUCH AS READING THE NEWSPAPER OR WATCHING TELEVISION: NOT AT ALL
4. FEELING TIRED OR HAVING LITTLE ENERGY: SEVERAL DAYS
SUM OF ALL RESPONSES TO PHQ QUESTIONS 1-9: 4
2. FEELING DOWN, DEPRESSED OR HOPELESS: NOT AT ALL
6. FEELING BAD ABOUT YOURSELF - OR THAT YOU ARE A FAILURE OR HAVE LET YOURSELF OR YOUR FAMILY DOWN: NOT AT ALL
5. POOR APPETITE OR OVEREATING: NOT AT ALL
8. MOVING OR SPEAKING SO SLOWLY THAT OTHER PEOPLE COULD HAVE NOTICED. OR THE OPPOSITE, BEING SO FIGETY OR RESTLESS THAT YOU HAVE BEEN MOVING AROUND A LOT MORE THAN USUAL: SEVERAL DAYS
9. THOUGHTS THAT YOU WOULD BE BETTER OFF DEAD, OR OF HURTING YOURSELF: NOT AT ALL
SUM OF ALL RESPONSES TO PHQ QUESTIONS 1-9: 4
SUM OF ALL RESPONSES TO PHQ QUESTIONS 1-9: 4
10. IF YOU CHECKED OFF ANY PROBLEMS, HOW DIFFICULT HAVE THESE PROBLEMS MADE IT FOR YOU TO DO YOUR WORK, TAKE CARE OF THINGS AT HOME, OR GET ALONG WITH OTHER PEOPLE: SOMEWHAT DIFFICULT
SUM OF ALL RESPONSES TO PHQ QUESTIONS 1-9: 4
1. LITTLE INTEREST OR PLEASURE IN DOING THINGS: SEVERAL DAYS
SUM OF ALL RESPONSES TO PHQ9 QUESTIONS 1 & 2: 1
3. TROUBLE FALLING OR STAYING ASLEEP: SEVERAL DAYS

## 2024-03-21 NOTE — PATIENT INSTRUCTIONS
programs including Medicaid, SNAP(food stamps), TANF (cash assistance), and childcare assistance.  Phone Number: [phone]        [facility] Office on Aging of [facility] ([facility]):  What they offer: Medical cab rides for seniors and referral to community resources  Phone Number: [phone]     [facility] Agency on Aging, [facility] 5:    [place], [place], [place],                   [facility]:  What they offer: Referral to transportation and other resources for seniors.  Phone Number: [phone]     [facility]) [place], [place]:  What they offer: Food and other assistance.   Phone Number: [phone]

## 2024-03-21 NOTE — PROGRESS NOTES
Subjective:      Patient ID: Kaye Danielle is a 68 y.o. female.    States has been seeing PCP at [facility] in [place].  States last labs were a year ago.  States losing weight and feeling dizzy last few weeks.  Has been feeling dizzy on and off for months  Has been on high dose HCTZ for years  BP is low today  Sees Dr Avendano - for her diabetes. States feels thirsty all the time recently.  Also going on for a month .  Mood,sleep , anxiety ok.  H/O depression in the past- sees [facility] - NP  [facility].Decided to go to [facility].  H/O leg swelling in the past . H/O arthritis both knees in past - pain affects her sleep.  Was seeing ortho in the past - but insurance denies gel shots.  States SOB with exertion- uses albuterol as needed approx once a day  Sees [facility] every 2-3 months - has hearing aid. States runs out of food and needs a referral- given referral to rachael Santoro recently- increased water, prunes, cut down HCTZ to 25 from 50 recommended  Review of Systems   Constitutional:  Negative for chills and fever.   HENT:  Negative for congestion, ear pain, hearing loss, nosebleeds, sore throat and tinnitus.    Eyes:  Negative for photophobia, pain, discharge and redness.   Respiratory:  Negative for cough, shortness of breath and stridor.    Cardiovascular:  Negative for chest pain, palpitations and leg swelling.   Gastrointestinal:  Negative for abdominal pain, blood in stool, constipation, diarrhea, nausea and vomiting.   Endocrine: Negative for polydipsia.   Genitourinary:  Negative for dysuria, flank pain, frequency, hematuria and urgency.   Musculoskeletal:  Negative for back pain, myalgias and neck pain.   Skin:  Negative for rash.   Allergic/Immunologic: Negative for environmental allergies.   Neurological:  Negative for dizziness, tremors, seizures, weakness and headaches.   Hematological:  Does not bruise/bleed easily.   Psychiatric/Behavioral:

## 2024-03-25 ENCOUNTER — HOSPITAL ENCOUNTER (OUTPATIENT)
Age: 69
Discharge: HOME | End: 2024-03-25
Payer: COMMERCIAL

## 2024-03-25 DIAGNOSIS — E04.2 MULTINODULAR GOITER (NONTOXIC): ICD-10-CM

## 2024-03-25 DIAGNOSIS — E11.69 TYPE 2 DIABETES MELLITUS WITH OTHER SPECIFIED COMPLICATION, WITHOUT LONG-TERM CURRENT USE OF INSULIN (HCC): ICD-10-CM

## 2024-03-25 DIAGNOSIS — E55.9 VITAMIN D DEFICIENCY: ICD-10-CM

## 2024-03-25 DIAGNOSIS — E53.9 VITAMIN B DEFICIENCY: ICD-10-CM

## 2024-03-25 DIAGNOSIS — Z11.59 NEED FOR HEPATITIS C SCREENING TEST: ICD-10-CM

## 2024-03-25 DIAGNOSIS — E66.9 CLASS 2 OBESITY WITH BODY MASS INDEX (BMI) OF 35.0 TO 35.9 IN ADULT, UNSPECIFIED OBESITY TYPE, UNSPECIFIED WHETHER SERIOUS COMORBIDITY PRESENT: ICD-10-CM

## 2024-03-25 DIAGNOSIS — R53.82 CHRONIC FATIGUE: ICD-10-CM

## 2024-03-25 DIAGNOSIS — E78.5 DYSLIPIDEMIA: ICD-10-CM

## 2024-03-25 LAB
25(OH)D3 SERPL-MCNC: 58.2 NG/ML (ref 30–100)
ALBUMIN SERPL-MCNC: 3.9 G/DL (ref 3.5–5.2)
ALBUMIN/GLOB SERPL: 1 {RATIO} (ref 1–2.5)
ALP SERPL-CCNC: 52 U/L (ref 35–104)
ALT SERPL-CCNC: 33 U/L (ref 10–35)
ANION GAP SERPL CALCULATED.3IONS-SCNC: 12 MMOL/L (ref 9–16)
AST SERPL-CCNC: 25 U/L (ref 10–35)
BILIRUB SERPL-MCNC: 0.6 MG/DL (ref 0–1.2)
BUN SERPL-MCNC: 22 MG/DL (ref 8–23)
CALCIUM SERPL-MCNC: 10.5 MG/DL (ref 8.6–10.4)
CHLORIDE SERPL-SCNC: 108 MMOL/L (ref 98–107)
CHOLEST SERPL-MCNC: 122 MG/DL (ref 0–199)
CHOLESTEROL/HDL RATIO: 3
CO2 SERPL-SCNC: 26 MMOL/L (ref 20–31)
CREAT SERPL-MCNC: 0.5 MG/DL (ref 0.5–0.9)
CREAT UR-MCNC: 53.3 MG/DL (ref 28–217)
ERYTHROCYTE [DISTWIDTH] IN BLOOD BY AUTOMATED COUNT: 14.9 % (ref 11.8–14.4)
EST. AVERAGE GLUCOSE BLD GHB EST-MCNC: 114 MG/DL
FOLATE SERPL-MCNC: 17.3 NG/ML (ref 4.8–24.2)
GLUCOSE SERPL-MCNC: 86 MG/DL (ref 74–99)
HBA1C MFR BLD: 5.6 % (ref 4–6)
HCT VFR BLD AUTO: 32.7 % (ref 36.3–47.1)
HCV AB SERPL QL IA: NONREACTIVE
HDLC SERPL-MCNC: 49 MG/DL
HGB BLD-MCNC: 10.8 G/DL (ref 11.9–15.1)
LDLC SERPL CALC-MCNC: 61 MG/DL (ref 0–100)
MCH RBC QN AUTO: 26.7 PG (ref 25.2–33.5)
MCHC RBC AUTO-ENTMCNC: 33 G/DL (ref 28.4–34.8)
MCV RBC AUTO: 80.7 FL (ref 82.6–102.9)
MICROALBUMIN UR-MCNC: 13 MG/L (ref 0–20)
MICROALBUMIN/CREAT UR-RTO: 24 MCG/MG CREAT (ref 0–25)
NRBC BLD-RTO: 0 PER 100 WBC
PLATELET # BLD AUTO: 182 K/UL (ref 138–453)
PMV BLD AUTO: 12.6 FL (ref 8.1–13.5)
POTASSIUM SERPL-SCNC: 3.7 MMOL/L (ref 3.7–5.3)
PROT SERPL-MCNC: 7.2 G/DL (ref 6.6–8.7)
RBC # BLD AUTO: 4.05 M/UL (ref 3.95–5.11)
SODIUM SERPL-SCNC: 146 MMOL/L (ref 136–145)
T4 FREE SERPL-MCNC: 4.7 NG/DL (ref 0.92–1.68)
TRIGL SERPL-MCNC: 61 MG/DL
TSH SERPL DL<=0.05 MIU/L-ACNC: <0.01 UIU/ML (ref 0.27–4.2)
VIT B12 SERPL-MCNC: 863 PG/ML (ref 232–1245)
VLDLC SERPL CALC-MCNC: 12 MG/DL
WBC OTHER # BLD: 3.9 K/UL (ref 3.5–11.3)

## 2024-03-25 PROCEDURE — 80061 LIPID PANEL: CPT

## 2024-03-25 PROCEDURE — 82607 VITAMIN B-12: CPT

## 2024-03-25 PROCEDURE — 36415 COLL VENOUS BLD VENIPUNCTURE: CPT

## 2024-03-25 PROCEDURE — 84439 ASSAY OF FREE THYROXINE: CPT

## 2024-03-25 PROCEDURE — 83036 HEMOGLOBIN GLYCOSYLATED A1C: CPT

## 2024-03-25 PROCEDURE — 82570 ASSAY OF URINE CREATININE: CPT

## 2024-03-25 PROCEDURE — 84443 ASSAY THYROID STIM HORMONE: CPT

## 2024-03-25 PROCEDURE — 83527 ASSAY OF INSULIN: CPT

## 2024-03-25 PROCEDURE — 86803 HEPATITIS C AB TEST: CPT

## 2024-03-25 PROCEDURE — 86800 THYROGLOBULIN ANTIBODY: CPT

## 2024-03-25 PROCEDURE — 82746 ASSAY OF FOLIC ACID SERUM: CPT

## 2024-03-25 PROCEDURE — 85027 COMPLETE CBC AUTOMATED: CPT

## 2024-03-25 PROCEDURE — 80053 COMPREHEN METABOLIC PANEL: CPT

## 2024-03-25 PROCEDURE — 82306 VITAMIN D 25 HYDROXY: CPT

## 2024-03-25 PROCEDURE — 82043 UR ALBUMIN QUANTITATIVE: CPT

## 2024-03-25 PROCEDURE — 83525 ASSAY OF INSULIN: CPT

## 2024-03-25 PROCEDURE — 86376 MICROSOMAL ANTIBODY EACH: CPT

## 2024-03-26 LAB
THYROGLOBULIN AB: 19 IU/ML (ref 0–40)
THYROPEROXIDASE AB SERPL IA-ACNC: <4 IU/ML (ref 0–25)

## 2024-03-27 LAB
INSULIN FREE: 4 UIU/ML (ref 3–25)
INSULIN: 7 UIU/ML (ref 3–25)

## 2024-03-28 ENCOUNTER — TELEPHONE (OUTPATIENT)
Dept: GASTROENTEROLOGY | Age: 69
End: 2024-03-28

## 2024-03-28 ENCOUNTER — OFFICE VISIT (OUTPATIENT)
Dept: FAMILY MEDICINE CLINIC | Age: 69
End: 2024-03-28

## 2024-03-28 VITALS
HEART RATE: 98 BPM | WEIGHT: 202.4 LBS | DIASTOLIC BLOOD PRESSURE: 70 MMHG | OXYGEN SATURATION: 98 % | HEIGHT: 64 IN | TEMPERATURE: 97.2 F | SYSTOLIC BLOOD PRESSURE: 123 MMHG | BODY MASS INDEX: 34.56 KG/M2

## 2024-03-28 DIAGNOSIS — I10 PRIMARY HYPERTENSION: ICD-10-CM

## 2024-03-28 DIAGNOSIS — E05.90 HYPERTHYROIDISM: ICD-10-CM

## 2024-03-28 DIAGNOSIS — K21.9 GASTROESOPHAGEAL REFLUX DISEASE, UNSPECIFIED WHETHER ESOPHAGITIS PRESENT: ICD-10-CM

## 2024-03-28 DIAGNOSIS — R19.7 DIARRHEA, UNSPECIFIED TYPE: ICD-10-CM

## 2024-03-28 DIAGNOSIS — F41.1 GENERALIZED ANXIETY DISORDER: ICD-10-CM

## 2024-03-28 DIAGNOSIS — R53.82 CHRONIC FATIGUE: ICD-10-CM

## 2024-03-28 DIAGNOSIS — F39 MOOD DISORDER (HCC): ICD-10-CM

## 2024-03-28 DIAGNOSIS — E87.0 HYPERNATREMIA: ICD-10-CM

## 2024-03-28 DIAGNOSIS — E04.9 THYROID GOITER: ICD-10-CM

## 2024-03-28 DIAGNOSIS — E83.52 HYPERCALCEMIA: ICD-10-CM

## 2024-03-28 DIAGNOSIS — E11.69 TYPE 2 DIABETES MELLITUS WITH OTHER SPECIFIED COMPLICATION, WITHOUT LONG-TERM CURRENT USE OF INSULIN (HCC): ICD-10-CM

## 2024-03-28 DIAGNOSIS — E55.9 VITAMIN D DEFICIENCY: ICD-10-CM

## 2024-03-28 DIAGNOSIS — R10.13 EPIGASTRIC PAIN: ICD-10-CM

## 2024-03-28 DIAGNOSIS — I10 ESSENTIAL HYPERTENSION: ICD-10-CM

## 2024-03-28 DIAGNOSIS — Z00.00 INITIAL MEDICARE ANNUAL WELLNESS VISIT: Primary | ICD-10-CM

## 2024-03-28 DIAGNOSIS — E05.00 GOITER, TOXIC DIFFUSE: ICD-10-CM

## 2024-03-28 PROBLEM — H61.20 CERUMEN IMPACTION: Status: ACTIVE | Noted: 2024-01-08

## 2024-03-28 PROBLEM — H52.03 HYPEROPIA OF BOTH EYES: Status: ACTIVE | Noted: 2023-10-27

## 2024-03-28 RX ORDER — ACETAMINOPHEN 160 MG
2000 TABLET,DISINTEGRATING ORAL DAILY
Qty: 90 CAPSULE | Refills: 5 | Status: SHIPPED | OUTPATIENT
Start: 2024-03-28

## 2024-03-28 RX ORDER — METFORMIN HYDROCHLORIDE 500 MG/1
500 TABLET, EXTENDED RELEASE ORAL
Qty: 90 TABLET | Refills: 1 | Status: SHIPPED | OUTPATIENT
Start: 2024-03-28

## 2024-03-28 RX ORDER — METFORMIN HYDROCHLORIDE 500 MG/1
500 TABLET, EXTENDED RELEASE ORAL 2 TIMES DAILY
COMMUNITY
Start: 2024-03-20 | End: 2024-03-28 | Stop reason: DRUGHIGH

## 2024-03-28 RX ORDER — METOPROLOL SUCCINATE 25 MG/1
25 TABLET, EXTENDED RELEASE ORAL DAILY
Qty: 30 TABLET | Refills: 3 | Status: SHIPPED | OUTPATIENT
Start: 2024-03-28

## 2024-03-28 ASSESSMENT — PATIENT HEALTH QUESTIONNAIRE - PHQ9
SUM OF ALL RESPONSES TO PHQ9 QUESTIONS 1 & 2: 0
SUM OF ALL RESPONSES TO PHQ QUESTIONS 1-9: 0
1. LITTLE INTEREST OR PLEASURE IN DOING THINGS: NOT AT ALL
2. FEELING DOWN, DEPRESSED OR HOPELESS: NOT AT ALL

## 2024-03-28 ASSESSMENT — LIFESTYLE VARIABLES: HOW MANY STANDARD DRINKS CONTAINING ALCOHOL DO YOU HAVE ON A TYPICAL DAY: PATIENT DOES NOT DRINK

## 2024-03-28 NOTE — PROGRESS NOTES
No    Intervention:  has NO advanced directive - information provided    Advance Care Planning   Discussed the patient’s choices for care and treatment preferences in case of a health event that adversely affects decision-making abilities or is life-limiting. Recommended the patient document care preferences in state-specific advance directives. Also reviewed the process of designating a trusted capable adult as an Agent (or Health Care Power of ) to make health care decisions for the patient if the patient becomes unable due to incapacity. Patient was asked to complete advance directive forms, if not already done, and to provide a dated, signed and witnessed (or notarized) copy, per the forms' requirements, to the practice office.    Time spent (minutes):  5        CV Risk Counseling:  Patient was asked about her current diet and exercise habits, and personalized advice was provided regarding recommended lifestyle changes. Patient's individual cardiovascular disease risk factors, including advanced age (> 55 for men, > 65 for women), diabetes mellitus, dyslipidemia, hypertension, obesity (BMI >= 30), and sedentary lifestyle, were discussed, as well as the likely benefits of lifestyle changes. Based upon patient's motivation to change her behavior, the following plan was agreed upon to work toward lowering cardiovascular disease risk: at least 150 minutes of exercise/week.  Aspirin use for primary prevention of cardiovascular disease for men 45-79 and women 55-79: Indicated- continue daily aspirin. Educational materials for lifestyle changes were provided. Patient will follow-up in 3 month(s) with cardiologist. Provider spent 5 minutes counseling patient.  Sees Dr Mihai chan who started her on a baby asa for her heart   States           Objective   Vitals:    03/28/24 1439   BP: 123/70   Pulse: 98   Temp: 97.2 °F (36.2 °C)   SpO2: 98%   Weight: 91.8 kg (202 lb 6.4 oz)   Height: 1.626 m (5' 4\")      Body

## 2024-03-28 NOTE — PATIENT INSTRUCTIONS
after eating hard foods, such as apples.  See a dentist regularly. Many experts recommend checkups every 6 months.  Keep the dentist up to date on any new medications the person is taking.  Encourage a balanced diet that includes whole grains, vegetables, and fruits, and that is low in saturated fat and sodium.  Encourage the person you're caring for not to use tobacco products. They can affect dental and general health.  Many older adults have a fixed income and feel that they can't afford dental care. But most [facility] and [facility] have programs in which dentists help older adults by lowering fees. Contact your area's public health offices or  for information about dental care in your area.  Using a toothbrush  Older adults with arthritis sometimes have trouble brushing their teeth because they can't easily hold the toothbrush. Their hands and fingers may be stiff, painful, or weak. If this is the case, you can:  Offer an electric toothbrush.  Enlarge the handle of a non-electric toothbrush by wrapping a sponge, an elastic bandage, or adhesive tape around it.  Push the toothbrush handle through a ball made of rubber or soft foam.  Make the handle longer and thicker by taping Popsicle sticks or tongue depressors to it.  You may also be able to buy special toothbrushes, toothpaste dispensers, and floss holders.  Your doctor may recommend a soft-bristle toothbrush if the person you care for bleeds easily. Bleeding can happen because of a health problem or from certain medicines.  A toothpaste for sensitive teeth may help if the person you care for has sensitive teeth.  How do you brush and floss someone's teeth?  If the person you are caring for has a hard time cleaning their teeth on their own, you may need to brush and floss their teeth for them. It may be easiest to have the person sit and face away from you, and to sit or stand behind them. That way you can steady their head against your arm as you

## 2024-04-11 ENCOUNTER — OFFICE VISIT (OUTPATIENT)
Dept: FAMILY MEDICINE CLINIC | Age: 69
End: 2024-04-11
Payer: COMMERCIAL

## 2024-04-11 VITALS
DIASTOLIC BLOOD PRESSURE: 80 MMHG | TEMPERATURE: 97.3 F | WEIGHT: 208.4 LBS | OXYGEN SATURATION: 99 % | BODY MASS INDEX: 35.58 KG/M2 | HEART RATE: 99 BPM | SYSTOLIC BLOOD PRESSURE: 139 MMHG | HEIGHT: 64 IN

## 2024-04-11 DIAGNOSIS — F39 MOOD DISORDER (HCC): ICD-10-CM

## 2024-04-11 DIAGNOSIS — N32.89 IRRITABLE BLADDER: Primary | ICD-10-CM

## 2024-04-11 DIAGNOSIS — I10 PRIMARY HYPERTENSION: ICD-10-CM

## 2024-04-11 DIAGNOSIS — E66.9 OBESITY (BMI 30-39.9): ICD-10-CM

## 2024-04-11 DIAGNOSIS — E11.00 TYPE 2 DIABETES MELLITUS WITH HYPEROSMOLARITY WITHOUT COMA, WITHOUT LONG-TERM CURRENT USE OF INSULIN (HCC): ICD-10-CM

## 2024-04-11 DIAGNOSIS — R53.82 CHRONIC FATIGUE: ICD-10-CM

## 2024-04-11 DIAGNOSIS — E55.9 VITAMIN D DEFICIENCY DISEASE: ICD-10-CM

## 2024-04-11 DIAGNOSIS — E78.5 HYPERLIPIDEMIA WITH TARGET LDL LESS THAN 100: ICD-10-CM

## 2024-04-11 DIAGNOSIS — E05.00 GOITER, TOXIC DIFFUSE: ICD-10-CM

## 2024-04-11 DIAGNOSIS — K21.9 GASTROESOPHAGEAL REFLUX DISEASE, UNSPECIFIED WHETHER ESOPHAGITIS PRESENT: ICD-10-CM

## 2024-04-11 PROCEDURE — 99214 OFFICE O/P EST MOD 30 MIN: CPT | Performed by: FAMILY MEDICINE

## 2024-04-11 PROCEDURE — 2022F DILAT RTA XM EVC RTNOPTHY: CPT | Performed by: FAMILY MEDICINE

## 2024-04-11 PROCEDURE — G8399 PT W/DXA RESULTS DOCUMENT: HCPCS | Performed by: FAMILY MEDICINE

## 2024-04-11 PROCEDURE — 3078F DIAST BP <80 MM HG: CPT | Performed by: FAMILY MEDICINE

## 2024-04-11 PROCEDURE — G8427 DOCREV CUR MEDS BY ELIG CLIN: HCPCS | Performed by: FAMILY MEDICINE

## 2024-04-11 PROCEDURE — 1036F TOBACCO NON-USER: CPT | Performed by: FAMILY MEDICINE

## 2024-04-11 PROCEDURE — 1090F PRES/ABSN URINE INCON ASSESS: CPT | Performed by: FAMILY MEDICINE

## 2024-04-11 PROCEDURE — 3044F HG A1C LEVEL LT 7.0%: CPT | Performed by: FAMILY MEDICINE

## 2024-04-11 PROCEDURE — 1123F ACP DISCUSS/DSCN MKR DOCD: CPT | Performed by: FAMILY MEDICINE

## 2024-04-11 PROCEDURE — G8417 CALC BMI ABV UP PARAM F/U: HCPCS | Performed by: FAMILY MEDICINE

## 2024-04-11 PROCEDURE — 3017F COLORECTAL CA SCREEN DOC REV: CPT | Performed by: FAMILY MEDICINE

## 2024-04-11 PROCEDURE — 3075F SYST BP GE 130 - 139MM HG: CPT | Performed by: FAMILY MEDICINE

## 2024-04-11 RX ORDER — OXYBUTYNIN CHLORIDE 10 MG/1
10 TABLET, EXTENDED RELEASE ORAL DAILY
Qty: 90 TABLET | Refills: 0 | Status: SHIPPED | OUTPATIENT
Start: 2024-04-11

## 2024-04-11 RX ORDER — METOPROLOL SUCCINATE 50 MG/1
50 TABLET, EXTENDED RELEASE ORAL DAILY
Qty: 90 TABLET | Refills: 0 | Status: SHIPPED | OUTPATIENT
Start: 2024-04-11

## 2024-04-11 RX ORDER — ACETAMINOPHEN 160 MG
2000 TABLET,DISINTEGRATING ORAL DAILY
Qty: 90 CAPSULE | Refills: 5 | Status: SHIPPED | OUTPATIENT
Start: 2024-04-11

## 2024-04-11 RX ORDER — FAMOTIDINE 20 MG/1
20 TABLET, FILM COATED ORAL 2 TIMES DAILY
Qty: 180 TABLET | Refills: 0 | Status: SHIPPED | OUTPATIENT
Start: 2024-04-11

## 2024-04-11 RX ORDER — METFORMIN HYDROCHLORIDE 500 MG/1
500 TABLET, EXTENDED RELEASE ORAL
Qty: 90 TABLET | Refills: 1 | Status: SHIPPED | OUTPATIENT
Start: 2024-04-11

## 2024-04-11 ASSESSMENT — PATIENT HEALTH QUESTIONNAIRE - PHQ9
3. TROUBLE FALLING OR STAYING ASLEEP: NOT AT ALL
5. POOR APPETITE OR OVEREATING: SEVERAL DAYS
1. LITTLE INTEREST OR PLEASURE IN DOING THINGS: NOT AT ALL
3. TROUBLE FALLING OR STAYING ASLEEP: NOT AT ALL
6. FEELING BAD ABOUT YOURSELF - OR THAT YOU ARE A FAILURE OR HAVE LET YOURSELF OR YOUR FAMILY DOWN: NOT AT ALL
SUM OF ALL RESPONSES TO PHQ QUESTIONS 1-9: 5
9. THOUGHTS THAT YOU WOULD BE BETTER OFF DEAD, OR OF HURTING YOURSELF: NOT AT ALL
SUM OF ALL RESPONSES TO PHQ9 QUESTIONS 1 & 2: 1
SUM OF ALL RESPONSES TO PHQ QUESTIONS 1-9: 5
5. POOR APPETITE OR OVEREATING: NOT AT ALL
6. FEELING BAD ABOUT YOURSELF - OR THAT YOU ARE A FAILURE OR HAVE LET YOURSELF OR YOUR FAMILY DOWN: NOT AT ALL
10. IF YOU CHECKED OFF ANY PROBLEMS, HOW DIFFICULT HAVE THESE PROBLEMS MADE IT FOR YOU TO DO YOUR WORK, TAKE CARE OF THINGS AT HOME, OR GET ALONG WITH OTHER PEOPLE: VERY DIFFICULT
2. FEELING DOWN, DEPRESSED OR HOPELESS: SEVERAL DAYS
10. IF YOU CHECKED OFF ANY PROBLEMS, HOW DIFFICULT HAVE THESE PROBLEMS MADE IT FOR YOU TO DO YOUR WORK, TAKE CARE OF THINGS AT HOME, OR GET ALONG WITH OTHER PEOPLE: VERY DIFFICULT
SUM OF ALL RESPONSES TO PHQ QUESTIONS 1-9: 5
4. FEELING TIRED OR HAVING LITTLE ENERGY: SEVERAL DAYS
8. MOVING OR SPEAKING SO SLOWLY THAT OTHER PEOPLE COULD HAVE NOTICED. OR THE OPPOSITE, BEING SO FIGETY OR RESTLESS THAT YOU HAVE BEEN MOVING AROUND A LOT MORE THAN USUAL: NOT AT ALL
SUM OF ALL RESPONSES TO PHQ QUESTIONS 1-9: 3
7. TROUBLE CONCENTRATING ON THINGS, SUCH AS READING THE NEWSPAPER OR WATCHING TELEVISION: NOT AT ALL
1. LITTLE INTEREST OR PLEASURE IN DOING THINGS: NOT AT ALL
SUM OF ALL RESPONSES TO PHQ QUESTIONS 1-9: 5
SUM OF ALL RESPONSES TO PHQ QUESTIONS 1-9: 3
9. THOUGHTS THAT YOU WOULD BE BETTER OFF DEAD, OR OF HURTING YOURSELF: NOT AT ALL
SUM OF ALL RESPONSES TO PHQ QUESTIONS 1-9: 3
2. FEELING DOWN, DEPRESSED OR HOPELESS: MORE THAN HALF THE DAYS
7. TROUBLE CONCENTRATING ON THINGS, SUCH AS READING THE NEWSPAPER OR WATCHING TELEVISION: SEVERAL DAYS
8. MOVING OR SPEAKING SO SLOWLY THAT OTHER PEOPLE COULD HAVE NOTICED. OR THE OPPOSITE, BEING SO FIGETY OR RESTLESS THAT YOU HAVE BEEN MOVING AROUND A LOT MORE THAN USUAL: NOT AT ALL
SUM OF ALL RESPONSES TO PHQ9 QUESTIONS 1 & 2: 2
SUM OF ALL RESPONSES TO PHQ QUESTIONS 1-9: 3
4. FEELING TIRED OR HAVING LITTLE ENERGY: MORE THAN HALF THE DAYS

## 2024-04-11 ASSESSMENT — ANXIETY QUESTIONNAIRES
7. FEELING AFRAID AS IF SOMETHING AWFUL MIGHT HAPPEN: NOT AT ALL
5. BEING SO RESTLESS THAT IT IS HARD TO SIT STILL: NOT AT ALL
2. NOT BEING ABLE TO STOP OR CONTROL WORRYING: NOT AT ALL
IF YOU CHECKED OFF ANY PROBLEMS ON THIS QUESTIONNAIRE, HOW DIFFICULT HAVE THESE PROBLEMS MADE IT FOR YOU TO DO YOUR WORK, TAKE CARE OF THINGS AT HOME, OR GET ALONG WITH OTHER PEOPLE: NOT DIFFICULT AT ALL
GAD7 TOTAL SCORE: 0
1. FEELING NERVOUS, ANXIOUS, OR ON EDGE: NOT AT ALL
6. BECOMING EASILY ANNOYED OR IRRITABLE: NOT AT ALL
3. WORRYING TOO MUCH ABOUT DIFFERENT THINGS: NOT AT ALL
4. TROUBLE RELAXING: NOT AT ALL

## 2024-04-11 ASSESSMENT — ENCOUNTER SYMPTOMS
STRIDOR: 0
NAUSEA: 0
EYE DISCHARGE: 0
ABDOMINAL PAIN: 0
EYE REDNESS: 0
DIARRHEA: 0
COUGH: 0
SORE THROAT: 0
SHORTNESS OF BREATH: 0
BLOOD IN STOOL: 0
BACK PAIN: 0
EYE PAIN: 0
PHOTOPHOBIA: 0
CONSTIPATION: 0
VOMITING: 0

## 2024-04-11 NOTE — PROGRESS NOTES
Subjective:      Patient ID: Kaye Danielle is a 68 y.o. female.    Patient here for follow up of her diabetes, Irritable bladder, Obesity, asthma as well as MNG.  States has appt with GI on [address] coming up. Not yet seen Dr Avendano or called   for appt to address her MNG and Thyrotoxicosis.  States fatigued and weak all the time .  Anxiety is a little better with toprol and zoloft  But states still has upper abdo pain- states still on asa- as recommended by her cardio- advised to   call cardio and see if they are ok with her stopping- [facility]- Dr Cruz  Spoke with Dr Murcia-He is ok with her d/c asa due to UGI sx- for now till eval by GI as patient continues to have severe GI sx- pepcid started as well.    Seeing GI in may for her upper abdo pain .  Will increase toprol to 50 mg as HR is still elevated- encouraged to call Dr Avendano to get appt - we called from office - so far no response  Addendum- spoke to DR Avendano- she will see patient and follow up on this  Review of Systems   Constitutional:  Positive for activity change and fatigue. Negative for chills and fever.   HENT:  Negative for congestion, ear pain, hearing loss, nosebleeds, sore throat and tinnitus.    Eyes:  Negative for photophobia, pain, discharge and redness.   Respiratory:  Negative for cough, shortness of breath and stridor.    Cardiovascular:  Negative for chest pain, palpitations and leg swelling.   Gastrointestinal:  Negative for abdominal pain, blood in stool, constipation, diarrhea, nausea and vomiting.   Endocrine: Negative for polydipsia.   Genitourinary:  Negative for dysuria, flank pain, frequency, hematuria and urgency.   Musculoskeletal:  Negative for back pain, myalgias and neck pain.   Skin:  Negative for rash.   Allergic/Immunologic: Negative for environmental allergies.   Neurological:  Positive for weakness. Negative for dizziness, tremors, seizures and headaches.   Hematological:  Does not bruise/bleed easily.

## 2024-04-11 NOTE — PATIENT INSTRUCTIONS
Thank you for choosing [facility].  We know you have options when it comes to your healthcare; we appreciate that you chose us. Our goal is to provide exceptional  service and world class care to every patient.  You will be receiving a survey via email or text message asking for your feedback.  Please take a few minutes to share your thoughts about your recent visit. Your comments helps us understand what we do well and ways we can improve.  Thank you in advance for your valuable feedback.

## 2024-05-15 DIAGNOSIS — I10 ESSENTIAL HYPERTENSION: ICD-10-CM

## 2024-05-15 RX ORDER — LOSARTAN POTASSIUM 100 MG/1
TABLET ORAL
Qty: 90 TABLET | Refills: 0 | Status: SHIPPED | OUTPATIENT
Start: 2024-05-15

## 2024-05-15 RX ORDER — HYDROCHLOROTHIAZIDE 12.5 MG/1
12.5 TABLET ORAL DAILY
Qty: 90 TABLET | Refills: 0 | Status: SHIPPED | OUTPATIENT
Start: 2024-05-15

## 2024-05-15 NOTE — TELEPHONE ENCOUNTER
I reduced dose of her HCTZ- will recommend she start the lower dose and see me next week for a recheck

## 2024-05-17 ENCOUNTER — OFFICE VISIT (OUTPATIENT)
Dept: GASTROENTEROLOGY | Age: 69
End: 2024-05-17
Payer: COMMERCIAL

## 2024-05-17 VITALS
SYSTOLIC BLOOD PRESSURE: 125 MMHG | TEMPERATURE: 97.9 F | HEART RATE: 81 BPM | DIASTOLIC BLOOD PRESSURE: 71 MMHG | HEIGHT: 64 IN | BODY MASS INDEX: 32.95 KG/M2 | WEIGHT: 193 LBS

## 2024-05-17 DIAGNOSIS — Z12.11 ENCOUNTER FOR SCREENING COLONOSCOPY: ICD-10-CM

## 2024-05-17 DIAGNOSIS — K21.9 GASTROESOPHAGEAL REFLUX DISEASE, UNSPECIFIED WHETHER ESOPHAGITIS PRESENT: Primary | ICD-10-CM

## 2024-05-17 PROCEDURE — 3074F SYST BP LT 130 MM HG: CPT | Performed by: INTERNAL MEDICINE

## 2024-05-17 PROCEDURE — G8399 PT W/DXA RESULTS DOCUMENT: HCPCS | Performed by: INTERNAL MEDICINE

## 2024-05-17 PROCEDURE — 1036F TOBACCO NON-USER: CPT | Performed by: INTERNAL MEDICINE

## 2024-05-17 PROCEDURE — G8417 CALC BMI ABV UP PARAM F/U: HCPCS | Performed by: INTERNAL MEDICINE

## 2024-05-17 PROCEDURE — 3078F DIAST BP <80 MM HG: CPT | Performed by: INTERNAL MEDICINE

## 2024-05-17 PROCEDURE — 3017F COLORECTAL CA SCREEN DOC REV: CPT | Performed by: INTERNAL MEDICINE

## 2024-05-17 PROCEDURE — 1090F PRES/ABSN URINE INCON ASSESS: CPT | Performed by: INTERNAL MEDICINE

## 2024-05-17 PROCEDURE — 1123F ACP DISCUSS/DSCN MKR DOCD: CPT | Performed by: INTERNAL MEDICINE

## 2024-05-17 PROCEDURE — 99204 OFFICE O/P NEW MOD 45 MIN: CPT | Performed by: INTERNAL MEDICINE

## 2024-05-17 PROCEDURE — G8427 DOCREV CUR MEDS BY ELIG CLIN: HCPCS | Performed by: INTERNAL MEDICINE

## 2024-05-17 RX ORDER — POLYETHYLENE GLYCOL 3350 17 G/17G
POWDER, FOR SOLUTION ORAL
Qty: 255 G | Refills: 0 | Status: SHIPPED | OUTPATIENT
Start: 2024-05-17

## 2024-05-17 NOTE — TELEPHONE ENCOUNTER
Procedure scheduled/Dr Estrada  Procedure:COLON/EGD  Dx:   Date:07/05/2024  Time:1245PM  Hospital:[facility] phone call:[facility]  Bowel Prep instructions given:MIRALAX/DULC  In office/via phone: IN OFFICE  Clearance needed:NO

## 2024-05-17 NOTE — PROGRESS NOTES
Reason for Referral:   Esperanza Mercado MD  [address], [address]  [place] [address]    Chief Complaint   Patient presents with    New Patient     Patient is a type 2 diabetic. She is not on any special diet. Patient is taking Pepcid which is helping some.    Diarrhea    Gas     X 1 month    Gastroesophageal Reflux           HISTORY OF PRESENT ILLNESS: Ms.Lillian Danielle is a 68 y.o. female with a pasthistory remarkable for CAD, referred for evaluation of reflux symptoms.  She is c/o- pain upper abdomen when she eats peppers or sausages. Also c/o- nausea. Symptoms are worse when she eats.  Also has constipation- she has no BM 2-3 days a week.  Also c/o- weight loss- she has lost 40 lbs over last 1 year. However, she has been diagnosed with thyrotoxicosis    No c/o- vomiting, fever, loss of appetite, bloating, bleeding KS, diarrhea, nocturnal diarrhea, tenesmus      Past Medical,Family, and Social History reviewed and does not contribute to the patient presentingcondition.     Jossue hermosillo's PMH/PSH,SH,PSYCH Hx, MEDs, ALLERGIES, and ROS were all reviewed and updated in the appropriate sections.    PAST MEDICAL HISTORY:  Past Medical History:   Diagnosis Date    Anxiety     Asthma     CAD (coronary artery disease)     Depression     Dizzy 11/27/2018    GERD (gastroesophageal reflux disease)     Hallux limitus, bilateral  11/02/2012    Headache 11/26/2023    Hyperlipidemia     Hypertension     MI (myocardial infarction) (HCC)     Mood disorder (HCC)     Obesity     Osteoarthritis     left knee    Other chest pain 11/26/2023    Sinus tarsi syndrome     Thyroid cyst     Type II or unspecified type diabetes mellitus without mention of complication, not stated as uncontrolled     Urinary incontinence     Vertigo        Past Surgical History:   Procedure Laterality Date    BREAST CYST EXCISION      HYSTERECTOMY (CERVIX STATUS UNKNOWN)         CURRENT MEDICATIONS:    Current Outpatient Medications:

## 2024-06-06 ENCOUNTER — TELEPHONE (OUTPATIENT)
Dept: FAMILY MEDICINE CLINIC | Age: 69
End: 2024-06-06

## 2024-06-06 ENCOUNTER — OFFICE VISIT (OUTPATIENT)
Dept: FAMILY MEDICINE CLINIC | Age: 69
End: 2024-06-06
Payer: COMMERCIAL

## 2024-06-06 VITALS
WEIGHT: 194.2 LBS | DIASTOLIC BLOOD PRESSURE: 80 MMHG | OXYGEN SATURATION: 98 % | TEMPERATURE: 97.3 F | SYSTOLIC BLOOD PRESSURE: 120 MMHG | HEART RATE: 92 BPM | BODY MASS INDEX: 33.16 KG/M2 | HEIGHT: 64 IN

## 2024-06-06 DIAGNOSIS — M25.562 CHRONIC PAIN OF BOTH KNEES: ICD-10-CM

## 2024-06-06 DIAGNOSIS — M25.561 CHRONIC PAIN OF BOTH KNEES: ICD-10-CM

## 2024-06-06 DIAGNOSIS — E66.9 OBESITY (BMI 30-39.9): ICD-10-CM

## 2024-06-06 DIAGNOSIS — I10 PRIMARY HYPERTENSION: ICD-10-CM

## 2024-06-06 DIAGNOSIS — E11.00 TYPE 2 DIABETES MELLITUS WITH HYPEROSMOLARITY WITHOUT COMA, WITHOUT LONG-TERM CURRENT USE OF INSULIN (HCC): ICD-10-CM

## 2024-06-06 DIAGNOSIS — E05.00 GOITER, TOXIC DIFFUSE: ICD-10-CM

## 2024-06-06 DIAGNOSIS — R32 URINARY INCONTINENCE, UNSPECIFIED TYPE: Primary | ICD-10-CM

## 2024-06-06 DIAGNOSIS — R32 URINARY INCONTINENCE IN FEMALE: ICD-10-CM

## 2024-06-06 DIAGNOSIS — G89.29 CHRONIC PAIN OF BOTH KNEES: ICD-10-CM

## 2024-06-06 PROBLEM — R42 DIZZINESS: Status: RESOLVED | Noted: 2018-11-27 | Resolved: 2024-06-06

## 2024-06-06 PROCEDURE — 1123F ACP DISCUSS/DSCN MKR DOCD: CPT | Performed by: FAMILY MEDICINE

## 2024-06-06 PROCEDURE — G8427 DOCREV CUR MEDS BY ELIG CLIN: HCPCS | Performed by: FAMILY MEDICINE

## 2024-06-06 PROCEDURE — 3074F SYST BP LT 130 MM HG: CPT | Performed by: FAMILY MEDICINE

## 2024-06-06 PROCEDURE — G8399 PT W/DXA RESULTS DOCUMENT: HCPCS | Performed by: FAMILY MEDICINE

## 2024-06-06 PROCEDURE — 3044F HG A1C LEVEL LT 7.0%: CPT | Performed by: FAMILY MEDICINE

## 2024-06-06 PROCEDURE — 99214 OFFICE O/P EST MOD 30 MIN: CPT | Performed by: FAMILY MEDICINE

## 2024-06-06 PROCEDURE — 0509F URINE INCON PLAN DOCD: CPT | Performed by: FAMILY MEDICINE

## 2024-06-06 PROCEDURE — 1036F TOBACCO NON-USER: CPT | Performed by: FAMILY MEDICINE

## 2024-06-06 PROCEDURE — G8417 CALC BMI ABV UP PARAM F/U: HCPCS | Performed by: FAMILY MEDICINE

## 2024-06-06 PROCEDURE — 3017F COLORECTAL CA SCREEN DOC REV: CPT | Performed by: FAMILY MEDICINE

## 2024-06-06 PROCEDURE — 1090F PRES/ABSN URINE INCON ASSESS: CPT | Performed by: FAMILY MEDICINE

## 2024-06-06 PROCEDURE — 3079F DIAST BP 80-89 MM HG: CPT | Performed by: FAMILY MEDICINE

## 2024-06-06 PROCEDURE — 2022F DILAT RTA XM EVC RTNOPTHY: CPT | Performed by: FAMILY MEDICINE

## 2024-06-06 ASSESSMENT — ANXIETY QUESTIONNAIRES
3. WORRYING TOO MUCH ABOUT DIFFERENT THINGS: NOT AT ALL
2. NOT BEING ABLE TO STOP OR CONTROL WORRYING: NOT AT ALL
IF YOU CHECKED OFF ANY PROBLEMS ON THIS QUESTIONNAIRE, HOW DIFFICULT HAVE THESE PROBLEMS MADE IT FOR YOU TO DO YOUR WORK, TAKE CARE OF THINGS AT HOME, OR GET ALONG WITH OTHER PEOPLE: NOT DIFFICULT AT ALL
7. FEELING AFRAID AS IF SOMETHING AWFUL MIGHT HAPPEN: NOT AT ALL
1. FEELING NERVOUS, ANXIOUS, OR ON EDGE: NOT AT ALL
5. BEING SO RESTLESS THAT IT IS HARD TO SIT STILL: NOT AT ALL
4. TROUBLE RELAXING: NOT AT ALL
GAD7 TOTAL SCORE: 0
6. BECOMING EASILY ANNOYED OR IRRITABLE: NOT AT ALL

## 2024-06-06 ASSESSMENT — PATIENT HEALTH QUESTIONNAIRE - PHQ9
SUM OF ALL RESPONSES TO PHQ QUESTIONS 1-9: 0
SUM OF ALL RESPONSES TO PHQ QUESTIONS 1-9: 0
1. LITTLE INTEREST OR PLEASURE IN DOING THINGS: NOT AT ALL
2. FEELING DOWN, DEPRESSED OR HOPELESS: NOT AT ALL
SUM OF ALL RESPONSES TO PHQ9 QUESTIONS 1 & 2: 0
1. LITTLE INTEREST OR PLEASURE IN DOING THINGS: SEVERAL DAYS
SUM OF ALL RESPONSES TO PHQ QUESTIONS 1-9: 2
SUM OF ALL RESPONSES TO PHQ QUESTIONS 1-9: 0
SUM OF ALL RESPONSES TO PHQ9 QUESTIONS 1 & 2: 2
SUM OF ALL RESPONSES TO PHQ QUESTIONS 1-9: 0
2. FEELING DOWN, DEPRESSED OR HOPELESS: SEVERAL DAYS
SUM OF ALL RESPONSES TO PHQ QUESTIONS 1-9: 2

## 2024-06-06 ASSESSMENT — ENCOUNTER SYMPTOMS
ABDOMINAL PAIN: 0
VOMITING: 0
EYE DISCHARGE: 0
SHORTNESS OF BREATH: 0
SORE THROAT: 0
PHOTOPHOBIA: 0
COUGH: 0
BACK PAIN: 0
CONSTIPATION: 0
EYE PAIN: 0
DIARRHEA: 0
NAUSEA: 0
BLOOD IN STOOL: 0
EYE REDNESS: 0
STRIDOR: 0

## 2024-06-06 NOTE — PROGRESS NOTES
Negative for hallucinations and suicidal ideas. The patient is not nervous/anxious.      Objective:   Physical Exam  Constitutional:       Appearance: She is well-developed. She is obese.   HENT:      Head: Normocephalic and atraumatic.      Right Ear: External ear normal.      Left Ear: External ear normal.      Nose: Nose normal.      Mouth/Throat:      Mouth: Mucous membranes are moist.      Pharynx: No oropharyngeal exudate.   Eyes:      General: No scleral icterus.        Right eye: No discharge.         Left eye: No discharge.      Conjunctiva/sclera: Conjunctivae normal.      Pupils: Pupils are equal, round, and reactive to light.   Neck:      Thyroid: No thyromegaly.      Vascular: No JVD.      Trachea: No tracheal deviation.   Cardiovascular:      Rate and Rhythm: Normal rate and regular rhythm.      Heart sounds: Normal heart sounds. No murmur heard.     No friction rub. No gallop.   Pulmonary:      Effort: Pulmonary effort is normal. No respiratory distress.      Breath sounds: Normal breath sounds. No wheezing or rales.   Chest:      Chest wall: No tenderness.   Abdominal:      General: Bowel sounds are normal. There is no distension.      Palpations: Abdomen is soft. There is no mass.      Tenderness: There is no abdominal tenderness. There is no guarding or rebound.   Musculoskeletal:         General: No tenderness. Normal range of motion.      Cervical back: Normal range of motion and neck supple.   Lymphadenopathy:      Cervical: No cervical adenopathy.   Skin:     General: Skin is warm and dry.      Findings: No rash.   Neurological:      General: No focal deficit present.      Mental Status: She is alert and oriented to person, place, and time. Mental status is at baseline.      Cranial Nerves: No cranial nerve deficit.      Coordination: Coordination normal.      Deep Tendon Reflexes: Reflexes are normal and symmetric.   Psychiatric:         Mood and Affect: Mood normal.         Behavior: Behavior

## 2024-06-06 NOTE — PATIENT INSTRUCTIONS
Thank you for choosing [facility].  We know you have options when it comes to your healthcare; we appreciate that you chose us. Our goal is to provide exceptional  service and world class care to every patient.  You will be receiving a survey via email or text message asking for your feedback.  Please take a few minutes to share your thoughts about your recent visit. Your comments helps us understand what we do well and ways we can improve.  Thank you in advance for your valuable feedback.

## 2024-06-07 RX ORDER — BISACODYL 5 MG/1
TABLET, DELAYED RELEASE ORAL
Qty: 4 TABLET | Refills: 0 | Status: SHIPPED | OUTPATIENT
Start: 2024-06-07

## 2024-06-07 RX ORDER — POLYETHYLENE GLYCOL 3350 17 G/17G
POWDER, FOR SOLUTION ORAL
Qty: 238 G | Refills: 0 | Status: SHIPPED | OUTPATIENT
Start: 2024-06-07

## 2024-06-21 ENCOUNTER — HOSPITAL ENCOUNTER (OUTPATIENT)
Dept: PREADMISSION TESTING | Age: 69
Discharge: HOME | End: 2024-06-25

## 2024-06-21 VITALS — WEIGHT: 200 LBS | HEIGHT: 64 IN | BODY MASS INDEX: 34.15 KG/M2

## 2024-06-21 NOTE — PROGRESS NOTES
Pre-op Instructions For Out-Patient Endoscopy Surgery    Medication Instructions:  Please stop herbs and any supplements now (includes vitamins and minerals).    Please contact your surgeon and prescribing physician for pre-op instructions for any blood thinners.    If you have inhalers/aerosol treatments at home, please use them the morning of your surgery and bring the inhalers with you to the hospital.    Please take the following medications the morning of your surgery with a sip of water:    metoprolol    Surgery Instructions:  After midnight before surgery:  Do not eat or drink anything, including water, mints, gum, and hard candy.  You may brush your teeth without swallowing.  No smoking, chewing tobacco, or street drugs.  Will complete prep by 0500 am, take meds, then NPO.    Please shower or bathe before surgery.       Please do not wear any cologne, lotion, powder, jewelry, piercings, perfume, makeup, nail polish, hair accessories, or hair spray on the day of surgery.  Wear loose comfortable clothing.    Leave your valuables at home but bring a payment source for any after-surgery prescriptions you plan to fill at [facility].  Bring a storage case for any glasses/contacts.    An adult who is responsible for you MUST drive you home and should be with you for the first 24 hours after surgery.     The Day of Surgery:  Arrive at [facility] Surgery Entr[facility] at the time directed by your surgeon and check in at the desk.     If you have a living will or healthcare power of , please bring a copy.    You will be taken to the pre-op holding area where you will be prepared for surgery.  A physical assessment will be performed by a nurse practitioner or house officer.  Your IV will be started and you will meet your anesthesiologist.    When you go to surgery, your family will be directed to the surgical waiting room, where the doctor should speak with them after your surgery.    After

## 2024-07-01 DIAGNOSIS — I10 PRIMARY HYPERTENSION: ICD-10-CM

## 2024-07-01 DIAGNOSIS — K21.9 GASTROESOPHAGEAL REFLUX DISEASE, UNSPECIFIED WHETHER ESOPHAGITIS PRESENT: ICD-10-CM

## 2024-07-02 RX ORDER — METOPROLOL SUCCINATE 50 MG/1
50 TABLET, EXTENDED RELEASE ORAL DAILY
Qty: 28 TABLET | Refills: 0 | OUTPATIENT
Start: 2024-07-02

## 2024-07-02 RX ORDER — FAMOTIDINE 20 MG/1
20 TABLET, FILM COATED ORAL 2 TIMES DAILY
Qty: 56 TABLET | Refills: 0 | OUTPATIENT
Start: 2024-07-02

## 2024-07-02 NOTE — PRE-PROCEDURE INSTRUCTIONS
Have you received your Prep? Any questions with prep instructions? Y  Only Clear Liquid Diet day before.Y  Nothing to eat after midnight day before procedure.  Are you taking any blood thinners? If so, you need to Stop.STOPPED  Remove any jewelry and body piercings.Y  Do you wear glasses? If so, please bring a case to store them in.  Are you having any Covid symptoms?N  Do you have any new rashes, infections, etc. that we should be aware of?N  Do you have a ride home the day of surgery? It cannot be a cab or medical transportation.Y  Verify surgery time/date and what time to arrive at hospital.1200

## 2024-07-03 ENCOUNTER — ANESTHESIA EVENT (OUTPATIENT)
Dept: ENDOSCOPY | Age: 69
End: 2024-07-03
Payer: COMMERCIAL

## 2024-07-05 ENCOUNTER — ANESTHESIA (OUTPATIENT)
Dept: ENDOSCOPY | Age: 69
End: 2024-07-05
Payer: COMMERCIAL

## 2024-07-05 ENCOUNTER — HOSPITAL ENCOUNTER (OUTPATIENT)
Age: 69
Setting detail: OUTPATIENT SURGERY
Discharge: HOME | End: 2024-07-05
Attending: INTERNAL MEDICINE | Admitting: INTERNAL MEDICINE
Payer: COMMERCIAL

## 2024-07-05 VITALS
RESPIRATION RATE: 19 BRPM | HEART RATE: 76 BPM | HEIGHT: 64 IN | DIASTOLIC BLOOD PRESSURE: 84 MMHG | BODY MASS INDEX: 34.15 KG/M2 | TEMPERATURE: 98 F | SYSTOLIC BLOOD PRESSURE: 161 MMHG | OXYGEN SATURATION: 97 % | WEIGHT: 200 LBS

## 2024-07-05 DIAGNOSIS — Z12.11 COLON CANCER SCREENING: ICD-10-CM

## 2024-07-05 DIAGNOSIS — K21.9 GASTROESOPHAGEAL REFLUX DISEASE, UNSPECIFIED WHETHER ESOPHAGITIS PRESENT: ICD-10-CM

## 2024-07-05 LAB — GLUCOSE BLD-MCNC: 99 MG/DL (ref 65–105)

## 2024-07-05 PROCEDURE — 3700000000 HC ANESTHESIA ATTENDED CARE: Performed by: INTERNAL MEDICINE

## 2024-07-05 PROCEDURE — 2709999900 HC NON-CHARGEABLE SUPPLY: Performed by: INTERNAL MEDICINE

## 2024-07-05 PROCEDURE — 82947 ASSAY GLUCOSE BLOOD QUANT: CPT

## 2024-07-05 PROCEDURE — 3700000001 HC ADD 15 MINUTES (ANESTHESIA): Performed by: INTERNAL MEDICINE

## 2024-07-05 PROCEDURE — 88305 TISSUE EXAM BY PATHOLOGIST: CPT

## 2024-07-05 PROCEDURE — 7100000010 HC PHASE II RECOVERY - FIRST 15 MIN: Performed by: INTERNAL MEDICINE

## 2024-07-05 PROCEDURE — 43239 EGD BIOPSY SINGLE/MULTIPLE: CPT | Performed by: INTERNAL MEDICINE

## 2024-07-05 PROCEDURE — G0121 COLON CA SCRN NOT HI RSK IND: HCPCS | Performed by: INTERNAL MEDICINE

## 2024-07-05 PROCEDURE — 7100000011 HC PHASE II RECOVERY - ADDTL 15 MIN: Performed by: INTERNAL MEDICINE

## 2024-07-05 PROCEDURE — 2580000003 HC RX 258: Performed by: ANESTHESIOLOGY

## 2024-07-05 PROCEDURE — 2500000003 HC RX 250 WO HCPCS: Performed by: NURSE ANESTHETIST, CERTIFIED REGISTERED

## 2024-07-05 PROCEDURE — 3609012400 HC EGD TRANSORAL BIOPSY SINGLE/MULTIPLE: Performed by: INTERNAL MEDICINE

## 2024-07-05 PROCEDURE — 3609027000 HC COLONOSCOPY: Performed by: INTERNAL MEDICINE

## 2024-07-05 PROCEDURE — 6360000002 HC RX W HCPCS: Performed by: NURSE ANESTHETIST, CERTIFIED REGISTERED

## 2024-07-05 RX ORDER — SODIUM CHLORIDE 9 MG/ML
INJECTION, SOLUTION INTRAVENOUS CONTINUOUS
Status: DISCONTINUED | OUTPATIENT
Start: 2024-07-05 | End: 2024-07-05 | Stop reason: HOSPADM

## 2024-07-05 RX ORDER — SODIUM CHLORIDE 0.9 % (FLUSH) 0.9 %
5-40 SYRINGE (ML) INJECTION PRN
Status: DISCONTINUED | OUTPATIENT
Start: 2024-07-05 | End: 2024-07-05 | Stop reason: HOSPADM

## 2024-07-05 RX ORDER — SODIUM CHLORIDE 9 MG/ML
INJECTION, SOLUTION INTRAVENOUS PRN
Status: DISCONTINUED | OUTPATIENT
Start: 2024-07-05 | End: 2024-07-05 | Stop reason: HOSPADM

## 2024-07-05 RX ORDER — SODIUM CHLORIDE 0.9 % (FLUSH) 0.9 %
5-40 SYRINGE (ML) INJECTION EVERY 12 HOURS SCHEDULED
Status: DISCONTINUED | OUTPATIENT
Start: 2024-07-05 | End: 2024-07-05 | Stop reason: HOSPADM

## 2024-07-05 RX ORDER — LIDOCAINE HYDROCHLORIDE 10 MG/ML
1 INJECTION, SOLUTION EPIDURAL; INFILTRATION; INTRACAUDAL; PERINEURAL
Status: DISCONTINUED | OUTPATIENT
Start: 2024-07-05 | End: 2024-07-05 | Stop reason: HOSPADM

## 2024-07-05 RX ORDER — LIDOCAINE HYDROCHLORIDE 20 MG/ML
INJECTION, SOLUTION INFILTRATION; PERINEURAL PRN
Status: DISCONTINUED | OUTPATIENT
Start: 2024-07-05 | End: 2024-07-05 | Stop reason: SDUPTHER

## 2024-07-05 RX ORDER — PROPOFOL 10 MG/ML
INJECTION, EMULSION INTRAVENOUS CONTINUOUS PRN
Status: DISCONTINUED | OUTPATIENT
Start: 2024-07-05 | End: 2024-07-05 | Stop reason: SDUPTHER

## 2024-07-05 RX ADMIN — PROPOFOL 100 MCG/KG/MIN: 10 INJECTION, EMULSION INTRAVENOUS at 13:38

## 2024-07-05 RX ADMIN — SODIUM CHLORIDE: 9 INJECTION, SOLUTION INTRAVENOUS at 12:56

## 2024-07-05 RX ADMIN — LIDOCAINE HYDROCHLORIDE 80 MG: 20 INJECTION, SOLUTION INFILTRATION; PERINEURAL at 13:38

## 2024-07-05 ASSESSMENT — PAIN - FUNCTIONAL ASSESSMENT
PAIN_FUNCTIONAL_ASSESSMENT: NONE - DENIES PAIN
PAIN_FUNCTIONAL_ASSESSMENT: 0-10

## 2024-07-05 ASSESSMENT — ENCOUNTER SYMPTOMS
BACK PAIN: 0
COUGH: 0
APNEA: 0
TROUBLE SWALLOWING: 0
SHORTNESS OF BREATH: 1
SORE THROAT: 0

## 2024-07-05 NOTE — ANESTHESIA POSTPROCEDURE EVALUATION
Department of Anesthesiology  Postprocedure Note    Patient: Kaye Danielle  MRN: [id number]  YOB: 1955  Date of evaluation: 7/5/2024    Procedure Summary       Date: 07/05/24 Room / Location: [place] [address] / [facility]    Anesthesia Start: 1332 Anesthesia Stop: 1404    Procedures:       ESOPHAGOGASTRODUODENOSCOPY BIOPSY (Esophagus)      COLONOSCOPY DIAGNOSTIC Diagnosis:       Gastroesophageal reflux disease, unspecified whether esophagitis present      Colon cancer screening      (Gastroesophageal reflux disease, unspecified whether esophagitis present [K21.9])      (Colon cancer screening [Z12.11])    Surgeons: Fadumo Estrada MD Responsible Provider: Sarai Hale MD    Anesthesia Type: General ASA Status: 3            Anesthesia Type: General    Rudy Phase I:      Rudy Phase II: Rudy Score: 10    Anesthesia Post Evaluation    Comments: POST- ANESTHESIA EVALUATION       Pt Name: Kaye Danielle  MRN: [id number]  YOB: 1955  Date of evaluation: 7/5/2024  Time:  2:26 PM      BP (!) 161/84   Pulse 76   Temp 98 °F (36.7 °C) (Infrared)   Resp 19   Ht 1.626 m (5' 4\")   Wt 90.7 kg (200 lb)   SpO2 97%   BMI 34.33 kg/m²      Consciousness Level  Awake  Cardiopulmonary Status  Stable  Pain Adequately Treated YES  Nausea / Vomiting  NO  Adequate Hydration  YES  Anesthesia Related Complications NONE      Electronically signed by Sarai Hale MD on 7/5/2024 at 2:26 PM               No notable events documented.  
Adequate: hears normal conversation without difficulty

## 2024-07-05 NOTE — H&P
release tablet Take 1 tablet by mouth daily Please dispense in bubble pack 90 tablet 0    famotidine (PEPCID) 20 MG tablet Take 1 tablet by mouth 2 times daily 180 tablet 0    sertraline (ZOLOFT) 50 MG tablet Take 1 tablet by mouth daily 90 tablet 1    amLODIPine (NORVASC) 5 MG tablet Take 1 tablet by mouth daily (Patient not taking: Reported on 6/21/2024) 30 tablet 3    loratadine (CLARITIN) 10 MG tablet TAKE 1 TABLET BY MOUTH ONE TIME A DAY 30 tablet 3    albuterol sulfate  (90 Base) MCG/ACT inhaler Inhale 2 puffs into the lungs every 6 hours as needed for Wheezing 1 Inhaler 3    blood glucose test strips (TRUE METRIX BLOOD GLUCOSE TEST) strip 1 each by In Vitro route 2 times daily As needed. 100 each 5    Alcohol Swabs (ALCOHOL WIPES) 70 % PADS Use up to 4 times a day when testing blood sugars 100 each 5    Lancets MISC 1 each by Does not apply route daily Use 4 times daily Diagnisis:250.0  Diabetes Mellitus 100 each 11       Review of Systems   Constitutional:  Positive for unexpected weight change (40 pounds in 2-3 months). Negative for appetite change, chills and fever.   HENT:  Positive for dental problem (missing top teeth) and hearing loss (hearing aids but not wearing today). Negative for sore throat and trouble swallowing.    Eyes:  Positive for visual disturbance (wears glasses).   Respiratory:  Positive for shortness of breath. Negative for apnea and cough.    Cardiovascular:  Positive for palpitations and leg swelling. Negative for chest pain.   Gastrointestinal:         See HPI   Genitourinary:  Negative for dysuria and hematuria.        Incontinent   Musculoskeletal:  Positive for arthralgias. Negative for back pain and neck pain.   Skin:  Positive for rash (yeast infection abdomen).   Neurological:  Positive for dizziness and numbness (right hand). Negative for seizures, speech difficulty and headaches.   Hematological:  Bruises/bleeds easily.   Psychiatric/Behavioral: Negative.            GENERAL PHYSICAL EXAM     Vitals: See nursing flowsheet for vital signs      GENERAL APPEARANCE:   Kaye Danielle is 68 y.o.,  female, , nourished, conscious, alert.  Does not appear to be distress or pain at this time.                            Physical Exam  Constitutional:       General: She is not in acute distress.     Appearance: Normal appearance.   HENT:      Head: Normocephalic.      Right Ear: External ear normal.      Left Ear: External ear normal.      Nose: Nose normal.      Mouth/Throat:      Mouth: Mucous membranes are dry.      Pharynx: Oropharynx is clear.   Eyes:      General:         Right eye: No discharge.         Left eye: No discharge.   Cardiovascular:      Rate and Rhythm: Normal rate and regular rhythm.      Heart sounds: Murmur heard.   Pulmonary:      Effort: Pulmonary effort is normal. No respiratory distress.      Breath sounds: Normal breath sounds. No wheezing, rhonchi or rales.   Abdominal:      General: Bowel sounds are normal. There is no distension.      Tenderness: There is abdominal tenderness. There is no guarding.   Musculoskeletal:         General: No swelling.      Cervical back: Normal range of motion.      Right lower leg: No edema.      Left lower leg: No edema.   Skin:     General: Skin is dry.   Neurological:      Mental Status: She is alert and oriented to person, place, and time.   Psychiatric:         Mood and Affect: Mood normal.         Behavior: Behavior normal.                   PROVISIONAL DIAGNOSES / SURGERY:      Gastroesophageal reflux disease, unspecified whether esophagitis present [K21.9]  Colon cancer screening [Z12.11]    ESOPHAGOGASTRODUODENOSCOPY BIOPSY, COLONOSCOPY DIAGNOSTIC     Patient Active Problem List    Diagnosis Date Noted    Hyperlipidemia with target LDL less than 100 03/22/2012    Type 2 diabetes mellitus with hyperosmolarity without coma, without long-term current use of insulin (HCC) 09/12/2011    HTN (hypertension) 09/12/2011

## 2024-07-05 NOTE — OP NOTE
PROCEDURE NOTE    DATE OF PROCEDURE: 7/5/2024     SURGEON: Fadumo Estrada MD  Facility: [facility]  ASSISTANT: None  Anesthesia: Monitored anesthesia care  PREOPERATIVE DIAGNOSIS: Upper abdominal pain; Altered bowel habits with weight loss    Diagnosis:    POSTOPERATIVE DIAGNOSIS: As described below    OPERATION: Upper GI endoscopy with biopsy    Colonoscopy     ANESTHESIA: Moderate Sedation     ESTIMATED BLOOD LOSS: Less than 50 ml    COMPLICATIONS: None.     SPECIMENS:  Was Obtained: gastric mucosa    HISTORY: The patient is a 68 y.o. year old female with history of above preop diagnosis.  I recommended esophagogastroduodenoscopy with possible biopsy and I explained the risk, benefits, expected outcome, and alternatives to the procedure.  Risks included but are not limited to bleeding, infection, respiratory distress, hypotension, and perforation of the esophagus, stomach, or duodenum.  Patient understands and is in agreement.      PROCEDURE: The patient was given IV conscious sedation.  The patient's SPO2 remained above 90% throughout the procedure.The gastroscope was inserted orally and advanced under direct vision through the esophagus, through the stomach, through the pylorus, and into the descending duodenum.      Post sedation note :The patient's SPO2 remained above 90% throughout the procedure.the vital signs remained stable , and no immediate complication form the procedure noted, patient will be ready for d/c when criteria is met .      Findings:    Retropharyngeal area was grossly normal appearing    Esophagus: normal; gastric inlet patch at 15 cm from incisors    Esophagogastric markings: Diaphragmatic hiatus- 40 cm; GE junction- 40 cm; Squamo-columnar junction- 40 cm    Stomach:    Fundus: normal    Body: normal    Antrum: normal  Biopsy obtained to r/o- H pylori infection     Duodenum:     Descending: normal    Bulb: normal      The scope was removed and the patient tolerated the procedure

## 2024-07-05 NOTE — DISCHARGE INSTRUCTIONS
to.  Follow-up care is a key part of your treatment and safety. Be sure to make and go to all appointments, and call your doctor if you are having problems. It's also a good idea to know your test results and keep a list of the medicines you take.    Call your Doctor if you have any of the following:             Passing blood rectally or vomiting blood (it may be red or black).      Persistent nausea or vomiting.      Severe abdominal or chest pain, not relieved by passing gas.      Fever of 100 or more, chills or excessive sweating.      Redness or swelling at the IV site.     If you experience shortness of breath or severe chest pain, call 911.           Where can you learn more?   Go to [email or web address] and sign in to your [employer] account. Enter E264 in the Search Health Information box to learn more about “Colonoscopy: What to Expect at Home.”    If you do not have an account, please click on the “Sign Up Now” link.     © 2099-9844 [employer]. Care instructions adapted under license by [employer]. This care instruction is for use with your licensed healthcare professional. If you have questions about a medical condition or this instruction, always ask your healthcare professional. [employer] disclaims any warranty or liability for your use of this information.  Content Version: 9.9.636190; Last Revised: February 20, 2013  EGD DISCHARGE INSTRUCTIONS    Activity:  Rest today. No driving, operating machinery, or making any important decisions today. May resume normal activity tomorrow.    Diet:  Following EGD eat slowly, chew food well, cut food into small pieces-Avoid greasy, spicy, \"crunchy\" foods X 48 hours.     Call your Doctor if you have any of the following:  -Passing blood rectally or vomiting blood (it may be red or black).  -Persistent nausea/vomiting  -Severe abdominal or chest pain not relieved with passing gas  -Fever of 100 degrees or

## 2024-07-08 DIAGNOSIS — I10 PRIMARY HYPERTENSION: ICD-10-CM

## 2024-07-08 DIAGNOSIS — N32.89 IRRITABLE BLADDER: ICD-10-CM

## 2024-07-08 DIAGNOSIS — K21.9 GASTROESOPHAGEAL REFLUX DISEASE, UNSPECIFIED WHETHER ESOPHAGITIS PRESENT: ICD-10-CM

## 2024-07-08 RX ORDER — FAMOTIDINE 20 MG/1
20 TABLET, FILM COATED ORAL NIGHTLY PRN
Qty: 28 TABLET | Refills: 1 | Status: SHIPPED | OUTPATIENT
Start: 2024-07-08

## 2024-07-08 RX ORDER — METOPROLOL SUCCINATE 50 MG/1
50 TABLET, EXTENDED RELEASE ORAL DAILY
Qty: 28 TABLET | Refills: 0 | Status: SHIPPED | OUTPATIENT
Start: 2024-07-08

## 2024-07-08 RX ORDER — OXYBUTYNIN CHLORIDE 10 MG/1
10 TABLET, EXTENDED RELEASE ORAL DAILY
Qty: 28 TABLET | Refills: 0 | Status: SHIPPED | OUTPATIENT
Start: 2024-07-08

## 2024-07-09 ENCOUNTER — HOSPITAL ENCOUNTER (OUTPATIENT)
Age: 69
Discharge: HOME | End: 2024-07-09
Payer: COMMERCIAL

## 2024-07-09 DIAGNOSIS — E11.00 TYPE 2 DIABETES MELLITUS WITH HYPEROSMOLARITY WITHOUT COMA, WITHOUT LONG-TERM CURRENT USE OF INSULIN (HCC): ICD-10-CM

## 2024-07-09 LAB
ANION GAP SERPL CALCULATED.3IONS-SCNC: 9 MMOL/L (ref 9–16)
BUN SERPL-MCNC: 11 MG/DL (ref 8–23)
CALCIUM SERPL-MCNC: 9.8 MG/DL (ref 8.6–10.4)
CHLORIDE SERPL-SCNC: 108 MMOL/L (ref 98–107)
CO2 SERPL-SCNC: 27 MMOL/L (ref 20–31)
CREAT SERPL-MCNC: 0.5 MG/DL (ref 0.5–0.9)
CREAT UR-MCNC: 56.5 MG/DL (ref 28–217)
EST. AVERAGE GLUCOSE BLD GHB EST-MCNC: 111 MG/DL
GFR, ESTIMATED: >90 ML/MIN/1.73M2
GLUCOSE SERPL-MCNC: 143 MG/DL (ref 74–99)
HBA1C MFR BLD: 5.5 % (ref 4–6)
MICROALBUMIN UR-MCNC: <12 MG/L (ref 0–20)
MICROALBUMIN/CREAT UR-RTO: NORMAL MCG/MG CREAT (ref 0–25)
POTASSIUM SERPL-SCNC: 3.7 MMOL/L (ref 3.7–5.3)
SODIUM SERPL-SCNC: 144 MMOL/L (ref 136–145)
SURGICAL PATHOLOGY REPORT: NORMAL

## 2024-07-09 PROCEDURE — 36415 COLL VENOUS BLD VENIPUNCTURE: CPT

## 2024-07-09 PROCEDURE — 83036 HEMOGLOBIN GLYCOSYLATED A1C: CPT

## 2024-07-09 PROCEDURE — 82570 ASSAY OF URINE CREATININE: CPT

## 2024-07-09 PROCEDURE — 82043 UR ALBUMIN QUANTITATIVE: CPT

## 2024-07-09 PROCEDURE — 80048 BASIC METABOLIC PNL TOTAL CA: CPT

## 2024-07-10 ENCOUNTER — OFFICE VISIT (OUTPATIENT)
Dept: FAMILY MEDICINE CLINIC | Age: 69
End: 2024-07-10
Payer: COMMERCIAL

## 2024-07-10 ENCOUNTER — HOSPITAL ENCOUNTER (OUTPATIENT)
Age: 69
Setting detail: SPECIMEN
Discharge: HOME | End: 2024-07-10

## 2024-07-10 VITALS
HEIGHT: 64 IN | WEIGHT: 192.4 LBS | TEMPERATURE: 97.7 F | DIASTOLIC BLOOD PRESSURE: 70 MMHG | SYSTOLIC BLOOD PRESSURE: 110 MMHG | OXYGEN SATURATION: 98 % | BODY MASS INDEX: 32.85 KG/M2 | HEART RATE: 71 BPM

## 2024-07-10 DIAGNOSIS — E04.2 MULTINODULAR GOITER (NONTOXIC): ICD-10-CM

## 2024-07-10 DIAGNOSIS — E55.9 VITAMIN D DEFICIENCY DISEASE: ICD-10-CM

## 2024-07-10 DIAGNOSIS — E05.00 GOITER, TOXIC DIFFUSE: ICD-10-CM

## 2024-07-10 DIAGNOSIS — F39 MOOD DISORDER (HCC): ICD-10-CM

## 2024-07-10 DIAGNOSIS — E11.00 TYPE 2 DIABETES MELLITUS WITH HYPEROSMOLARITY WITHOUT COMA, WITHOUT LONG-TERM CURRENT USE OF INSULIN (HCC): ICD-10-CM

## 2024-07-10 DIAGNOSIS — L08.9 INFECTED SKIN LESION: Primary | ICD-10-CM

## 2024-07-10 DIAGNOSIS — L08.9 INFECTED SKIN LESION: ICD-10-CM

## 2024-07-10 DIAGNOSIS — E66.9 OBESITY (BMI 30-39.9): ICD-10-CM

## 2024-07-10 DIAGNOSIS — I10 PRIMARY HYPERTENSION: ICD-10-CM

## 2024-07-10 DIAGNOSIS — R53.82 CHRONIC FATIGUE: ICD-10-CM

## 2024-07-10 PROBLEM — R07.9 CHEST PAIN: Status: RESOLVED | Noted: 2023-11-26 | Resolved: 2024-07-10

## 2024-07-10 PROBLEM — R07.9 CHEST PAIN: Status: ACTIVE | Noted: 2023-11-26

## 2024-07-10 PROCEDURE — 1123F ACP DISCUSS/DSCN MKR DOCD: CPT | Performed by: FAMILY MEDICINE

## 2024-07-10 PROCEDURE — 2022F DILAT RTA XM EVC RTNOPTHY: CPT | Performed by: FAMILY MEDICINE

## 2024-07-10 PROCEDURE — 1090F PRES/ABSN URINE INCON ASSESS: CPT | Performed by: FAMILY MEDICINE

## 2024-07-10 PROCEDURE — G8417 CALC BMI ABV UP PARAM F/U: HCPCS | Performed by: FAMILY MEDICINE

## 2024-07-10 PROCEDURE — 1036F TOBACCO NON-USER: CPT | Performed by: FAMILY MEDICINE

## 2024-07-10 PROCEDURE — 3078F DIAST BP <80 MM HG: CPT | Performed by: FAMILY MEDICINE

## 2024-07-10 PROCEDURE — G8399 PT W/DXA RESULTS DOCUMENT: HCPCS | Performed by: FAMILY MEDICINE

## 2024-07-10 PROCEDURE — 99214 OFFICE O/P EST MOD 30 MIN: CPT | Performed by: FAMILY MEDICINE

## 2024-07-10 PROCEDURE — 3074F SYST BP LT 130 MM HG: CPT | Performed by: FAMILY MEDICINE

## 2024-07-10 PROCEDURE — G8427 DOCREV CUR MEDS BY ELIG CLIN: HCPCS | Performed by: FAMILY MEDICINE

## 2024-07-10 PROCEDURE — 3044F HG A1C LEVEL LT 7.0%: CPT | Performed by: FAMILY MEDICINE

## 2024-07-10 PROCEDURE — 3017F COLORECTAL CA SCREEN DOC REV: CPT | Performed by: FAMILY MEDICINE

## 2024-07-10 RX ORDER — OMEPRAZOLE 20 MG/1
20 CAPSULE, DELAYED RELEASE ORAL
Qty: 90 CAPSULE | Refills: 1 | Status: SHIPPED | OUTPATIENT
Start: 2024-07-10

## 2024-07-10 RX ORDER — TETRACYCLINE HYDROCHLORIDE 500 MG/1
500 CAPSULE ORAL 4 TIMES DAILY
Qty: 40 CAPSULE | Refills: 0 | Status: SHIPPED | OUTPATIENT
Start: 2024-07-10 | End: 2024-07-20

## 2024-07-10 RX ORDER — METRONIDAZOLE 250 MG/1
250 TABLET ORAL 4 TIMES DAILY
Qty: 40 TABLET | Refills: 0 | Status: SHIPPED | OUTPATIENT
Start: 2024-07-10 | End: 2024-07-20

## 2024-07-10 ASSESSMENT — PATIENT HEALTH QUESTIONNAIRE - PHQ9
SUM OF ALL RESPONSES TO PHQ QUESTIONS 1-9: 1
SUM OF ALL RESPONSES TO PHQ QUESTIONS 1-9: 0
SUM OF ALL RESPONSES TO PHQ QUESTIONS 1-9: 1
2. FEELING DOWN, DEPRESSED OR HOPELESS: SEVERAL DAYS
SUM OF ALL RESPONSES TO PHQ9 QUESTIONS 1 & 2: 1
SUM OF ALL RESPONSES TO PHQ QUESTIONS 1-9: 0
SUM OF ALL RESPONSES TO PHQ QUESTIONS 1-9: 1
SUM OF ALL RESPONSES TO PHQ QUESTIONS 1-9: 1
1. LITTLE INTEREST OR PLEASURE IN DOING THINGS: NOT AT ALL
SUM OF ALL RESPONSES TO PHQ QUESTIONS 1-9: 0
SUM OF ALL RESPONSES TO PHQ QUESTIONS 1-9: 0
2. FEELING DOWN, DEPRESSED OR HOPELESS: NOT AT ALL
1. LITTLE INTEREST OR PLEASURE IN DOING THINGS: NOT AT ALL
SUM OF ALL RESPONSES TO PHQ9 QUESTIONS 1 & 2: 0

## 2024-07-10 ASSESSMENT — ANXIETY QUESTIONNAIRES
3. WORRYING TOO MUCH ABOUT DIFFERENT THINGS: NOT AT ALL
6. BECOMING EASILY ANNOYED OR IRRITABLE: NOT AT ALL
7. FEELING AFRAID AS IF SOMETHING AWFUL MIGHT HAPPEN: NOT AT ALL
4. TROUBLE RELAXING: NOT AT ALL
IF YOU CHECKED OFF ANY PROBLEMS ON THIS QUESTIONNAIRE, HOW DIFFICULT HAVE THESE PROBLEMS MADE IT FOR YOU TO DO YOUR WORK, TAKE CARE OF THINGS AT HOME, OR GET ALONG WITH OTHER PEOPLE: NOT DIFFICULT AT ALL
GAD7 TOTAL SCORE: 0
2. NOT BEING ABLE TO STOP OR CONTROL WORRYING: NOT AT ALL
1. FEELING NERVOUS, ANXIOUS, OR ON EDGE: NOT AT ALL
5. BEING SO RESTLESS THAT IT IS HARD TO SIT STILL: NOT AT ALL

## 2024-07-10 ASSESSMENT — ENCOUNTER SYMPTOMS
CONSTIPATION: 0
BLOOD IN STOOL: 0
BACK PAIN: 0
VOMITING: 0
NAUSEA: 0
STRIDOR: 0
EYE REDNESS: 0
EYE PAIN: 0
PHOTOPHOBIA: 0
SORE THROAT: 0
EYE DISCHARGE: 0
DIARRHEA: 0
ABDOMINAL PAIN: 0
SHORTNESS OF BREATH: 0
COUGH: 0

## 2024-07-10 NOTE — PROGRESS NOTES
provided  Vitals:    07/10/24 1317   BP: 110/70   Pulse:    Temp:    SpO2:          Assessment:      Diagnosis Orders   1. Infected skin lesion  Culture, Wound      2. Primary hypertension        3. Mood disorder (HCC)        4. Multinodular goiter (nontoxic)        5. Type 2 diabetes mellitus with hyperosmolarity without coma, without long-term current use of insulin (HCC)        6. Vitamin D deficiency disease        7. Goiter, toxic diffuse        8. Obesity (BMI 30-39.9)        9. Chronic fatigue               Plan:     Orders Placed This Encounter   Procedures    Culture, Wound       Outpatient Encounter Medications as of 7/10/2024   Medication Sig Dispense Refill    famotidine (PEPCID) 20 MG tablet Take 1 tablet by mouth nightly as needed (GERD) 28 tablet 1    metoprolol succinate (TOPROL XL) 50 MG extended release tablet TAKE 1 TABLET BY MOUTH DAILY PLEASE DISPENSE IN BUBBLE PACK 28 tablet 0    oxyBUTYnin (DITROPAN-XL) 10 MG extended release tablet TAKE 1 TABLET BY MOUTH DAILY PLEASE SEND ALL PRESCRIPTION PILLS IN BUBBLE PACK. 28 tablet 0    polyethylene glycol (GLYCOLAX) 17 GM/SCOOP powder Please follow instructions as given to you by your provider 238 g 0    bisacodyl 5 MG EC tablet Please follow instructions as given to you by your provider 4 tablet 0    diclofenac sodium (VOLTAREN) 1 % GEL Apply 4 g topically 4 times daily 350 g 1    losartan (COZAAR) 100 MG tablet TAKE 1 TABLET BY MOUTH ONCE DAILY 90 tablet 0    vitamin D (VITAMIN D3) 50 MCG (2000 UT) CAPS capsule Take 1 capsule by mouth daily Please give in bubble pack 90 capsule 5    metFORMIN (GLUCOPHAGE-XR) 500 MG extended release tablet Take 1 tablet by mouth daily (with breakfast) Please give in bubble pack 90 tablet 1    sertraline (ZOLOFT) 50 MG tablet Take 1 tablet by mouth daily 90 tablet 1    amLODIPine (NORVASC) 5 MG tablet Take 1 tablet by mouth daily 30 tablet 3    loratadine (CLARITIN) 10 MG tablet TAKE 1 TABLET BY MOUTH ONE TIME A DAY 30

## 2024-07-10 NOTE — PATIENT INSTRUCTIONS
Thank you for choosing [facility].  We know you have options when it comes to your healthcare; we appreciate that you chose us. Our goal is to provide exceptional  service and world class care to every patient.  You will be receiving a survey via email or text message asking for your feedback.  Please take a few minutes to share your thoughts about your recent visit. Your comments helps us understand what we do well and ways we can improve.  Thank you in advance for your valuable feedback.

## 2024-07-11 ENCOUNTER — TELEPHONE (OUTPATIENT)
Dept: GASTROENTEROLOGY | Age: 69
End: 2024-07-11

## 2024-07-11 NOTE — TELEPHONE ENCOUNTER
Med pharmacy called stating that pt will need PA for Tetracycline, minocycline 100mg can be given if PA is not able to be done in a timely manner.

## 2024-07-12 LAB
MICROORGANISM SPEC CULT: ABNORMAL
MICROORGANISM/AGENT SPEC: ABNORMAL
MICROORGANISM/AGENT SPEC: ABNORMAL
SPECIMEN DESCRIPTION: ABNORMAL

## 2024-07-15 NOTE — TELEPHONE ENCOUNTER
PA APPROVED THROUGH [facility]. LVM to patient to call the office back to receive positive h.pylori results and treatment (medications that were sent to her pharmacy).

## 2024-07-19 NOTE — TELEPHONE ENCOUNTER
Patient lvm stating to call pharmacy. I called and lvm to pharmacy x 2 to find out what else patient needs. Will try again later.

## 2024-07-25 DIAGNOSIS — I10 PRIMARY HYPERTENSION: ICD-10-CM

## 2024-07-25 DIAGNOSIS — N32.89 IRRITABLE BLADDER: ICD-10-CM

## 2024-07-25 DIAGNOSIS — I10 ESSENTIAL HYPERTENSION: ICD-10-CM

## 2024-07-25 RX ORDER — OXYBUTYNIN CHLORIDE 10 MG/1
10 TABLET, EXTENDED RELEASE ORAL DAILY
Qty: 28 TABLET | Refills: 3 | Status: SHIPPED | OUTPATIENT
Start: 2024-07-25

## 2024-07-25 RX ORDER — LOSARTAN POTASSIUM 100 MG/1
TABLET ORAL
Qty: 28 TABLET | Refills: 3 | Status: SHIPPED | OUTPATIENT
Start: 2024-07-25

## 2024-07-25 RX ORDER — METOPROLOL SUCCINATE 50 MG/1
50 TABLET, EXTENDED RELEASE ORAL DAILY
Qty: 28 TABLET | Refills: 3 | Status: SHIPPED | OUTPATIENT
Start: 2024-07-25

## 2024-08-08 ENCOUNTER — OFFICE VISIT (OUTPATIENT)
Dept: FAMILY MEDICINE CLINIC | Age: 69
End: 2024-08-08
Payer: COMMERCIAL

## 2024-08-08 VITALS
DIASTOLIC BLOOD PRESSURE: 70 MMHG | HEART RATE: 74 BPM | OXYGEN SATURATION: 99 % | HEIGHT: 64 IN | BODY MASS INDEX: 32.74 KG/M2 | SYSTOLIC BLOOD PRESSURE: 110 MMHG | WEIGHT: 191.8 LBS | TEMPERATURE: 97.3 F

## 2024-08-08 DIAGNOSIS — J30.2 SEASONAL ALLERGIES: ICD-10-CM

## 2024-08-08 DIAGNOSIS — E04.2 MULTINODULAR GOITER (NONTOXIC): ICD-10-CM

## 2024-08-08 DIAGNOSIS — F39 MOOD DISORDER (HCC): ICD-10-CM

## 2024-08-08 DIAGNOSIS — J30.9 ALLERGIC RHINITIS, UNSPECIFIED SEASONALITY, UNSPECIFIED TRIGGER: ICD-10-CM

## 2024-08-08 DIAGNOSIS — R42 VERTIGO: Primary | ICD-10-CM

## 2024-08-08 PROCEDURE — G8417 CALC BMI ABV UP PARAM F/U: HCPCS | Performed by: FAMILY MEDICINE

## 2024-08-08 PROCEDURE — 99213 OFFICE O/P EST LOW 20 MIN: CPT | Performed by: FAMILY MEDICINE

## 2024-08-08 PROCEDURE — 3074F SYST BP LT 130 MM HG: CPT | Performed by: FAMILY MEDICINE

## 2024-08-08 PROCEDURE — 1123F ACP DISCUSS/DSCN MKR DOCD: CPT | Performed by: FAMILY MEDICINE

## 2024-08-08 PROCEDURE — G8427 DOCREV CUR MEDS BY ELIG CLIN: HCPCS | Performed by: FAMILY MEDICINE

## 2024-08-08 PROCEDURE — 3017F COLORECTAL CA SCREEN DOC REV: CPT | Performed by: FAMILY MEDICINE

## 2024-08-08 PROCEDURE — 1036F TOBACCO NON-USER: CPT | Performed by: FAMILY MEDICINE

## 2024-08-08 PROCEDURE — 1090F PRES/ABSN URINE INCON ASSESS: CPT | Performed by: FAMILY MEDICINE

## 2024-08-08 PROCEDURE — 3078F DIAST BP <80 MM HG: CPT | Performed by: FAMILY MEDICINE

## 2024-08-08 PROCEDURE — G8399 PT W/DXA RESULTS DOCUMENT: HCPCS | Performed by: FAMILY MEDICINE

## 2024-08-08 RX ORDER — LORATADINE 10 MG/1
10 TABLET ORAL DAILY
Qty: 90 TABLET | Refills: 0 | Status: SHIPPED | OUTPATIENT
Start: 2024-08-08

## 2024-08-08 RX ORDER — FLUTICASONE PROPIONATE 50 MCG
2 SPRAY, SUSPENSION (ML) NASAL DAILY
Qty: 48 G | Refills: 1 | Status: SHIPPED | OUTPATIENT
Start: 2024-08-08

## 2024-08-08 ASSESSMENT — ENCOUNTER SYMPTOMS
COUGH: 0
NAUSEA: 0
VOMITING: 0
SORE THROAT: 0
PHOTOPHOBIA: 0
BLOOD IN STOOL: 0
CONSTIPATION: 0
RHINORRHEA: 1
EYE PAIN: 0
BACK PAIN: 0
EYE REDNESS: 0
DIARRHEA: 0
ABDOMINAL PAIN: 0
STRIDOR: 0
SHORTNESS OF BREATH: 0
EYE DISCHARGE: 0

## 2024-08-08 ASSESSMENT — ANXIETY QUESTIONNAIRES
5. BEING SO RESTLESS THAT IT IS HARD TO SIT STILL: NOT AT ALL
7. FEELING AFRAID AS IF SOMETHING AWFUL MIGHT HAPPEN: NOT AT ALL
2. NOT BEING ABLE TO STOP OR CONTROL WORRYING: NOT AT ALL
3. WORRYING TOO MUCH ABOUT DIFFERENT THINGS: NOT AT ALL
IF YOU CHECKED OFF ANY PROBLEMS ON THIS QUESTIONNAIRE, HOW DIFFICULT HAVE THESE PROBLEMS MADE IT FOR YOU TO DO YOUR WORK, TAKE CARE OF THINGS AT HOME, OR GET ALONG WITH OTHER PEOPLE: NOT DIFFICULT AT ALL
6. BECOMING EASILY ANNOYED OR IRRITABLE: NOT AT ALL
1. FEELING NERVOUS, ANXIOUS, OR ON EDGE: NOT AT ALL
GAD7 TOTAL SCORE: 0
4. TROUBLE RELAXING: NOT AT ALL

## 2024-08-08 ASSESSMENT — PATIENT HEALTH QUESTIONNAIRE - PHQ9
2. FEELING DOWN, DEPRESSED OR HOPELESS: SEVERAL DAYS
SUM OF ALL RESPONSES TO PHQ9 QUESTIONS 1 & 2: 2
SUM OF ALL RESPONSES TO PHQ QUESTIONS 1-9: 2
SUM OF ALL RESPONSES TO PHQ QUESTIONS 1-9: 0
SUM OF ALL RESPONSES TO PHQ QUESTIONS 1-9: 0
SUM OF ALL RESPONSES TO PHQ QUESTIONS 1-9: 2
SUM OF ALL RESPONSES TO PHQ QUESTIONS 1-9: 0
1. LITTLE INTEREST OR PLEASURE IN DOING THINGS: NOT AT ALL
SUM OF ALL RESPONSES TO PHQ QUESTIONS 1-9: 0
SUM OF ALL RESPONSES TO PHQ9 QUESTIONS 1 & 2: 0
SUM OF ALL RESPONSES TO PHQ QUESTIONS 1-9: 2
2. FEELING DOWN, DEPRESSED OR HOPELESS: NOT AT ALL
SUM OF ALL RESPONSES TO PHQ QUESTIONS 1-9: 2
1. LITTLE INTEREST OR PLEASURE IN DOING THINGS: SEVERAL DAYS

## 2024-08-08 NOTE — PROGRESS NOTES
Subjective:      Patient ID: Kaye Danielle is a 68 y.o. female.    STATES  started with sinus congestion yesterday- blowing nose and PND Clear watery drainage  H/O recurrent sx - similar to this also a couple of days of vertigo and loss of balance- H/O similar sx on andoff.  States mood, anxiety and sleep ok. No fever or chills. States she gets stomach upset with most antibiotics.  Did not yet see the ENDO for her hyperthyroidism as she missed last appt and was rescheduled for October  Seen GI - EGD and colonoscopy per GI was ok- has a follow up to discuss biopsy.    Review of Systems   Constitutional:  Negative for chills and fever.   HENT:  Positive for congestion, postnasal drip and rhinorrhea. Negative for ear pain, hearing loss, nosebleeds, sore throat and tinnitus.    Eyes:  Negative for photophobia, pain, discharge and redness.   Respiratory:  Negative for cough, shortness of breath and stridor.    Cardiovascular:  Negative for chest pain, palpitations and leg swelling.   Gastrointestinal:  Negative for abdominal pain, blood in stool, constipation, diarrhea, nausea and vomiting.   Endocrine: Negative for polydipsia.   Genitourinary:  Negative for dysuria, flank pain, frequency, hematuria and urgency.   Musculoskeletal:  Negative for back pain, myalgias and neck pain.   Skin:  Negative for rash.   Allergic/Immunologic: Negative for environmental allergies.   Neurological:  Negative for dizziness, tremors, seizures, weakness and headaches.   Hematological:  Does not bruise/bleed easily.   Psychiatric/Behavioral:  Negative for hallucinations and suicidal ideas. The patient is not nervous/anxious.      Objective:   Physical Exam  Constitutional:       Appearance: She is well-developed.   HENT:      Head: Normocephalic and atraumatic.      Right Ear: External ear normal.      Left Ear: External ear normal.      Nose: Nose normal.      Mouth/Throat:      Pharynx: No oropharyngeal exudate.   Eyes:      General: No

## 2024-08-08 NOTE — PATIENT INSTRUCTIONS
Thank you for choosing [facility].  We know you have options when it comes to your healthcare; we appreciate that you chose us. Our goal is to provide exceptional  service and world class care to every patient.  You will be receiving a survey via email or text message asking for your feedback.  Please take a few minutes to share your thoughts about your recent visit. Your comments helps us understand what we do well and ways we can improve.  Thank you in advance for your valuable feedback.

## 2024-08-09 ENCOUNTER — TELEPHONE (OUTPATIENT)
Dept: ADMINISTRATIVE | Age: 69
End: 2024-08-09

## 2024-08-19 DIAGNOSIS — K21.9 GASTROESOPHAGEAL REFLUX DISEASE, UNSPECIFIED WHETHER ESOPHAGITIS PRESENT: ICD-10-CM

## 2024-08-19 RX ORDER — FAMOTIDINE 20 MG/1
20 TABLET, FILM COATED ORAL NIGHTLY PRN
Qty: 28 TABLET | Refills: 1 | Status: SHIPPED | OUTPATIENT
Start: 2024-08-19

## 2024-09-19 DIAGNOSIS — E11.00 TYPE 2 DIABETES MELLITUS WITH HYPEROSMOLARITY WITHOUT COMA, WITHOUT LONG-TERM CURRENT USE OF INSULIN (HCC): ICD-10-CM

## 2024-09-19 RX ORDER — METFORMIN HCL 500 MG
500 TABLET, EXTENDED RELEASE 24 HR ORAL
Qty: 28 TABLET | Refills: 1 | Status: SHIPPED | OUTPATIENT
Start: 2024-09-19

## 2024-10-15 DIAGNOSIS — K21.9 GASTROESOPHAGEAL REFLUX DISEASE, UNSPECIFIED WHETHER ESOPHAGITIS PRESENT: ICD-10-CM

## 2024-10-15 DIAGNOSIS — Z12.11 ENCOUNTER FOR SCREENING COLONOSCOPY: ICD-10-CM

## 2024-10-17 ENCOUNTER — OFFICE VISIT (OUTPATIENT)
Dept: FAMILY MEDICINE CLINIC | Age: 69
End: 2024-10-17

## 2024-10-17 VITALS
SYSTOLIC BLOOD PRESSURE: 118 MMHG | DIASTOLIC BLOOD PRESSURE: 78 MMHG | TEMPERATURE: 97.1 F | WEIGHT: 192 LBS | OXYGEN SATURATION: 97 % | BODY MASS INDEX: 32.78 KG/M2 | HEIGHT: 64 IN

## 2024-10-17 DIAGNOSIS — R60.0 EDEMA OF RIGHT LOWER EXTREMITY: ICD-10-CM

## 2024-10-17 DIAGNOSIS — Z12.31 SCREENING MAMMOGRAM FOR BREAST CANCER: Primary | ICD-10-CM

## 2024-10-17 DIAGNOSIS — K21.9 GASTROESOPHAGEAL REFLUX DISEASE, UNSPECIFIED WHETHER ESOPHAGITIS PRESENT: ICD-10-CM

## 2024-10-17 DIAGNOSIS — E05.00 GOITER, TOXIC DIFFUSE: ICD-10-CM

## 2024-10-17 DIAGNOSIS — J30.2 SEASONAL ALLERGIES: ICD-10-CM

## 2024-10-17 DIAGNOSIS — J45.21 MILD INTERMITTENT ASTHMA WITH EXACERBATION: ICD-10-CM

## 2024-10-17 DIAGNOSIS — F39 MOOD DISORDER (HCC): ICD-10-CM

## 2024-10-17 DIAGNOSIS — J45.20 MILD INTERMITTENT ASTHMA WITHOUT COMPLICATION: ICD-10-CM

## 2024-10-17 DIAGNOSIS — N32.89 IRRITABLE BLADDER: ICD-10-CM

## 2024-10-17 DIAGNOSIS — E66.811 CLASS 1 OBESITY WITH BODY MASS INDEX (BMI) OF 32.0 TO 32.9 IN ADULT, UNSPECIFIED OBESITY TYPE, UNSPECIFIED WHETHER SERIOUS COMORBIDITY PRESENT: ICD-10-CM

## 2024-10-17 DIAGNOSIS — J20.9 ACUTE TRACHEOBRONCHITIS: ICD-10-CM

## 2024-10-17 DIAGNOSIS — E11.00 TYPE 2 DIABETES MELLITUS WITH HYPEROSMOLARITY WITHOUT COMA, WITHOUT LONG-TERM CURRENT USE OF INSULIN (HCC): ICD-10-CM

## 2024-10-17 DIAGNOSIS — I10 ESSENTIAL HYPERTENSION: ICD-10-CM

## 2024-10-17 PROBLEM — N39.41 URGE INCONTINENCE: Status: ACTIVE | Noted: 2024-10-17

## 2024-10-17 RX ORDER — PREDNISONE 5 MG/1
5 TABLET ORAL 2 TIMES DAILY
Qty: 10 TABLET | Refills: 0 | Status: SHIPPED | OUTPATIENT
Start: 2024-10-17 | End: 2024-10-22

## 2024-10-17 RX ORDER — FAMOTIDINE 20 MG/1
20 TABLET, FILM COATED ORAL NIGHTLY PRN
Qty: 28 TABLET | Refills: 1 | OUTPATIENT
Start: 2024-10-17

## 2024-10-17 RX ORDER — MIRABEGRON 50 MG/1
1 TABLET, EXTENDED RELEASE ORAL
COMMUNITY
Start: 2024-09-23 | End: 2024-10-23

## 2024-10-17 RX ORDER — BUDESONIDE AND FORMOTEROL FUMARATE DIHYDRATE 160; 4.5 UG/1; UG/1
2 AEROSOL RESPIRATORY (INHALATION) 2 TIMES DAILY
Qty: 10.2 G | Refills: 3 | Status: SHIPPED | OUTPATIENT
Start: 2024-10-17

## 2024-10-17 RX ORDER — LORATADINE 10 MG/1
10 TABLET ORAL DAILY
Qty: 28 TABLET | Refills: 0 | Status: SHIPPED | OUTPATIENT
Start: 2024-10-17

## 2024-10-17 ASSESSMENT — PATIENT HEALTH QUESTIONNAIRE - PHQ9
SUM OF ALL RESPONSES TO PHQ QUESTIONS 1-9: 4
9. THOUGHTS THAT YOU WOULD BE BETTER OFF DEAD, OR OF HURTING YOURSELF: NOT AT ALL
8. MOVING OR SPEAKING SO SLOWLY THAT OTHER PEOPLE COULD HAVE NOTICED. OR THE OPPOSITE, BEING SO FIGETY OR RESTLESS THAT YOU HAVE BEEN MOVING AROUND A LOT MORE THAN USUAL: NOT AT ALL
SUM OF ALL RESPONSES TO PHQ QUESTIONS 1-9: 4
9. THOUGHTS THAT YOU WOULD BE BETTER OFF DEAD, OR OF HURTING YOURSELF: NOT AT ALL
3. TROUBLE FALLING OR STAYING ASLEEP: SEVERAL DAYS
SUM OF ALL RESPONSES TO PHQ9 QUESTIONS 1 & 2: 1
SUM OF ALL RESPONSES TO PHQ QUESTIONS 1-9: 4
SUM OF ALL RESPONSES TO PHQ QUESTIONS 1-9: 4
3. TROUBLE FALLING OR STAYING ASLEEP: NOT AT ALL
6. FEELING BAD ABOUT YOURSELF - OR THAT YOU ARE A FAILURE OR HAVE LET YOURSELF OR YOUR FAMILY DOWN: NOT AT ALL
10. IF YOU CHECKED OFF ANY PROBLEMS, HOW DIFFICULT HAVE THESE PROBLEMS MADE IT FOR YOU TO DO YOUR WORK, TAKE CARE OF THINGS AT HOME, OR GET ALONG WITH OTHER PEOPLE: SOMEWHAT DIFFICULT
10. IF YOU CHECKED OFF ANY PROBLEMS, HOW DIFFICULT HAVE THESE PROBLEMS MADE IT FOR YOU TO DO YOUR WORK, TAKE CARE OF THINGS AT HOME, OR GET ALONG WITH OTHER PEOPLE: SOMEWHAT DIFFICULT
1. LITTLE INTEREST OR PLEASURE IN DOING THINGS: NOT AT ALL
2. FEELING DOWN, DEPRESSED OR HOPELESS: NOT AT ALL
5. POOR APPETITE OR OVEREATING: SEVERAL DAYS
SUM OF ALL RESPONSES TO PHQ QUESTIONS 1-9: 4
SUM OF ALL RESPONSES TO PHQ9 QUESTIONS 1 & 2: 0
5. POOR APPETITE OR OVEREATING: SEVERAL DAYS
7. TROUBLE CONCENTRATING ON THINGS, SUCH AS READING THE NEWSPAPER OR WATCHING TELEVISION: SEVERAL DAYS
4. FEELING TIRED OR HAVING LITTLE ENERGY: SEVERAL DAYS
SUM OF ALL RESPONSES TO PHQ QUESTIONS 1-9: 4
2. FEELING DOWN, DEPRESSED OR HOPELESS: SEVERAL DAYS
7. TROUBLE CONCENTRATING ON THINGS, SUCH AS READING THE NEWSPAPER OR WATCHING TELEVISION: SEVERAL DAYS
1. LITTLE INTEREST OR PLEASURE IN DOING THINGS: NOT AT ALL
6. FEELING BAD ABOUT YOURSELF - OR THAT YOU ARE A FAILURE OR HAVE LET YOURSELF OR YOUR FAMILY DOWN: NOT AT ALL
4. FEELING TIRED OR HAVING LITTLE ENERGY: SEVERAL DAYS
SUM OF ALL RESPONSES TO PHQ QUESTIONS 1-9: 4
SUM OF ALL RESPONSES TO PHQ QUESTIONS 1-9: 4
8. MOVING OR SPEAKING SO SLOWLY THAT OTHER PEOPLE COULD HAVE NOTICED. OR THE OPPOSITE, BEING SO FIGETY OR RESTLESS THAT YOU HAVE BEEN MOVING AROUND A LOT MORE THAN USUAL: NOT AT ALL

## 2024-10-17 ASSESSMENT — ENCOUNTER SYMPTOMS
CONSTIPATION: 0
STRIDOR: 0
EYE PAIN: 0
NAUSEA: 0
EYE REDNESS: 0
EYE DISCHARGE: 0
PHOTOPHOBIA: 0
BLOOD IN STOOL: 0
BACK PAIN: 0
DIARRHEA: 0
COUGH: 0
VOMITING: 0
SORE THROAT: 0
SHORTNESS OF BREATH: 0
ABDOMINAL PAIN: 0

## 2024-10-17 ASSESSMENT — ANXIETY QUESTIONNAIRES
1. FEELING NERVOUS, ANXIOUS, OR ON EDGE: NOT AT ALL
GAD7 TOTAL SCORE: 0
5. BEING SO RESTLESS THAT IT IS HARD TO SIT STILL: NOT AT ALL
3. WORRYING TOO MUCH ABOUT DIFFERENT THINGS: NOT AT ALL
2. NOT BEING ABLE TO STOP OR CONTROL WORRYING: NOT AT ALL
6. BECOMING EASILY ANNOYED OR IRRITABLE: NOT AT ALL
IF YOU CHECKED OFF ANY PROBLEMS ON THIS QUESTIONNAIRE, HOW DIFFICULT HAVE THESE PROBLEMS MADE IT FOR YOU TO DO YOUR WORK, TAKE CARE OF THINGS AT HOME, OR GET ALONG WITH OTHER PEOPLE: NOT DIFFICULT AT ALL
7. FEELING AFRAID AS IF SOMETHING AWFUL MIGHT HAPPEN: NOT AT ALL
4. TROUBLE RELAXING: NOT AT ALL

## 2024-10-17 NOTE — PROGRESS NOTES
Subjective:      Patient ID: Kaye Danielle is a 68 y.o. female.    Here for follow up of diabetes,HTN Obesity and GERD as well as new sx of runny nose   and cough x 1 week.  Cough productive at times, clear nasal drainage, not sure of color of sputum. No fever or chills   or SOB or CP. Appetite has been ok. No diff with bowels - takes a med for her Irritable bladder .  BP is under control with current meds  Also was taking  ditropan for irritable bladder. Saw urology and was prescribed Myrbetriq-   not taking ditropan per patient.Will see them for follow up soon.  Called Dr Estrada for GERD meds. Not yet got back.  Takes pepcid as well as Prilosec 20 mg BID per patient.  Sees Dr Avendano tomorrow for follow up of MNG  Review of Systems   Constitutional:  Negative for chills and fever.   HENT:  Negative for congestion, ear pain, hearing loss, nosebleeds, sore throat and tinnitus.    Eyes:  Negative for photophobia, pain, discharge and redness.   Respiratory:  Negative for cough, shortness of breath and stridor.    Cardiovascular:  Negative for chest pain, palpitations and leg swelling.   Gastrointestinal:  Negative for abdominal pain, blood in stool, constipation, diarrhea, nausea and vomiting.   Endocrine: Negative for polydipsia.   Genitourinary:  Negative for dysuria, flank pain, frequency, hematuria and urgency.   Musculoskeletal:  Negative for back pain, myalgias and neck pain.   Skin:  Negative for rash.   Allergic/Immunologic: Negative for environmental allergies.   Neurological:  Negative for dizziness, tremors, seizures, weakness and headaches.   Hematological:  Does not bruise/bleed easily.   Psychiatric/Behavioral:  Negative for hallucinations and suicidal ideas. The patient is not nervous/anxious.      Objective:   Physical Exam  Constitutional:       Appearance: She is well-developed.   HENT:      Head: Normocephalic and atraumatic.      Right Ear: External ear normal.      Left Ear: External ear normal.

## 2024-10-23 DIAGNOSIS — K21.9 GASTROESOPHAGEAL REFLUX DISEASE, UNSPECIFIED WHETHER ESOPHAGITIS PRESENT: ICD-10-CM

## 2024-10-23 RX ORDER — FAMOTIDINE 20 MG/1
20 TABLET, FILM COATED ORAL NIGHTLY PRN
Qty: 28 TABLET | Refills: 1 | OUTPATIENT
Start: 2024-10-23

## 2024-10-29 DIAGNOSIS — K21.9 GASTROESOPHAGEAL REFLUX DISEASE, UNSPECIFIED WHETHER ESOPHAGITIS PRESENT: ICD-10-CM

## 2024-10-29 RX ORDER — FAMOTIDINE 20 MG/1
20 TABLET, FILM COATED ORAL NIGHTLY PRN
Qty: 28 TABLET | Refills: 1 | OUTPATIENT
Start: 2024-10-29

## 2024-10-30 DIAGNOSIS — M25.561 CHRONIC PAIN OF BOTH KNEES: ICD-10-CM

## 2024-10-30 DIAGNOSIS — M25.562 CHRONIC PAIN OF BOTH KNEES: ICD-10-CM

## 2024-10-30 DIAGNOSIS — G89.29 CHRONIC PAIN OF BOTH KNEES: ICD-10-CM

## 2024-11-12 DIAGNOSIS — I10 ESSENTIAL HYPERTENSION: ICD-10-CM

## 2024-11-12 DIAGNOSIS — N32.89 IRRITABLE BLADDER: ICD-10-CM

## 2024-11-12 DIAGNOSIS — J30.2 SEASONAL ALLERGIES: ICD-10-CM

## 2024-11-12 DIAGNOSIS — I10 PRIMARY HYPERTENSION: ICD-10-CM

## 2024-11-12 RX ORDER — LORATADINE 10 MG/1
10 TABLET ORAL DAILY
Qty: 28 TABLET | Refills: 0 | Status: SHIPPED | OUTPATIENT
Start: 2024-11-12

## 2024-11-12 RX ORDER — OXYBUTYNIN CHLORIDE 10 MG/1
10 TABLET, EXTENDED RELEASE ORAL DAILY
Qty: 28 TABLET | Refills: 3 | Status: SHIPPED | OUTPATIENT
Start: 2024-11-12

## 2024-11-12 RX ORDER — METOPROLOL SUCCINATE 50 MG/1
50 TABLET, EXTENDED RELEASE ORAL DAILY
Qty: 28 TABLET | Refills: 3 | Status: SHIPPED | OUTPATIENT
Start: 2024-11-12

## 2024-11-12 RX ORDER — LOSARTAN POTASSIUM 100 MG/1
TABLET ORAL
Qty: 28 TABLET | Refills: 3 | Status: SHIPPED | OUTPATIENT
Start: 2024-11-12

## 2024-12-09 DIAGNOSIS — J30.2 SEASONAL ALLERGIES: ICD-10-CM

## 2024-12-09 RX ORDER — LORATADINE 10 MG/1
10 TABLET ORAL DAILY
Qty: 28 TABLET | Refills: 0 | Status: SHIPPED | OUTPATIENT
Start: 2024-12-09

## 2024-12-21 DIAGNOSIS — K21.9 GASTROESOPHAGEAL REFLUX DISEASE, UNSPECIFIED WHETHER ESOPHAGITIS PRESENT: ICD-10-CM

## 2024-12-21 RX ORDER — FAMOTIDINE 20 MG/1
20 TABLET, FILM COATED ORAL NIGHTLY PRN
Qty: 28 TABLET | Refills: 1 | Status: SHIPPED | OUTPATIENT
Start: 2024-12-21

## 2024-12-30 DIAGNOSIS — J30.2 SEASONAL ALLERGIES: ICD-10-CM

## 2024-12-30 RX ORDER — FLUTICASONE PROPIONATE 50 MCG
SPRAY, SUSPENSION (ML) NASAL
Qty: 16 G | Refills: 1 | Status: SHIPPED | OUTPATIENT
Start: 2024-12-30

## 2025-01-04 DIAGNOSIS — J30.2 SEASONAL ALLERGIES: ICD-10-CM

## 2025-01-04 RX ORDER — LORATADINE 10 MG/1
10 TABLET ORAL DAILY
Qty: 28 TABLET | Refills: 0 | Status: SHIPPED | OUTPATIENT
Start: 2025-01-04

## 2025-02-06 DIAGNOSIS — K21.9 GASTROESOPHAGEAL REFLUX DISEASE, UNSPECIFIED WHETHER ESOPHAGITIS PRESENT: ICD-10-CM

## 2025-02-06 DIAGNOSIS — J30.2 SEASONAL ALLERGIES: ICD-10-CM

## 2025-02-06 RX ORDER — LORATADINE 10 MG/1
10 TABLET ORAL DAILY
Qty: 28 TABLET | Refills: 0 | Status: SHIPPED | OUTPATIENT
Start: 2025-02-06

## 2025-02-06 RX ORDER — FAMOTIDINE 20 MG/1
20 TABLET, FILM COATED ORAL NIGHTLY PRN
Qty: 28 TABLET | Refills: 1 | Status: SHIPPED | OUTPATIENT
Start: 2025-02-06

## 2025-02-11 DIAGNOSIS — J30.2 SEASONAL ALLERGIES: ICD-10-CM

## 2025-02-11 RX ORDER — FLUTICASONE PROPIONATE 50 MCG
SPRAY, SUSPENSION (ML) NASAL
Qty: 16 G | Refills: 1 | Status: SHIPPED | OUTPATIENT
Start: 2025-02-11

## 2025-03-07 DIAGNOSIS — N32.89 IRRITABLE BLADDER: ICD-10-CM

## 2025-03-07 DIAGNOSIS — J30.2 SEASONAL ALLERGIES: ICD-10-CM

## 2025-03-07 DIAGNOSIS — I10 PRIMARY HYPERTENSION: ICD-10-CM

## 2025-03-07 DIAGNOSIS — I10 ESSENTIAL HYPERTENSION: ICD-10-CM

## 2025-03-07 RX ORDER — OXYBUTYNIN CHLORIDE 10 MG/1
10 TABLET, EXTENDED RELEASE ORAL DAILY
Qty: 30 TABLET | Refills: 0 | Status: SHIPPED | OUTPATIENT
Start: 2025-03-07 | End: 2025-04-06

## 2025-03-07 RX ORDER — LOSARTAN POTASSIUM 100 MG/1
TABLET ORAL
Qty: 28 TABLET | Refills: 3 | Status: SHIPPED | OUTPATIENT
Start: 2025-03-07 | End: 2025-04-06

## 2025-03-07 RX ORDER — METOPROLOL SUCCINATE 50 MG/1
50 TABLET, EXTENDED RELEASE ORAL DAILY
Qty: 30 TABLET | Refills: 0 | Status: SHIPPED | OUTPATIENT
Start: 2025-03-07 | End: 2025-04-06

## 2025-03-07 RX ORDER — LORATADINE 10 MG/1
10 TABLET ORAL DAILY
Qty: 28 TABLET | Refills: 0 | OUTPATIENT
Start: 2025-03-07

## 2025-03-10 DIAGNOSIS — J30.2 SEASONAL ALLERGIES: ICD-10-CM

## 2025-03-10 RX ORDER — LORATADINE 10 MG/1
10 TABLET ORAL DAILY
Qty: 28 TABLET | Refills: 0 | OUTPATIENT
Start: 2025-03-10

## 2025-04-02 DIAGNOSIS — E55.9 VITAMIN D DEFICIENCY DISEASE: ICD-10-CM

## 2025-04-02 DIAGNOSIS — I10 PRIMARY HYPERTENSION: ICD-10-CM

## 2025-04-02 DIAGNOSIS — K21.9 GASTROESOPHAGEAL REFLUX DISEASE, UNSPECIFIED WHETHER ESOPHAGITIS PRESENT: ICD-10-CM

## 2025-04-02 DIAGNOSIS — J30.2 SEASONAL ALLERGIES: ICD-10-CM

## 2025-04-02 DIAGNOSIS — N32.89 IRRITABLE BLADDER: ICD-10-CM

## 2025-04-02 RX ORDER — METOPROLOL SUCCINATE 50 MG/1
50 TABLET, EXTENDED RELEASE ORAL DAILY
Qty: 28 TABLET | Refills: 0 | OUTPATIENT
Start: 2025-04-02 | End: 2025-05-02

## 2025-04-02 RX ORDER — FLUTICASONE PROPIONATE 50 MCG
SPRAY, SUSPENSION (ML) NASAL
Qty: 16 G | Refills: 0 | Status: SHIPPED | OUTPATIENT
Start: 2025-04-02

## 2025-04-02 RX ORDER — FAMOTIDINE 20 MG/1
20 TABLET, FILM COATED ORAL NIGHTLY PRN
Qty: 28 TABLET | Refills: 1 | OUTPATIENT
Start: 2025-04-02

## 2025-04-02 RX ORDER — OXYBUTYNIN CHLORIDE 10 MG/1
10 TABLET, EXTENDED RELEASE ORAL DAILY
Qty: 28 TABLET | Refills: 0 | OUTPATIENT
Start: 2025-04-02 | End: 2025-05-02

## 2025-04-02 RX ORDER — ACETAMINOPHEN 160 MG
2000 TABLET,DISINTEGRATING ORAL DAILY
Qty: 28 CAPSULE | Refills: 5 | OUTPATIENT
Start: 2025-04-02

## 2025-04-15 ENCOUNTER — OFFICE VISIT (OUTPATIENT)
Dept: FAMILY MEDICINE CLINIC | Age: 70
End: 2025-04-15
Payer: MEDICARE

## 2025-04-15 VITALS
WEIGHT: 211.6 LBS | OXYGEN SATURATION: 98 % | DIASTOLIC BLOOD PRESSURE: 78 MMHG | HEIGHT: 64 IN | HEART RATE: 64 BPM | BODY MASS INDEX: 36.12 KG/M2 | SYSTOLIC BLOOD PRESSURE: 126 MMHG

## 2025-04-15 DIAGNOSIS — Z23 NEED FOR PNEUMOCOCCAL 20-VALENT CONJUGATE VACCINATION: ICD-10-CM

## 2025-04-15 DIAGNOSIS — E66.9 OBESITY (BMI 30-39.9): ICD-10-CM

## 2025-04-15 DIAGNOSIS — E55.9 VITAMIN D DEFICIENCY DISEASE: ICD-10-CM

## 2025-04-15 DIAGNOSIS — Z12.31 SCREENING MAMMOGRAM FOR BREAST CANCER: ICD-10-CM

## 2025-04-15 DIAGNOSIS — I10 ESSENTIAL HYPERTENSION: ICD-10-CM

## 2025-04-15 DIAGNOSIS — F39 MOOD DISORDER: ICD-10-CM

## 2025-04-15 DIAGNOSIS — E66.812 CLASS 2 OBESITY WITH BODY MASS INDEX (BMI) OF 36.0 TO 36.9 IN ADULT, UNSPECIFIED OBESITY TYPE, UNSPECIFIED WHETHER SERIOUS COMORBIDITY PRESENT: ICD-10-CM

## 2025-04-15 DIAGNOSIS — E53.9 VITAMIN B DEFICIENCY: ICD-10-CM

## 2025-04-15 DIAGNOSIS — Z00.00 MEDICARE ANNUAL WELLNESS VISIT, SUBSEQUENT: ICD-10-CM

## 2025-04-15 DIAGNOSIS — Z59.82 TRANSPORTATION INSECURITY: Primary | ICD-10-CM

## 2025-04-15 DIAGNOSIS — E11.69 TYPE 2 DIABETES MELLITUS WITH OTHER SPECIFIED COMPLICATION, WITHOUT LONG-TERM CURRENT USE OF INSULIN: ICD-10-CM

## 2025-04-15 DIAGNOSIS — N32.89 IRRITABLE BLADDER: ICD-10-CM

## 2025-04-15 DIAGNOSIS — E78.5 DYSLIPIDEMIA: ICD-10-CM

## 2025-04-15 DIAGNOSIS — E05.00 GOITER, TOXIC DIFFUSE: ICD-10-CM

## 2025-04-15 PROBLEM — H90.3 SENSORINEURAL HEARING LOSS, BILATERAL: Status: ACTIVE | Noted: 2023-04-24

## 2025-04-15 PROCEDURE — 1036F TOBACCO NON-USER: CPT | Performed by: FAMILY MEDICINE

## 2025-04-15 PROCEDURE — G0439 PPPS, SUBSEQ VISIT: HCPCS | Performed by: FAMILY MEDICINE

## 2025-04-15 PROCEDURE — 90677 PCV20 VACCINE IM: CPT | Performed by: FAMILY MEDICINE

## 2025-04-15 PROCEDURE — G8417 CALC BMI ABV UP PARAM F/U: HCPCS | Performed by: FAMILY MEDICINE

## 2025-04-15 PROCEDURE — 3017F COLORECTAL CA SCREEN DOC REV: CPT | Performed by: FAMILY MEDICINE

## 2025-04-15 PROCEDURE — 3074F SYST BP LT 130 MM HG: CPT | Performed by: FAMILY MEDICINE

## 2025-04-15 PROCEDURE — G8399 PT W/DXA RESULTS DOCUMENT: HCPCS | Performed by: FAMILY MEDICINE

## 2025-04-15 PROCEDURE — 1160F RVW MEDS BY RX/DR IN RCRD: CPT | Performed by: FAMILY MEDICINE

## 2025-04-15 PROCEDURE — G8427 DOCREV CUR MEDS BY ELIG CLIN: HCPCS | Performed by: FAMILY MEDICINE

## 2025-04-15 PROCEDURE — 99214 OFFICE O/P EST MOD 30 MIN: CPT | Performed by: FAMILY MEDICINE

## 2025-04-15 PROCEDURE — G0009 ADMIN PNEUMOCOCCAL VACCINE: HCPCS | Performed by: FAMILY MEDICINE

## 2025-04-15 PROCEDURE — 1123F ACP DISCUSS/DSCN MKR DOCD: CPT | Performed by: FAMILY MEDICINE

## 2025-04-15 PROCEDURE — 2022F DILAT RTA XM EVC RTNOPTHY: CPT | Performed by: FAMILY MEDICINE

## 2025-04-15 PROCEDURE — 3046F HEMOGLOBIN A1C LEVEL >9.0%: CPT | Performed by: FAMILY MEDICINE

## 2025-04-15 PROCEDURE — 3078F DIAST BP <80 MM HG: CPT | Performed by: FAMILY MEDICINE

## 2025-04-15 PROCEDURE — 1090F PRES/ABSN URINE INCON ASSESS: CPT | Performed by: FAMILY MEDICINE

## 2025-04-15 PROCEDURE — 1159F MED LIST DOCD IN RCRD: CPT | Performed by: FAMILY MEDICINE

## 2025-04-15 RX ORDER — METOPROLOL SUCCINATE 50 MG/1
50 TABLET, EXTENDED RELEASE ORAL DAILY
Qty: 90 TABLET | Refills: 1 | Status: SHIPPED | OUTPATIENT
Start: 2025-04-15

## 2025-04-15 RX ORDER — ACETAMINOPHEN 160 MG
2000 TABLET,DISINTEGRATING ORAL DAILY
Qty: 90 CAPSULE | Refills: 2 | Status: SHIPPED | OUTPATIENT
Start: 2025-04-15

## 2025-04-15 SDOH — ECONOMIC STABILITY: FOOD INSECURITY: WITHIN THE PAST 12 MONTHS, THE FOOD YOU BOUGHT JUST DIDN'T LAST AND YOU DIDN'T HAVE MONEY TO GET MORE.: OFTEN TRUE

## 2025-04-15 SDOH — ECONOMIC STABILITY: FOOD INSECURITY: WITHIN THE PAST 12 MONTHS, YOU WORRIED THAT YOUR FOOD WOULD RUN OUT BEFORE YOU GOT MONEY TO BUY MORE.: SOMETIMES TRUE

## 2025-04-15 SDOH — ECONOMIC STABILITY - TRANSPORTATION SECURITY: TRANSPORTATION INSECURITY: Z59.82

## 2025-04-15 ASSESSMENT — ANXIETY QUESTIONNAIRES
1. FEELING NERVOUS, ANXIOUS, OR ON EDGE: SEVERAL DAYS
7. FEELING AFRAID AS IF SOMETHING AWFUL MIGHT HAPPEN: NOT AT ALL
5. BEING SO RESTLESS THAT IT IS HARD TO SIT STILL: NOT AT ALL
GAD7 TOTAL SCORE: 1
IF YOU CHECKED OFF ANY PROBLEMS ON THIS QUESTIONNAIRE, HOW DIFFICULT HAVE THESE PROBLEMS MADE IT FOR YOU TO DO YOUR WORK, TAKE CARE OF THINGS AT HOME, OR GET ALONG WITH OTHER PEOPLE: SOMEWHAT DIFFICULT
2. NOT BEING ABLE TO STOP OR CONTROL WORRYING: NOT AT ALL
6. BECOMING EASILY ANNOYED OR IRRITABLE: NOT AT ALL
4. TROUBLE RELAXING: NOT AT ALL
3. WORRYING TOO MUCH ABOUT DIFFERENT THINGS: NOT AT ALL

## 2025-04-15 ASSESSMENT — PATIENT HEALTH QUESTIONNAIRE - PHQ9
SUM OF ALL RESPONSES TO PHQ QUESTIONS 1-9: 1
SUM OF ALL RESPONSES TO PHQ QUESTIONS 1-9: 1
1. LITTLE INTEREST OR PLEASURE IN DOING THINGS: SEVERAL DAYS
2. FEELING DOWN, DEPRESSED OR HOPELESS: NOT AT ALL
SUM OF ALL RESPONSES TO PHQ QUESTIONS 1-9: 1
SUM OF ALL RESPONSES TO PHQ QUESTIONS 1-9: 1

## 2025-04-15 ASSESSMENT — LIFESTYLE VARIABLES
HOW OFTEN DO YOU HAVE A DRINK CONTAINING ALCOHOL: NEVER
HOW MANY STANDARD DRINKS CONTAINING ALCOHOL DO YOU HAVE ON A TYPICAL DAY: PATIENT DOES NOT DRINK

## 2025-04-15 NOTE — PROGRESS NOTES
Medicare Annual Wellness Visit    Kaye Danielle is here for Medicare AWV    Assessment & Plan   Transportation insecurity  -     [facility] Referral for Social Determinants of Health (Primary Care Practices)  Screening mammogram for breast cancer  Vitamin D deficiency disease  Obesity (BMI 30-39.9)  Dyslipidemia  Essential hypertension  Type 2 diabetes mellitus with other specified complication, without long-term current use of insulin  Class 2 obesity with body mass index (BMI) of 36.0 to 36.9 in adult, unspecified obesity type, unspecified whether serious comorbidity present  Goiter, toxic diffuse  Irritable bladder  Mood disorder  Vitamin B deficiency  Medicare annual wellness visit, subsequent       No follow-ups on file.     Subjective   The following acute and/or chronic problems were also addressed today:  Obesity, Diabetes, HTN, Dyslipidemia  States had to go to ER due to vertigo- lasted a couple of days has  work up scheduled in July  Could not get in before   Sees Dr Avendano- for MNG   States out of meds but has not called her office yet for refills- states labs recently were completed- not sure if she was on med when she did labs  States one of her hearing aids not working - sees audiologist in may  Patient's complete Health Risk Assessment and screening values have been reviewed and are found in Flowsheets. The following problems were reviewed today and where indicated follow up appointments were made and/or referrals ordered.    Positive Risk Factor Screenings with Interventions:     Cognitive:   Clock Drawing Test (CDT): (!) Abnormal  Words recalled: 0 Words Recalled  Total Score: (!) 0  Total Score Interpretation: Abnormal Mini-Cog  Interventions:  Patient declines any further evaluation or treatment           General HRA Questions:  Select all that apply: (!) New or Increased Fatigue, Stress  Interventions Fatigue:  Patient declined any further interventions or treatment  Interventions - Stress:  Patient

## 2025-04-15 NOTE — PATIENT INSTRUCTIONS
adapted under license by [employer]. If you have questions about a medical condition or this instruction, always ask your healthcare professional. [employer] disclaims any warranty or liability for your use of this information.    Personalized Preventive Plan for Kaye Danielle - 4/15/2025  Medicare offers a range of preventive health benefits. Some of the tests and screenings are paid in full while other may be subject to a deductible, co-insurance, and/or copay.  Some of these benefits include a comprehensive review of your medical history including lifestyle, illnesses that may run in your family, and various assessments and screenings as appropriate.  After reviewing your medical record and screening and assessments performed today your provider may have ordered immunizations, labs, imaging, and/or referrals for you.  A list of these orders (if applicable) as well as your Preventive Care list are included within your After Visit Summary for your review.

## 2025-04-17 ENCOUNTER — TELEPHONE (OUTPATIENT)
Dept: FAMILY MEDICINE CLINIC | Age: 70
End: 2025-04-17

## 2025-04-28 ENCOUNTER — HOSPITAL ENCOUNTER (OUTPATIENT)
Dept: MAMMOGRAPHY | Age: 70
Discharge: HOME | End: 2025-04-30
Attending: FAMILY MEDICINE
Payer: MEDICARE

## 2025-04-28 VITALS — WEIGHT: 211 LBS | BODY MASS INDEX: 36.02 KG/M2 | HEIGHT: 64 IN

## 2025-04-28 DIAGNOSIS — Z12.31 SCREENING MAMMOGRAM FOR BREAST CANCER: ICD-10-CM

## 2025-04-28 PROCEDURE — 77063 BREAST TOMOSYNTHESIS BI: CPT

## 2025-05-12 LAB — HBA1C MFR BLD: 5.6 %

## 2025-05-23 ENCOUNTER — TELEPHONE (OUTPATIENT)
Dept: FAMILY MEDICINE CLINIC | Age: 70
End: 2025-05-23

## 2025-05-23 NOTE — TELEPHONE ENCOUNTER
Kaye Danielle is calling to request a refill on the following medication(s):    Last Visit Date (If Applicable):  4/15/2025    Next Visit Date:    7/15/2025    Medication Request:  Requested Prescriptions      No prescriptions requested or ordered in this encounter

## 2025-06-05 ENCOUNTER — OFFICE VISIT (OUTPATIENT)
Dept: FAMILY MEDICINE CLINIC | Age: 70
End: 2025-06-05

## 2025-06-05 ENCOUNTER — TELEPHONE (OUTPATIENT)
Dept: GASTROENTEROLOGY | Age: 70
End: 2025-06-05

## 2025-06-05 VITALS
SYSTOLIC BLOOD PRESSURE: 129 MMHG | DIASTOLIC BLOOD PRESSURE: 85 MMHG | HEIGHT: 64 IN | BODY MASS INDEX: 36.09 KG/M2 | WEIGHT: 211.4 LBS | HEART RATE: 81 BPM

## 2025-06-05 DIAGNOSIS — E66.812 CLASS 2 OBESITY WITH BODY MASS INDEX (BMI) OF 36.0 TO 36.9 IN ADULT, UNSPECIFIED OBESITY TYPE, UNSPECIFIED WHETHER SERIOUS COMORBIDITY PRESENT: ICD-10-CM

## 2025-06-05 DIAGNOSIS — J44.9 CHRONIC OBSTRUCTIVE PULMONARY DISEASE, UNSPECIFIED COPD TYPE (HCC): ICD-10-CM

## 2025-06-05 DIAGNOSIS — E05.00 GOITER, TOXIC DIFFUSE: ICD-10-CM

## 2025-06-05 DIAGNOSIS — K59.00 CONSTIPATION, UNSPECIFIED CONSTIPATION TYPE: ICD-10-CM

## 2025-06-05 DIAGNOSIS — N32.89 IRRITABLE BLADDER: ICD-10-CM

## 2025-06-05 DIAGNOSIS — K21.9 GASTROESOPHAGEAL REFLUX DISEASE, UNSPECIFIED WHETHER ESOPHAGITIS PRESENT: ICD-10-CM

## 2025-06-05 DIAGNOSIS — E11.69 TYPE 2 DIABETES MELLITUS WITH OTHER SPECIFIED COMPLICATION, WITHOUT LONG-TERM CURRENT USE OF INSULIN (HCC): ICD-10-CM

## 2025-06-05 DIAGNOSIS — E78.5 DYSLIPIDEMIA: Primary | ICD-10-CM

## 2025-06-05 DIAGNOSIS — F39 MOOD DISORDER: ICD-10-CM

## 2025-06-05 DIAGNOSIS — I10 ESSENTIAL HYPERTENSION: ICD-10-CM

## 2025-06-05 DIAGNOSIS — R32 URINARY INCONTINENCE, UNSPECIFIED TYPE: ICD-10-CM

## 2025-06-05 RX ORDER — OXYBUTYNIN CHLORIDE 15 MG/1
15 TABLET, EXTENDED RELEASE ORAL DAILY
Qty: 90 TABLET | Refills: 0 | Status: SHIPPED | OUTPATIENT
Start: 2025-06-05

## 2025-06-05 RX ORDER — METFORMIN HYDROCHLORIDE 500 MG/1
500 TABLET, EXTENDED RELEASE ORAL
Qty: 90 TABLET | Refills: 1 | Status: SHIPPED | OUTPATIENT
Start: 2025-06-05

## 2025-06-05 RX ORDER — LOSARTAN POTASSIUM 25 MG/1
25 TABLET ORAL DAILY
Qty: 90 TABLET | Refills: 1 | Status: SHIPPED | OUTPATIENT
Start: 2025-06-05

## 2025-06-05 RX ORDER — METOPROLOL SUCCINATE 50 MG/1
50 TABLET, EXTENDED RELEASE ORAL DAILY
Qty: 90 TABLET | Refills: 1 | Status: SHIPPED | OUTPATIENT
Start: 2025-06-05

## 2025-06-05 RX ORDER — AMLODIPINE BESYLATE 5 MG/1
5 TABLET ORAL DAILY
Qty: 90 TABLET | Refills: 1 | Status: SHIPPED | OUTPATIENT
Start: 2025-06-05

## 2025-06-05 ASSESSMENT — ENCOUNTER SYMPTOMS
SHORTNESS OF BREATH: 0
EYE REDNESS: 0
EYE PAIN: 0
EYE DISCHARGE: 0
DIARRHEA: 0
SORE THROAT: 0
CONSTIPATION: 0
STRIDOR: 0
BACK PAIN: 0
VOMITING: 0
COUGH: 0
BLOOD IN STOOL: 0
PHOTOPHOBIA: 0
NAUSEA: 0
ABDOMINAL PAIN: 0

## 2025-06-05 ASSESSMENT — PATIENT HEALTH QUESTIONNAIRE - PHQ9
SUM OF ALL RESPONSES TO PHQ QUESTIONS 1-9: 1
2. FEELING DOWN, DEPRESSED OR HOPELESS: SEVERAL DAYS
SUM OF ALL RESPONSES TO PHQ QUESTIONS 1-9: 1
1. LITTLE INTEREST OR PLEASURE IN DOING THINGS: NOT AT ALL

## 2025-06-05 NOTE — PROGRESS NOTES
Base) MCG/ACT inhaler Inhale 2 puffs into the lungs every 6 hours as needed for Wheezing 1 Inhaler 3    blood glucose test strips (TRUE METRIX BLOOD GLUCOSE TEST) strip 1 each by In Vitro route 2 times daily As needed. 100 each 5    Alcohol Swabs (ALCOHOL WIPES) 70 % PADS Use up to 4 times a day when testing blood sugars 100 each 5    Lancets MISC 1 each by Does not apply route daily Use 4 times daily Diagnisis:250.0  Diabetes Mellitus 100 each 11    oxyBUTYnin (DITROPAN XL) 15 MG extended release tablet Take 1 tablet by mouth daily 90 tablet 0    [DISCONTINUED] oxyBUTYnin (DITROPAN-XL) 10 MG extended release tablet TAKE 1 TABLET BY MOUTH DAILY 30 tablet 0     No facility-administered encounter medications on file as of 6/5/2025.        Electronically signed by JORDAN FULTON MD on 6/5/25 at 1:46 PM EDT

## 2025-06-05 NOTE — PATIENT INSTRUCTIONS
Thank you for choosing [facility].  We know you have options when it comes to your healthcare; we appreciate that you chose us. Our goal is to provide exceptional  service and world class care to every patient.  You will be receiving a survey via email or text message asking for your feedback.  Please take a few minutes to share your thoughts about your recent visit. Your comments helps us understand what we do well and ways we can improve.  Thank you in advance for your valuable feedback.

## 2025-08-06 ENCOUNTER — OFFICE VISIT (OUTPATIENT)
Dept: GASTROENTEROLOGY | Age: 70
End: 2025-08-06
Payer: MEDICARE

## 2025-08-06 ENCOUNTER — TELEPHONE (OUTPATIENT)
Dept: GASTROENTEROLOGY | Age: 70
End: 2025-08-06

## 2025-08-06 VITALS
SYSTOLIC BLOOD PRESSURE: 126 MMHG | HEART RATE: 64 BPM | OXYGEN SATURATION: 100 % | WEIGHT: 216.8 LBS | TEMPERATURE: 97.7 F | RESPIRATION RATE: 16 BRPM | DIASTOLIC BLOOD PRESSURE: 82 MMHG | HEIGHT: 64 IN | BODY MASS INDEX: 37.01 KG/M2

## 2025-08-06 DIAGNOSIS — K21.9 GASTROESOPHAGEAL REFLUX DISEASE, UNSPECIFIED WHETHER ESOPHAGITIS PRESENT: Primary | ICD-10-CM

## 2025-08-06 PROCEDURE — 1090F PRES/ABSN URINE INCON ASSESS: CPT | Performed by: INTERNAL MEDICINE

## 2025-08-06 PROCEDURE — 3074F SYST BP LT 130 MM HG: CPT | Performed by: INTERNAL MEDICINE

## 2025-08-06 PROCEDURE — 99214 OFFICE O/P EST MOD 30 MIN: CPT | Performed by: INTERNAL MEDICINE

## 2025-08-06 PROCEDURE — G8427 DOCREV CUR MEDS BY ELIG CLIN: HCPCS | Performed by: INTERNAL MEDICINE

## 2025-08-06 PROCEDURE — G8417 CALC BMI ABV UP PARAM F/U: HCPCS | Performed by: INTERNAL MEDICINE

## 2025-08-06 PROCEDURE — 3079F DIAST BP 80-89 MM HG: CPT | Performed by: INTERNAL MEDICINE

## 2025-08-06 RX ORDER — METHIMAZOLE 5 MG/1
5 TABLET ORAL EVERY MORNING
COMMUNITY
Start: 2025-07-21

## 2025-08-06 RX ORDER — PANTOPRAZOLE SODIUM 40 MG/1
40 TABLET, DELAYED RELEASE ORAL
Qty: 60 TABLET | Refills: 2 | Status: SHIPPED | OUTPATIENT
Start: 2025-08-06

## 2025-08-14 DIAGNOSIS — R32 URINARY INCONTINENCE, UNSPECIFIED TYPE: ICD-10-CM

## 2025-08-14 RX ORDER — OXYBUTYNIN CHLORIDE 15 MG/1
15 TABLET, EXTENDED RELEASE ORAL DAILY
Qty: 90 TABLET | Refills: 0 | Status: SHIPPED | OUTPATIENT
Start: 2025-08-14

## 2025-09-04 ENCOUNTER — OFFICE VISIT (OUTPATIENT)
Dept: FAMILY MEDICINE CLINIC | Age: 70
End: 2025-09-04

## 2025-09-04 VITALS
HEART RATE: 68 BPM | SYSTOLIC BLOOD PRESSURE: 132 MMHG | BODY MASS INDEX: 36.37 KG/M2 | DIASTOLIC BLOOD PRESSURE: 74 MMHG | HEIGHT: 64 IN | OXYGEN SATURATION: 99 % | WEIGHT: 213 LBS

## 2025-09-04 DIAGNOSIS — K21.9 GASTROESOPHAGEAL REFLUX DISEASE, UNSPECIFIED WHETHER ESOPHAGITIS PRESENT: ICD-10-CM

## 2025-09-04 DIAGNOSIS — R42 VERTIGO: ICD-10-CM

## 2025-09-04 DIAGNOSIS — E11.69 TYPE 2 DIABETES MELLITUS WITH OTHER SPECIFIED COMPLICATION, WITHOUT LONG-TERM CURRENT USE OF INSULIN (HCC): ICD-10-CM

## 2025-09-04 DIAGNOSIS — F39 MOOD DISORDER: ICD-10-CM

## 2025-09-04 DIAGNOSIS — J44.9 CHRONIC OBSTRUCTIVE PULMONARY DISEASE, UNSPECIFIED COPD TYPE (HCC): ICD-10-CM

## 2025-09-04 DIAGNOSIS — H91.90 HEARING LOSS, UNSPECIFIED HEARING LOSS TYPE, UNSPECIFIED LATERALITY: ICD-10-CM

## 2025-09-04 DIAGNOSIS — E55.9 VITAMIN D DEFICIENCY DISEASE: ICD-10-CM

## 2025-09-04 DIAGNOSIS — I10 ESSENTIAL HYPERTENSION: Primary | ICD-10-CM

## 2025-09-04 DIAGNOSIS — N32.89 IRRITABLE BLADDER: ICD-10-CM

## 2025-09-04 DIAGNOSIS — E78.5 DYSLIPIDEMIA: ICD-10-CM

## 2025-09-04 DIAGNOSIS — E66.812 CLASS 2 OBESITY WITH BODY MASS INDEX (BMI) OF 36.0 TO 36.9 IN ADULT, UNSPECIFIED OBESITY TYPE, UNSPECIFIED WHETHER SERIOUS COMORBIDITY PRESENT: ICD-10-CM

## 2025-09-04 ASSESSMENT — PATIENT HEALTH QUESTIONNAIRE - PHQ9
SUM OF ALL RESPONSES TO PHQ QUESTIONS 1-9: 16
3. TROUBLE FALLING OR STAYING ASLEEP: MORE THAN HALF THE DAYS
10. IF YOU CHECKED OFF ANY PROBLEMS, HOW DIFFICULT HAVE THESE PROBLEMS MADE IT FOR YOU TO DO YOUR WORK, TAKE CARE OF THINGS AT HOME, OR GET ALONG WITH OTHER PEOPLE: VERY DIFFICULT
SUM OF ALL RESPONSES TO PHQ QUESTIONS 1-9: 16
4. FEELING TIRED OR HAVING LITTLE ENERGY: MORE THAN HALF THE DAYS
5. POOR APPETITE OR OVEREATING: MORE THAN HALF THE DAYS
7. TROUBLE CONCENTRATING ON THINGS, SUCH AS READING THE NEWSPAPER OR WATCHING TELEVISION: MORE THAN HALF THE DAYS
6. FEELING BAD ABOUT YOURSELF - OR THAT YOU ARE A FAILURE OR HAVE LET YOURSELF OR YOUR FAMILY DOWN: MORE THAN HALF THE DAYS
2. FEELING DOWN, DEPRESSED OR HOPELESS: SEVERAL DAYS
9. THOUGHTS THAT YOU WOULD BE BETTER OFF DEAD, OR OF HURTING YOURSELF: MORE THAN HALF THE DAYS
SUM OF ALL RESPONSES TO PHQ QUESTIONS 1-9: 16
8. MOVING OR SPEAKING SO SLOWLY THAT OTHER PEOPLE COULD HAVE NOTICED. OR THE OPPOSITE, BEING SO FIGETY OR RESTLESS THAT YOU HAVE BEEN MOVING AROUND A LOT MORE THAN USUAL: MORE THAN HALF THE DAYS
1. LITTLE INTEREST OR PLEASURE IN DOING THINGS: SEVERAL DAYS
SUM OF ALL RESPONSES TO PHQ QUESTIONS 1-9: 14

## 2025-09-04 ASSESSMENT — ENCOUNTER SYMPTOMS
VOMITING: 0
BLOOD IN STOOL: 0
SHORTNESS OF BREATH: 0
BACK PAIN: 0
ABDOMINAL PAIN: 0
STRIDOR: 0
EYE PAIN: 0
NAUSEA: 0
DIARRHEA: 0
SORE THROAT: 0
EYE REDNESS: 0
CONSTIPATION: 0
EYE DISCHARGE: 0
PHOTOPHOBIA: 0

## 2025-09-04 ASSESSMENT — COLUMBIA-SUICIDE SEVERITY RATING SCALE - C-SSRS
2. HAVE YOU ACTUALLY HAD ANY THOUGHTS OF KILLING YOURSELF?: NO
6. HAVE YOU EVER DONE ANYTHING, STARTED TO DO ANYTHING, OR PREPARED TO DO ANYTHING TO END YOUR LIFE?: NO
1. WITHIN THE PAST MONTH, HAVE YOU WISHED YOU WERE DEAD OR WISHED YOU COULD GO TO SLEEP AND NOT WAKE UP?: NO

## (undated) DEVICE — ENDO KIT W/SYRINGE: Brand: MEDLINE INDUSTRIES, INC.

## (undated) DEVICE — DEFENDO AIR WATER SUCTION AND BIOPSY VALVE KIT FOR  OLYMPUS: Brand: DEFENDO AIR/WATER/SUCTION AND BIOPSY VALVE

## (undated) DEVICE — FORCEPS BX L240CM WRK CHN 2.8MM STD CAP W/ NDL MIC MESH

## (undated) DEVICE — BITEBLOCK 54FR W/ DENT RIM BLOX